# Patient Record
Sex: FEMALE | Race: WHITE | Employment: OTHER | URBAN - METROPOLITAN AREA
[De-identification: names, ages, dates, MRNs, and addresses within clinical notes are randomized per-mention and may not be internally consistent; named-entity substitution may affect disease eponyms.]

---

## 2017-01-09 ENCOUNTER — OFFICE VISIT (OUTPATIENT)
Dept: GERIATRIC MEDICINE | Age: 82
End: 2017-01-09

## 2017-01-09 DIAGNOSIS — D72.829 LEUKOCYTOSIS, UNSPECIFIED TYPE: ICD-10-CM

## 2017-01-09 DIAGNOSIS — E11.9 DIABETES MELLITUS, TYPE II, INSULIN DEPENDENT (HCC): ICD-10-CM

## 2017-01-09 DIAGNOSIS — G62.9 NEUROPATHY: ICD-10-CM

## 2017-01-09 DIAGNOSIS — Z79.4 DIABETES MELLITUS, TYPE II, INSULIN DEPENDENT (HCC): ICD-10-CM

## 2017-01-09 DIAGNOSIS — Z74.09 IMPAIRED MOBILITY: ICD-10-CM

## 2017-01-09 DIAGNOSIS — E03.9 HYPOTHYROIDISM, UNSPECIFIED TYPE: ICD-10-CM

## 2017-01-09 DIAGNOSIS — I10 ESSENTIAL HYPERTENSION: Primary | ICD-10-CM

## 2017-01-09 PROCEDURE — 99309 SBSQ NF CARE MODERATE MDM 30: CPT | Performed by: NURSE PRACTITIONER

## 2017-01-10 VITALS
TEMPERATURE: 97.3 F | HEART RATE: 57 BPM | RESPIRATION RATE: 16 BRPM | DIASTOLIC BLOOD PRESSURE: 57 MMHG | BODY MASS INDEX: 23.1 KG/M2 | WEIGHT: 147.5 LBS | SYSTOLIC BLOOD PRESSURE: 134 MMHG

## 2017-01-10 PROBLEM — Z74.09 IMPAIRED MOBILITY: Status: ACTIVE | Noted: 2017-01-10

## 2017-01-10 ASSESSMENT — ENCOUNTER SYMPTOMS
SHORTNESS OF BREATH: 0
ABDOMINAL PAIN: 0
ABDOMINAL DISTENTION: 0
NAUSEA: 0
CONSTIPATION: 0
DIARRHEA: 0
COUGH: 0
BLOOD IN STOOL: 0
VOMITING: 0

## 2017-01-23 ENCOUNTER — OFFICE VISIT (OUTPATIENT)
Dept: GERIATRIC MEDICINE | Age: 82
End: 2017-01-23

## 2017-01-23 DIAGNOSIS — D72.829 LEUKOCYTOSIS, UNSPECIFIED TYPE: ICD-10-CM

## 2017-01-23 DIAGNOSIS — R09.81 NASAL CONGESTION: ICD-10-CM

## 2017-01-23 DIAGNOSIS — R05.9 COUGH: Primary | ICD-10-CM

## 2017-01-23 PROCEDURE — 1123F ACP DISCUSS/DSCN MKR DOCD: CPT | Performed by: NURSE PRACTITIONER

## 2017-01-23 PROCEDURE — 99308 SBSQ NF CARE LOW MDM 20: CPT | Performed by: NURSE PRACTITIONER

## 2017-01-24 VITALS
SYSTOLIC BLOOD PRESSURE: 130 MMHG | TEMPERATURE: 98 F | RESPIRATION RATE: 20 BRPM | HEART RATE: 58 BPM | DIASTOLIC BLOOD PRESSURE: 57 MMHG | OXYGEN SATURATION: 95 %

## 2017-01-30 ASSESSMENT — ENCOUNTER SYMPTOMS
DIARRHEA: 0
WHEEZING: 0
VOICE CHANGE: 1
SHORTNESS OF BREATH: 0
ABDOMINAL PAIN: 0
SINUS PRESSURE: 0
SORE THROAT: 0
COUGH: 1
VOMITING: 0
ABDOMINAL DISTENTION: 0
RHINORRHEA: 0
NAUSEA: 0

## 2017-02-17 ENCOUNTER — NURSE ONLY (OUTPATIENT)
Dept: GERIATRIC MEDICINE | Age: 82
End: 2017-02-17

## 2017-02-17 DIAGNOSIS — L30.9 ECZEMA, UNSPECIFIED TYPE: Primary | ICD-10-CM

## 2017-02-17 PROCEDURE — 99308 SBSQ NF CARE LOW MDM 20: CPT | Performed by: NURSE PRACTITIONER

## 2017-02-20 VITALS
TEMPERATURE: 97.5 F | HEART RATE: 53 BPM | DIASTOLIC BLOOD PRESSURE: 65 MMHG | SYSTOLIC BLOOD PRESSURE: 131 MMHG | RESPIRATION RATE: 16 BRPM

## 2017-02-20 ASSESSMENT — ENCOUNTER SYMPTOMS
SHORTNESS OF BREATH: 0
COUGH: 0

## 2017-02-21 ENCOUNTER — OFFICE VISIT (OUTPATIENT)
Dept: GERIATRIC MEDICINE | Age: 82
End: 2017-02-21

## 2017-02-21 DIAGNOSIS — E11.8 CONTROLLED DIABETES MELLITUS TYPE 2 WITH COMPLICATIONS, UNSPECIFIED LONG TERM INSULIN USE STATUS: ICD-10-CM

## 2017-02-21 DIAGNOSIS — I10 ESSENTIAL HYPERTENSION: ICD-10-CM

## 2017-02-21 DIAGNOSIS — M79.606 LEG PAIN, ANTERIOR, UNSPECIFIED LATERALITY: Primary | ICD-10-CM

## 2017-02-21 PROCEDURE — 99308 SBSQ NF CARE LOW MDM 20: CPT | Performed by: INTERNAL MEDICINE

## 2017-02-21 PROCEDURE — 1123F ACP DISCUSS/DSCN MKR DOCD: CPT | Performed by: INTERNAL MEDICINE

## 2017-03-23 ENCOUNTER — NURSE ONLY (OUTPATIENT)
Dept: GERIATRIC MEDICINE | Age: 82
End: 2017-03-23

## 2017-03-23 DIAGNOSIS — E11.9 DIABETES MELLITUS, TYPE II, INSULIN DEPENDENT (HCC): Primary | ICD-10-CM

## 2017-03-23 DIAGNOSIS — I10 ESSENTIAL HYPERTENSION: ICD-10-CM

## 2017-03-23 DIAGNOSIS — N18.9 CKD (CHRONIC KIDNEY DISEASE), UNSPECIFIED STAGE: ICD-10-CM

## 2017-03-23 DIAGNOSIS — D72.829 LEUKOCYTOSIS, UNSPECIFIED TYPE: ICD-10-CM

## 2017-03-23 DIAGNOSIS — Z79.4 DIABETES MELLITUS, TYPE II, INSULIN DEPENDENT (HCC): Primary | ICD-10-CM

## 2017-03-23 DIAGNOSIS — Z74.09 IMPAIRED MOBILITY: ICD-10-CM

## 2017-03-23 PROCEDURE — 99309 SBSQ NF CARE MODERATE MDM 30: CPT | Performed by: NURSE PRACTITIONER

## 2017-03-27 ASSESSMENT — ENCOUNTER SYMPTOMS
VOMITING: 0
NAUSEA: 0
DIARRHEA: 0
SHORTNESS OF BREATH: 0
CONSTIPATION: 0
ABDOMINAL DISTENTION: 0
ABDOMINAL PAIN: 0
BLOOD IN STOOL: 0
COUGH: 0

## 2017-03-28 VITALS
HEART RATE: 56 BPM | RESPIRATION RATE: 14 BRPM | TEMPERATURE: 97.7 F | DIASTOLIC BLOOD PRESSURE: 95 MMHG | SYSTOLIC BLOOD PRESSURE: 136 MMHG

## 2017-03-30 ENCOUNTER — OFFICE VISIT (OUTPATIENT)
Dept: GERIATRIC MEDICINE | Age: 82
End: 2017-03-30

## 2017-03-30 DIAGNOSIS — D72.829 LEUKOCYTOSIS, UNSPECIFIED TYPE: Primary | ICD-10-CM

## 2017-03-30 PROCEDURE — 1123F ACP DISCUSS/DSCN MKR DOCD: CPT | Performed by: NURSE PRACTITIONER

## 2017-03-30 PROCEDURE — 99308 SBSQ NF CARE LOW MDM 20: CPT | Performed by: NURSE PRACTITIONER

## 2017-03-31 VITALS
SYSTOLIC BLOOD PRESSURE: 121 MMHG | RESPIRATION RATE: 20 BRPM | HEART RATE: 58 BPM | TEMPERATURE: 98 F | DIASTOLIC BLOOD PRESSURE: 45 MMHG | OXYGEN SATURATION: 95 %

## 2017-04-03 ASSESSMENT — ENCOUNTER SYMPTOMS
SINUS PRESSURE: 0
DIARRHEA: 0
ABDOMINAL PAIN: 0
SORE THROAT: 0
CONSTIPATION: 0
ABDOMINAL DISTENTION: 0
SHORTNESS OF BREATH: 0
VOMITING: 0
RHINORRHEA: 0
NAUSEA: 0
BLOOD IN STOOL: 0
COUGH: 0

## 2017-04-18 ENCOUNTER — NURSE ONLY (OUTPATIENT)
Dept: GERIATRIC MEDICINE | Age: 82
End: 2017-04-18

## 2017-04-18 DIAGNOSIS — G62.9 NEUROPATHY: ICD-10-CM

## 2017-04-18 DIAGNOSIS — I10 ESSENTIAL HYPERTENSION: Primary | ICD-10-CM

## 2017-04-18 DIAGNOSIS — M15.9 OSTEOARTHRITIS OF MULTIPLE JOINTS, UNSPECIFIED OSTEOARTHRITIS TYPE: ICD-10-CM

## 2017-04-18 PROCEDURE — 99308 SBSQ NF CARE LOW MDM 20: CPT | Performed by: INTERNAL MEDICINE

## 2017-05-11 ENCOUNTER — OFFICE VISIT (OUTPATIENT)
Dept: GERIATRIC MEDICINE | Age: 82
End: 2017-05-11

## 2017-05-11 DIAGNOSIS — Z79.4 DIABETES MELLITUS, TYPE II, INSULIN DEPENDENT (HCC): ICD-10-CM

## 2017-05-11 DIAGNOSIS — N18.9 CKD (CHRONIC KIDNEY DISEASE), UNSPECIFIED STAGE: ICD-10-CM

## 2017-05-11 DIAGNOSIS — I10 ESSENTIAL HYPERTENSION: Primary | ICD-10-CM

## 2017-05-11 DIAGNOSIS — E11.9 DIABETES MELLITUS, TYPE II, INSULIN DEPENDENT (HCC): ICD-10-CM

## 2017-05-11 DIAGNOSIS — Z78.9 IMPAIRED MOBILITY AND ADLS: ICD-10-CM

## 2017-05-11 DIAGNOSIS — E03.9 HYPOTHYROIDISM, UNSPECIFIED TYPE: ICD-10-CM

## 2017-05-11 DIAGNOSIS — D72.829 LEUKOCYTOSIS, UNSPECIFIED TYPE: ICD-10-CM

## 2017-05-11 DIAGNOSIS — Z74.09 IMPAIRED MOBILITY AND ADLS: ICD-10-CM

## 2017-05-11 PROCEDURE — 99309 SBSQ NF CARE MODERATE MDM 30: CPT | Performed by: NURSE PRACTITIONER

## 2017-05-11 PROCEDURE — 1123F ACP DISCUSS/DSCN MKR DOCD: CPT | Performed by: NURSE PRACTITIONER

## 2017-05-17 VITALS
SYSTOLIC BLOOD PRESSURE: 132 MMHG | BODY MASS INDEX: 23.65 KG/M2 | WEIGHT: 151 LBS | DIASTOLIC BLOOD PRESSURE: 68 MMHG | RESPIRATION RATE: 18 BRPM | HEART RATE: 70 BPM

## 2017-05-17 PROBLEM — D72.829 LEUKOCYTOSIS: Status: ACTIVE | Noted: 2017-05-17

## 2017-05-17 ASSESSMENT — ENCOUNTER SYMPTOMS
CONSTIPATION: 0
NAUSEA: 0
BLOOD IN STOOL: 0
COUGH: 0
VOMITING: 0
ABDOMINAL DISTENTION: 0
DIARRHEA: 0
ABDOMINAL PAIN: 0
SHORTNESS OF BREATH: 0

## 2017-06-06 ENCOUNTER — OFFICE VISIT (OUTPATIENT)
Dept: GERIATRIC MEDICINE | Age: 82
End: 2017-06-06

## 2017-06-06 DIAGNOSIS — E11.9 DIABETES MELLITUS, TYPE II, INSULIN DEPENDENT (HCC): Primary | ICD-10-CM

## 2017-06-06 DIAGNOSIS — N18.9 CKD (CHRONIC KIDNEY DISEASE), UNSPECIFIED STAGE: ICD-10-CM

## 2017-06-06 DIAGNOSIS — Z79.4 DIABETES MELLITUS, TYPE II, INSULIN DEPENDENT (HCC): Primary | ICD-10-CM

## 2017-06-06 PROCEDURE — 99308 SBSQ NF CARE LOW MDM 20: CPT | Performed by: NURSE PRACTITIONER

## 2017-06-06 PROCEDURE — 1123F ACP DISCUSS/DSCN MKR DOCD: CPT | Performed by: NURSE PRACTITIONER

## 2017-06-07 VITALS
WEIGHT: 150 LBS | HEART RATE: 57 BPM | TEMPERATURE: 97.8 F | RESPIRATION RATE: 16 BRPM | OXYGEN SATURATION: 94 % | SYSTOLIC BLOOD PRESSURE: 162 MMHG | DIASTOLIC BLOOD PRESSURE: 80 MMHG | BODY MASS INDEX: 23.49 KG/M2

## 2017-06-07 RX ORDER — INSULIN GLARGINE 100 [IU]/ML
50 INJECTION, SOLUTION SUBCUTANEOUS DAILY
Qty: 1 VIAL | Refills: 0
Start: 2017-06-07 | End: 2017-06-15 | Stop reason: SDUPTHER

## 2017-06-08 ENCOUNTER — OFFICE VISIT (OUTPATIENT)
Dept: GERIATRIC MEDICINE | Age: 82
End: 2017-06-08

## 2017-06-08 VITALS
HEART RATE: 57 BPM | TEMPERATURE: 97.8 F | SYSTOLIC BLOOD PRESSURE: 162 MMHG | RESPIRATION RATE: 16 BRPM | DIASTOLIC BLOOD PRESSURE: 80 MMHG

## 2017-06-08 PROCEDURE — 1123F ACP DISCUSS/DSCN MKR DOCD: CPT | Performed by: NURSE PRACTITIONER

## 2017-06-08 PROCEDURE — 99308 SBSQ NF CARE LOW MDM 20: CPT | Performed by: NURSE PRACTITIONER

## 2017-06-15 ENCOUNTER — OFFICE VISIT (OUTPATIENT)
Dept: GERIATRIC MEDICINE | Age: 82
End: 2017-06-15

## 2017-06-15 VITALS — SYSTOLIC BLOOD PRESSURE: 140 MMHG | HEART RATE: 60 BPM | DIASTOLIC BLOOD PRESSURE: 89 MMHG | RESPIRATION RATE: 18 BRPM

## 2017-06-15 DIAGNOSIS — Z79.4 DIABETES MELLITUS, TYPE II, INSULIN DEPENDENT (HCC): Primary | ICD-10-CM

## 2017-06-15 DIAGNOSIS — E11.9 DIABETES MELLITUS, TYPE II, INSULIN DEPENDENT (HCC): Primary | ICD-10-CM

## 2017-06-15 PROCEDURE — 1036F TOBACCO NON-USER: CPT | Performed by: NURSE PRACTITIONER

## 2017-06-15 PROCEDURE — G8599 NO ASA/ANTIPLAT THER USE RNG: HCPCS | Performed by: NURSE PRACTITIONER

## 2017-06-15 PROCEDURE — 1090F PRES/ABSN URINE INCON ASSESS: CPT | Performed by: NURSE PRACTITIONER

## 2017-06-15 PROCEDURE — 4040F PNEUMOC VAC/ADMIN/RCVD: CPT | Performed by: NURSE PRACTITIONER

## 2017-06-15 PROCEDURE — G8427 DOCREV CUR MEDS BY ELIG CLIN: HCPCS | Performed by: NURSE PRACTITIONER

## 2017-06-15 PROCEDURE — G8420 CALC BMI NORM PARAMETERS: HCPCS | Performed by: NURSE PRACTITIONER

## 2017-06-15 PROCEDURE — 1123F ACP DISCUSS/DSCN MKR DOCD: CPT | Performed by: NURSE PRACTITIONER

## 2017-06-15 PROCEDURE — 99307 SBSQ NF CARE SF MDM 10: CPT | Performed by: NURSE PRACTITIONER

## 2017-06-15 RX ORDER — INSULIN GLARGINE 100 [IU]/ML
45 INJECTION, SOLUTION SUBCUTANEOUS NIGHTLY
Qty: 1 VIAL | Refills: 0
Start: 2017-06-15 | End: 2017-07-16 | Stop reason: SDUPTHER

## 2017-06-20 ENCOUNTER — OFFICE VISIT (OUTPATIENT)
Dept: GERIATRIC MEDICINE | Age: 82
End: 2017-06-20

## 2017-06-20 DIAGNOSIS — Z79.4 DIABETES MELLITUS, TYPE II, INSULIN DEPENDENT (HCC): Primary | ICD-10-CM

## 2017-06-20 DIAGNOSIS — E11.9 DIABETES MELLITUS, TYPE II, INSULIN DEPENDENT (HCC): Primary | ICD-10-CM

## 2017-06-20 DIAGNOSIS — L57.0 ACTINIC KERATOSES: ICD-10-CM

## 2017-06-20 PROCEDURE — 99308 SBSQ NF CARE LOW MDM 20: CPT | Performed by: NURSE PRACTITIONER

## 2017-06-20 PROCEDURE — 1123F ACP DISCUSS/DSCN MKR DOCD: CPT | Performed by: NURSE PRACTITIONER

## 2017-06-21 VITALS — DIASTOLIC BLOOD PRESSURE: 80 MMHG | TEMPERATURE: 97.8 F | SYSTOLIC BLOOD PRESSURE: 162 MMHG | HEART RATE: 57 BPM

## 2017-06-21 RX ORDER — FLUOROURACIL 5 MG/G
CREAM TOPICAL
Qty: 1 TUBE | Refills: 0
Start: 2017-06-21 | End: 2017-07-18 | Stop reason: SDUPTHER

## 2017-07-13 ENCOUNTER — OFFICE VISIT (OUTPATIENT)
Dept: GERIATRIC MEDICINE | Age: 82
End: 2017-07-13

## 2017-07-13 DIAGNOSIS — E11.9 DIABETES MELLITUS, TYPE II, INSULIN DEPENDENT (HCC): ICD-10-CM

## 2017-07-13 DIAGNOSIS — Z79.4 DIABETES MELLITUS, TYPE II, INSULIN DEPENDENT (HCC): ICD-10-CM

## 2017-07-13 LAB — HBA1C MFR BLD: 6.8 %

## 2017-07-13 PROCEDURE — 99308 SBSQ NF CARE LOW MDM 20: CPT | Performed by: NURSE PRACTITIONER

## 2017-07-13 PROCEDURE — 1123F ACP DISCUSS/DSCN MKR DOCD: CPT | Performed by: NURSE PRACTITIONER

## 2017-07-16 VITALS
SYSTOLIC BLOOD PRESSURE: 162 MMHG | OXYGEN SATURATION: 94 % | TEMPERATURE: 97.8 F | DIASTOLIC BLOOD PRESSURE: 80 MMHG | WEIGHT: 150 LBS | BODY MASS INDEX: 23.49 KG/M2 | HEART RATE: 57 BPM | RESPIRATION RATE: 16 BRPM

## 2017-07-16 RX ORDER — INSULIN GLARGINE 100 [IU]/ML
35 INJECTION, SOLUTION SUBCUTANEOUS NIGHTLY
Qty: 1 VIAL | Refills: 0
Start: 2017-07-16 | End: 2017-07-18 | Stop reason: SDUPTHER

## 2017-07-18 ENCOUNTER — OFFICE VISIT (OUTPATIENT)
Dept: GERIATRIC MEDICINE | Age: 82
End: 2017-07-18

## 2017-07-18 DIAGNOSIS — Z79.4 DIABETES MELLITUS, TYPE II, INSULIN DEPENDENT (HCC): Primary | ICD-10-CM

## 2017-07-18 DIAGNOSIS — E78.2 MIXED HYPERLIPIDEMIA: ICD-10-CM

## 2017-07-18 DIAGNOSIS — E11.9 DIABETES MELLITUS, TYPE II, INSULIN DEPENDENT (HCC): Primary | ICD-10-CM

## 2017-07-18 DIAGNOSIS — E03.4 HYPOTHYROIDISM DUE TO ACQUIRED ATROPHY OF THYROID: ICD-10-CM

## 2017-07-18 DIAGNOSIS — I10 ESSENTIAL HYPERTENSION: ICD-10-CM

## 2017-07-18 PROCEDURE — 99308 SBSQ NF CARE LOW MDM 20: CPT | Performed by: NURSE PRACTITIONER

## 2017-07-18 PROCEDURE — 1123F ACP DISCUSS/DSCN MKR DOCD: CPT | Performed by: NURSE PRACTITIONER

## 2017-07-18 RX ORDER — PREGABALIN 25 MG/1
25 CAPSULE ORAL 2 TIMES DAILY
COMMUNITY
End: 2017-09-17 | Stop reason: ALTCHOICE

## 2017-07-18 RX ORDER — ACETAMINOPHEN 325 MG/1
650 TABLET ORAL EVERY 4 HOURS PRN
Status: ON HOLD | COMMUNITY
End: 2019-10-22 | Stop reason: HOSPADM

## 2017-07-18 RX ORDER — LISINOPRIL 40 MG/1
40 TABLET ORAL DAILY
COMMUNITY

## 2017-07-18 RX ORDER — OXYCODONE HYDROCHLORIDE AND ACETAMINOPHEN 5; 325 MG/1; MG/1
1-2 TABLET ORAL EVERY 6 HOURS PRN
COMMUNITY
End: 2018-01-08

## 2017-07-18 RX ORDER — OMEPRAZOLE 20 MG/1
20 CAPSULE, DELAYED RELEASE ORAL DAILY
COMMUNITY

## 2017-07-18 RX ORDER — ONDANSETRON 4 MG/1
4 TABLET, FILM COATED ORAL EVERY 8 HOURS PRN
COMMUNITY

## 2017-07-18 RX ORDER — ACETAMINOPHEN 650 MG/1
650 SUPPOSITORY RECTAL EVERY 4 HOURS PRN
Status: ON HOLD | COMMUNITY
End: 2019-10-22 | Stop reason: HOSPADM

## 2017-07-18 RX ORDER — INSULIN GLARGINE 100 [IU]/ML
50 INJECTION, SOLUTION SUBCUTANEOUS NIGHTLY
COMMUNITY
End: 2017-07-20

## 2017-07-18 RX ORDER — LETROZOLE 2.5 MG/1
2.5 TABLET, FILM COATED ORAL DAILY
COMMUNITY

## 2017-07-18 RX ORDER — LEVOTHYROXINE SODIUM 0.12 MG/1
125 TABLET ORAL DAILY
COMMUNITY

## 2017-07-18 RX ORDER — ACETAMINOPHEN 500 MG
500 TABLET ORAL 2 TIMES DAILY
COMMUNITY

## 2017-07-18 RX ORDER — ATORVASTATIN CALCIUM 40 MG/1
40 TABLET, FILM COATED ORAL DAILY
COMMUNITY

## 2017-07-18 RX ORDER — LOPERAMIDE HYDROCHLORIDE 2 MG/1
2 CAPSULE ORAL EVERY 6 HOURS PRN
COMMUNITY

## 2017-07-18 RX ORDER — AMLODIPINE BESYLATE 5 MG/1
5 TABLET ORAL DAILY
COMMUNITY

## 2017-07-18 RX ORDER — MELATONIN
2000 DAILY
COMMUNITY

## 2017-07-18 RX ORDER — POLYVINYL ALCOHOL 14 MG/ML
2 SOLUTION/ DROPS OPHTHALMIC 4 TIMES DAILY PRN
COMMUNITY

## 2017-07-18 RX ORDER — CLINDAMYCIN HYDROCHLORIDE 150 MG/1
600 CAPSULE ORAL PRN
Status: ON HOLD | COMMUNITY
End: 2019-10-22 | Stop reason: HOSPADM

## 2017-07-20 ENCOUNTER — OFFICE VISIT (OUTPATIENT)
Dept: GERIATRIC MEDICINE | Age: 82
End: 2017-07-20

## 2017-07-20 VITALS
WEIGHT: 150 LBS | RESPIRATION RATE: 16 BRPM | DIASTOLIC BLOOD PRESSURE: 80 MMHG | TEMPERATURE: 97.8 F | SYSTOLIC BLOOD PRESSURE: 162 MMHG | HEART RATE: 57 BPM | BODY MASS INDEX: 23.49 KG/M2

## 2017-07-20 DIAGNOSIS — E11.9 DIABETES MELLITUS, TYPE II, INSULIN DEPENDENT (HCC): Primary | ICD-10-CM

## 2017-07-20 DIAGNOSIS — E03.4 HYPOTHYROIDISM DUE TO ACQUIRED ATROPHY OF THYROID: ICD-10-CM

## 2017-07-20 DIAGNOSIS — Z79.4 DIABETES MELLITUS, TYPE II, INSULIN DEPENDENT (HCC): Primary | ICD-10-CM

## 2017-07-20 DIAGNOSIS — I10 ESSENTIAL HYPERTENSION: ICD-10-CM

## 2017-07-20 DIAGNOSIS — Z74.09 IMPAIRED MOBILITY: ICD-10-CM

## 2017-07-20 PROCEDURE — 99308 SBSQ NF CARE LOW MDM 20: CPT | Performed by: NURSE PRACTITIONER

## 2017-07-20 PROCEDURE — 1123F ACP DISCUSS/DSCN MKR DOCD: CPT | Performed by: NURSE PRACTITIONER

## 2017-07-23 VITALS
RESPIRATION RATE: 16 BRPM | DIASTOLIC BLOOD PRESSURE: 80 MMHG | HEART RATE: 57 BPM | TEMPERATURE: 97.8 F | SYSTOLIC BLOOD PRESSURE: 162 MMHG

## 2017-08-30 ENCOUNTER — OFFICE VISIT (OUTPATIENT)
Dept: GERIATRIC MEDICINE | Age: 82
End: 2017-08-30

## 2017-08-30 DIAGNOSIS — Z79.4 TYPE 2 DIABETES MELLITUS WITHOUT COMPLICATION, WITH LONG-TERM CURRENT USE OF INSULIN (HCC): Primary | ICD-10-CM

## 2017-08-30 DIAGNOSIS — M15.9 OSTEOARTHRITIS OF MULTIPLE JOINTS, UNSPECIFIED OSTEOARTHRITIS TYPE: ICD-10-CM

## 2017-08-30 DIAGNOSIS — E11.9 TYPE 2 DIABETES MELLITUS WITHOUT COMPLICATION, WITH LONG-TERM CURRENT USE OF INSULIN (HCC): Primary | ICD-10-CM

## 2017-08-30 DIAGNOSIS — I10 ESSENTIAL HYPERTENSION: ICD-10-CM

## 2017-08-30 PROCEDURE — 99308 SBSQ NF CARE LOW MDM 20: CPT | Performed by: INTERNAL MEDICINE

## 2017-08-30 PROCEDURE — 1123F ACP DISCUSS/DSCN MKR DOCD: CPT | Performed by: INTERNAL MEDICINE

## 2017-09-05 ENCOUNTER — OFFICE VISIT (OUTPATIENT)
Dept: GERIATRIC MEDICINE | Age: 82
End: 2017-09-05

## 2017-09-05 DIAGNOSIS — G54.6 PHANTOM LIMB PAIN (HCC): Primary | ICD-10-CM

## 2017-09-05 DIAGNOSIS — G62.9 NEUROPATHY: ICD-10-CM

## 2017-09-05 PROCEDURE — 99307 SBSQ NF CARE SF MDM 10: CPT | Performed by: NURSE PRACTITIONER

## 2017-09-05 PROCEDURE — 1123F ACP DISCUSS/DSCN MKR DOCD: CPT | Performed by: NURSE PRACTITIONER

## 2017-09-12 ENCOUNTER — OFFICE VISIT (OUTPATIENT)
Dept: GERIATRIC MEDICINE | Age: 82
End: 2017-09-12

## 2017-09-12 DIAGNOSIS — G62.9 NEUROPATHY: Primary | ICD-10-CM

## 2017-09-12 PROCEDURE — 1123F ACP DISCUSS/DSCN MKR DOCD: CPT | Performed by: NURSE PRACTITIONER

## 2017-09-12 PROCEDURE — 99308 SBSQ NF CARE LOW MDM 20: CPT | Performed by: NURSE PRACTITIONER

## 2017-09-13 VITALS
WEIGHT: 150 LBS | TEMPERATURE: 97.8 F | HEART RATE: 57 BPM | OXYGEN SATURATION: 94 % | BODY MASS INDEX: 23.49 KG/M2 | RESPIRATION RATE: 16 BRPM | DIASTOLIC BLOOD PRESSURE: 80 MMHG | SYSTOLIC BLOOD PRESSURE: 162 MMHG

## 2017-09-17 VITALS — RESPIRATION RATE: 18 BRPM | TEMPERATURE: 97.8 F | HEART RATE: 68 BPM

## 2017-09-17 RX ORDER — GABAPENTIN 100 MG/1
100 CAPSULE ORAL DAILY
Qty: 90 CAPSULE | Refills: 3
Start: 2017-09-17

## 2017-09-21 ENCOUNTER — OFFICE VISIT (OUTPATIENT)
Dept: GERIATRIC MEDICINE | Age: 82
End: 2017-09-21

## 2017-09-21 DIAGNOSIS — E03.9 HYPOTHYROIDISM, UNSPECIFIED TYPE: ICD-10-CM

## 2017-09-21 DIAGNOSIS — E11.9 DIABETES MELLITUS, TYPE II, INSULIN DEPENDENT (HCC): Primary | ICD-10-CM

## 2017-09-21 DIAGNOSIS — G54.7 PHANTOM LIMB SYNDROME (HCC): ICD-10-CM

## 2017-09-21 DIAGNOSIS — Z79.4 DIABETES MELLITUS, TYPE II, INSULIN DEPENDENT (HCC): Primary | ICD-10-CM

## 2017-09-21 DIAGNOSIS — Z74.09 IMPAIRED MOBILITY: ICD-10-CM

## 2017-09-21 PROCEDURE — 99308 SBSQ NF CARE LOW MDM 20: CPT | Performed by: NURSE PRACTITIONER

## 2017-09-21 PROCEDURE — 1123F ACP DISCUSS/DSCN MKR DOCD: CPT | Performed by: NURSE PRACTITIONER

## 2017-09-22 VITALS
HEART RATE: 57 BPM | BODY MASS INDEX: 23.49 KG/M2 | RESPIRATION RATE: 16 BRPM | TEMPERATURE: 97.8 F | SYSTOLIC BLOOD PRESSURE: 162 MMHG | DIASTOLIC BLOOD PRESSURE: 80 MMHG | WEIGHT: 150 LBS | OXYGEN SATURATION: 94 %

## 2017-09-23 PROBLEM — G54.7 PHANTOM LIMB SYNDROME (HCC): Status: ACTIVE | Noted: 2017-09-23

## 2017-09-28 ENCOUNTER — OFFICE VISIT (OUTPATIENT)
Dept: GERIATRIC MEDICINE | Age: 82
End: 2017-09-28

## 2017-09-28 VITALS
RESPIRATION RATE: 16 BRPM | SYSTOLIC BLOOD PRESSURE: 162 MMHG | OXYGEN SATURATION: 94 % | WEIGHT: 150 LBS | TEMPERATURE: 97.8 F | DIASTOLIC BLOOD PRESSURE: 80 MMHG | BODY MASS INDEX: 23.49 KG/M2 | HEART RATE: 57 BPM

## 2017-09-28 DIAGNOSIS — G54.7 PHANTOM LIMB SYNDROME (HCC): Primary | ICD-10-CM

## 2017-09-28 PROCEDURE — 99307 SBSQ NF CARE SF MDM 10: CPT | Performed by: NURSE PRACTITIONER

## 2017-09-28 PROCEDURE — 1123F ACP DISCUSS/DSCN MKR DOCD: CPT | Performed by: NURSE PRACTITIONER

## 2017-10-01 NOTE — PROGRESS NOTES
[address] [place] [address]      Chester Peña  is a 80 y.o. in the NF being seen for a f/u of   Chief Complaint   Patient presents with    1 Month Follow-Up     DM2 stage 3 nephropathy w/ insulin, hypothyroid, phantom limb syndrome, and impaired mobility        HPI lives in LTC  Due to immobility double leg amputee for blood clots  Having phantom limb pain we have been trying to stop Lyrica and start Neurontin since she feels Lyrica is not working  She is very anti medication and does not like to take medications  No other issues eats well is social no depression, Dm is under good control no further hyperglycemic episodes    Past Medical History:   Diagnosis Date    CAD (coronary artery disease)     Combined hyperlipidemia     DM2 [facility]diabetes mellitus, type 2) [facility]     DVT [facility]deep venous thrombosis) [facility]     Hypertension     Hypothyroidism     [facility] 9/23/2017    PVD (peripheral vascular disease) ([facility]      Past Surgical History:   Procedure Laterality Date    ABOVE KNEE AMPUTATION      left    APPENDECTOMY      CHOLECYSTECTOMY      CORONARY ANGIOPLASTY WITH STENT PLACEMENT      LEG AMPUTATION BELOW KNEE      right     No family history on file. Social History     Social History    Marital status:      Spouse name: N/A    Number of children: N/A    Years of education: N/A     Occupational History    Not on file. Social History Main Topics    Smoking status: Unknown If Ever Smoked    Smokeless tobacco: Never Used    Alcohol use No    Drug use: Not on file    Sexual activity: Not on file     Other Topics Concern    Not on file     Social History Narrative       Allergies: Pcn [penicillins]  NF MEDICATIONS REVIEWED    ROS:   Constitutional: There are no reports of behavioral issues, change in appetite, fever, or weakness. No gross bleeding issues.   Respiratory: denies SOB, dyspnea, dyspnea on exertion,   Cardiovascular: denies CP, lightheadedness, palpitations, PND,n.  GI: No N/V/D. : no reports of dysuria  Extremities: same reports of pain issues, no stump edema    Physical exam:   BP (!) 162/80  Pulse 57  Temp 97.8 °F (36.6 °C)  Resp 16  Wt 150 lb (68 kg)  SpO2 94%  BMI 23.49 kg/m2  Constitutional: Awake and alert sitting up in wheel chair  Cardiovascular: Regular rate  -c/r  Respiratory: LCTA  GI: abdomen +BS NT ND  : no suprapubic tenderness  Extremities: no edema,     ASSESSMENT:    1. Diabetes mellitus, type II, insulin dependent ([facility]     2. Hypothyroidism, unspecified type     3. Phantom limb syndrome ([facility]     4. Impaired mobility         PLAN:   1. Stable good A1c  2. TSH good  3. trying Neurontin, stop Lyrica   4. Electric wh ch      Return in about 1 month (around 10/21/2017). Pertinent POC, labs, have been reviewed, continue same. Encourage fluids and good nutrition. Stress fall prevention strategies.     Percell Soulier, ALMA, MSN, RN, GNP-BC, NP-C  Adult Nurse Practitioner  [address], [facility]  Department of Geriatrics  8:30am-4:30pm   [phone]  After hours Answering service [phone]

## 2017-10-03 RX ORDER — GABAPENTIN 100 MG/1
200 CAPSULE ORAL NIGHTLY
Qty: 180 CAPSULE | Refills: 3
Start: 2017-10-03

## 2017-10-09 ENCOUNTER — OFFICE VISIT (OUTPATIENT)
Dept: GERIATRIC MEDICINE | Age: 82
End: 2017-10-09

## 2017-10-09 DIAGNOSIS — R73.9 HYPERGLYCEMIA: Primary | ICD-10-CM

## 2017-10-09 DIAGNOSIS — E11.9 DIABETES MELLITUS, TYPE II, INSULIN DEPENDENT (HCC): ICD-10-CM

## 2017-10-09 DIAGNOSIS — Z79.4 DIABETES MELLITUS, TYPE II, INSULIN DEPENDENT (HCC): ICD-10-CM

## 2017-10-09 PROCEDURE — 1123F ACP DISCUSS/DSCN MKR DOCD: CPT | Performed by: NURSE PRACTITIONER

## 2017-10-09 PROCEDURE — 99307 SBSQ NF CARE SF MDM 10: CPT | Performed by: NURSE PRACTITIONER

## 2017-10-10 VITALS
DIASTOLIC BLOOD PRESSURE: 80 MMHG | TEMPERATURE: 97.8 F | HEART RATE: 57 BPM | WEIGHT: 150 LBS | OXYGEN SATURATION: 94 % | SYSTOLIC BLOOD PRESSURE: 162 MMHG | BODY MASS INDEX: 23.49 KG/M2 | RESPIRATION RATE: 16 BRPM

## 2017-10-23 ENCOUNTER — OFFICE VISIT (OUTPATIENT)
Dept: GERIATRIC MEDICINE | Age: 82
End: 2017-10-23

## 2017-10-23 DIAGNOSIS — I73.9 PVD (PERIPHERAL VASCULAR DISEASE) (HCC): Primary | ICD-10-CM

## 2017-10-23 DIAGNOSIS — I10 ESSENTIAL HYPERTENSION: ICD-10-CM

## 2017-10-23 PROCEDURE — 1123F ACP DISCUSS/DSCN MKR DOCD: CPT | Performed by: INTERNAL MEDICINE

## 2017-10-23 PROCEDURE — 99308 SBSQ NF CARE LOW MDM 20: CPT | Performed by: INTERNAL MEDICINE

## 2017-10-24 NOTE — PROGRESS NOTES
PATIENT: Emilia Corona : 1926 DOS: 10/23/2017     [facility]    Seen for a routine monthly visit for peripheral vascular disease, diabetes, hypertension. MEDICATIONS:  Reviewed. SUBJECTIVE:  This 49-year-old woman was evaluated in her room. The patient has been clinically stable, pleasant and interactive. The patient has not had any new significant phantom pain. She remains mobile in her wheelchair. The patient is cognitively intact. She has elements of depression at times, but globally is clinically stable. No evidence of acute psychosis. No new falls. Blood sugars are reviewed with nursing staff. OBJECTIVE:  She appeared stable. Pupils are equal and reactive. Oral mucosa is moist.  No thrush. Tongue was midline. No dysarthria. Neck was supple. Chest showed no crackles, no wheezing. Cardiovascular exam showed a regular rate. Abdomen was soft, nontender. Extremities showed +1 dorsal pedal pulse. ASSESSMENT AND PLAN:  1. Peripheral vascular disease. The patient is clinically stable at this time. No asymptomatic hypoglycemia. The patient's prosthetics have been in use at times, clinically stable. 2.   Hypertension. Blood pressure is stable. We will monitor closely.         Electronically Signed By: Graciela Ivey M.D. on 10/24/2017 01:29:08  ______________________________  Graciela Ivey M.D.  [id number]  D: 10/23/2017 18:46:59  T: 10/23/2017 22:41:55    cc: - [facility]

## 2017-11-09 ENCOUNTER — OFFICE VISIT (OUTPATIENT)
Dept: GERIATRIC MEDICINE | Age: 82
End: 2017-11-09

## 2017-11-09 DIAGNOSIS — E11.9 DIABETES MELLITUS, TYPE II, INSULIN DEPENDENT (HCC): Primary | ICD-10-CM

## 2017-11-09 DIAGNOSIS — I10 ESSENTIAL HYPERTENSION: ICD-10-CM

## 2017-11-09 DIAGNOSIS — G54.7 PHANTOM LIMB SYNDROME (HCC): ICD-10-CM

## 2017-11-09 DIAGNOSIS — I25.10 CORONARY ARTERY DISEASE INVOLVING NATIVE CORONARY ARTERY OF NATIVE HEART WITHOUT ANGINA PECTORIS: ICD-10-CM

## 2017-11-09 DIAGNOSIS — Z79.4 DIABETES MELLITUS, TYPE II, INSULIN DEPENDENT (HCC): Primary | ICD-10-CM

## 2017-11-09 PROCEDURE — 99307 SBSQ NF CARE SF MDM 10: CPT | Performed by: NURSE PRACTITIONER

## 2017-11-09 PROCEDURE — 1123F ACP DISCUSS/DSCN MKR DOCD: CPT | Performed by: NURSE PRACTITIONER

## 2017-11-13 VITALS
HEART RATE: 57 BPM | RESPIRATION RATE: 16 BRPM | OXYGEN SATURATION: 94 % | DIASTOLIC BLOOD PRESSURE: 50 MMHG | TEMPERATURE: 97.5 F | WEIGHT: 146 LBS | SYSTOLIC BLOOD PRESSURE: 138 MMHG | BODY MASS INDEX: 22.87 KG/M2

## 2017-11-24 NOTE — PROGRESS NOTES
[address] [place] [address]      Gilles Griffin  is a 80 y.o. in the NF being seen for a f/u of   Chief Complaint   Patient presents with    1 Month Follow-Up     M for chronic illness of DM2, CAD, phantom limb pain, HTN       HPI lives in LTC  B/l amputee uses electric wh ch for locomotion  Has good BGs  Phantom pain better on neurontin rather than Lyrica   Likes to eat with others and comes out of her room to socialize. Likes to talon. Past Medical History:   Diagnosis Date    CAD (coronary artery disease)     Combined hyperlipidemia     DM2 [facility]diabetes mellitus, type 2) [facility]     [facility]     Hypertension     Hypothyroidism     Phantom limb syndrome ([facility] 9/23/2017    PVD (peripheral vascular disease) ([facility]      Past Surgical History:   Procedure Laterality Date    ABOVE KNEE AMPUTATION      left    APPENDECTOMY      CHOLECYSTECTOMY      CORONARY ANGIOPLASTY WITH STENT PLACEMENT      LEG AMPUTATION BELOW KNEE      right     No family history on file. Social History     Social History    Marital status:      Spouse name: N/A    Number of children: N/A    Years of education: N/A     Occupational History    Not on file. Social History Main Topics    Smoking status: Unknown If Ever Smoked    Smokeless tobacco: Never Used    Alcohol use No    Drug use: Unknown    Sexual activity: Not on file     Other Topics Concern    Not on file     Social History Narrative    No narrative on file       Allergies: Pcn [penicillins]  NF MEDICATIONS REVIEWED    ROS:   Constitutional: There are no  behavioral issues, change in appetite, fever, or weakness. No gross bleeding issues. Respiratory: denies SOB, dyspnea, dyspnea on exertion, change in exercise capacity  Cardiovascular: denies CP, lightheadedness, palpitations, PND, orthopnea, or claudication. GI: No N/V/D.    : no reports of dysuria  Extremities: No reports of worse pain issues, no edema    Physical exam:   BP (!) 138/50   Pulse 57   Temp 97.5 °F (36.4 °C)   Resp 16   Wt 146 lb (66.2 kg)   SpO2 94%   BMI 22.87 kg/m²   Constitutional: Awake and alert sitting up in wheel chair, pleasant and conversant  Cardiovascular: Regular rate CHRISTIAN,  -c/r  Respiratory: LCTA  GI: abdomen +BS NT ND  : no suprapubic tenderness  Extremities: no edema    ASSESSMENT:    1. Diabetes mellitus, type II, insulin dependent ([facility]     2. Coronary artery disease involving native coronary artery of native heart without angina pectoris     3. Phantom limb syndrome ([facility]     4. Essential hypertension         PLAN:   1. Stable  2. No angina  3. Under control  4. In goal  Return in about 1 month (around 12/9/2017). Pertinent POC, labs, have been reviewed, continue same. Encourage fluids and good nutrition. Stress fall prevention strategies.     Mickey Will DNP, MSN, RN, GNP-BC, NP-C  Adult Nurse Practitioner  [address]  Department of Geriatrics  8:30am-4:30pm   [phone]  After hours Answering service [phone]

## 2017-12-14 ENCOUNTER — OFFICE VISIT (OUTPATIENT)
Dept: GERIATRIC MEDICINE | Age: 82
End: 2017-12-14

## 2017-12-14 DIAGNOSIS — K59.01 SLOW TRANSIT CONSTIPATION: Primary | ICD-10-CM

## 2017-12-14 PROCEDURE — 1123F ACP DISCUSS/DSCN MKR DOCD: CPT | Performed by: NURSE PRACTITIONER

## 2017-12-14 PROCEDURE — 99307 SBSQ NF CARE SF MDM 10: CPT | Performed by: NURSE PRACTITIONER

## 2017-12-15 VITALS
HEART RATE: 57 BPM | DIASTOLIC BLOOD PRESSURE: 50 MMHG | WEIGHT: 146 LBS | TEMPERATURE: 97.5 F | SYSTOLIC BLOOD PRESSURE: 138 MMHG | OXYGEN SATURATION: 94 % | RESPIRATION RATE: 16 BRPM | BODY MASS INDEX: 22.87 KG/M2

## 2018-01-08 ENCOUNTER — OFFICE VISIT (OUTPATIENT)
Dept: GERIATRIC MEDICINE | Age: 83
End: 2018-01-08

## 2018-01-08 DIAGNOSIS — E11.42 TYPE 2 DM WITH DIABETIC NEUROPATHY AFFECTING BOTH SIDES OF BODY (HCC): ICD-10-CM

## 2018-01-08 DIAGNOSIS — I10 ESSENTIAL HYPERTENSION: ICD-10-CM

## 2018-01-08 DIAGNOSIS — I73.9 PAD (PERIPHERAL ARTERY DISEASE) (HCC): Primary | ICD-10-CM

## 2018-01-08 DIAGNOSIS — G54.7 PHANTOM LIMB SYNDROME (HCC): ICD-10-CM

## 2018-01-08 PROCEDURE — 99308 SBSQ NF CARE LOW MDM 20: CPT | Performed by: NURSE PRACTITIONER

## 2018-01-08 PROCEDURE — 1123F ACP DISCUSS/DSCN MKR DOCD: CPT | Performed by: NURSE PRACTITIONER

## 2018-01-09 VITALS
RESPIRATION RATE: 16 BRPM | DIASTOLIC BLOOD PRESSURE: 90 MMHG | WEIGHT: 142 LBS | BODY MASS INDEX: 32.86 KG/M2 | OXYGEN SATURATION: 97 % | TEMPERATURE: 97.4 F | HEIGHT: 55 IN | HEART RATE: 60 BPM | SYSTOLIC BLOOD PRESSURE: 177 MMHG

## 2018-01-13 NOTE — PROGRESS NOTES
[address] [place] [address]      Clint Becerra  is a 80 y.o. in the NF being seen for a f/u of   Chief Complaint   Patient presents with    1 Month Follow-Up     M, chronic illness, PAD, phantum limb pain , DM2 with neuropathy, HTN       HPI lives in [address] [place] [address]  In [facility] for locomotion is a double amputee with phantom limb pain, chronic pain  C/o her BG are rising again at lunch and dinner over 200 and she would like to be at 200 or less, asymptomatic  No issues with constipation or sleep  Still galileo  Has rare nausea and diarrhea, wants zofran and imodium prn       Past Medical History:   Diagnosis Date    CAD (coronary artery disease)     Combined hyperlipidemia     [facility]di[facility] mellitus, type 2) [facility]     DVT (deep venous thrombosis) [facility]     Hypertension     Hypothyroidism     Phantom limb syndrome ([facility] 9/23/2017    PVD (peripheral vascular disease) ([facility]      Past Surgical History:   Procedure Laterality Date    ABOVE KNEE AMPUTATION      left    APPENDECTOMY      CHOLECYSTECTOMY      CORONARY ANGIOPLASTY WITH STENT PLACEMENT      LEG AMPUTATION BELOW KNEE      right     No family history on file. Social History     Social History    Marital status:      Spouse name: N/A    Number of children: N/A    Years of education: N/A     Occupational History    Not on file. Social History Main Topics    Smoking status: Unknown If Ever Smoked    Smokeless tobacco: Never Used    Alcohol use No    Drug use: Unknown    Sexual activity: Not on file     Other Topics Concern    Not on file     Social History Narrative    No narrative on file       Allergies: Pcn [penicillins]  NF MEDICATIONS REVIEWED    ROS:  See HPI  Constitutional: There are no reports of  change in appetite, fever, or weakness. Respiratory: denies SOB, dyspnea, dyspnea on exertion,   Cardiovascular: denies CP, lightheadedness, palpitations, PND. GI: No N/V/D.    : no reports of

## 2018-01-23 DIAGNOSIS — G89.4 CHRONIC PAIN DISORDER: Primary | ICD-10-CM

## 2018-01-23 RX ORDER — OXYCODONE HYDROCHLORIDE AND ACETAMINOPHEN 5; 325 MG/1; MG/1
1 TABLET ORAL 2 TIMES DAILY
Qty: 30 TABLET | Refills: 0 | Status: SHIPPED | OUTPATIENT
Start: 2018-01-23 | End: 2018-02-07

## 2018-01-29 ENCOUNTER — OFFICE VISIT (OUTPATIENT)
Dept: GERIATRIC MEDICINE | Age: 83
End: 2018-01-29
Payer: MEDICARE

## 2018-01-29 DIAGNOSIS — T50.901A MEDICATION ADMINISTERED IN ERROR, ACCIDENTAL OR UNINTENTIONAL, INITIAL ENCOUNTER: Primary | ICD-10-CM

## 2018-01-29 PROCEDURE — 99307 SBSQ NF CARE SF MDM 10: CPT | Performed by: NURSE PRACTITIONER

## 2018-01-29 PROCEDURE — 1123F ACP DISCUSS/DSCN MKR DOCD: CPT | Performed by: NURSE PRACTITIONER

## 2018-01-30 VITALS
HEART RATE: 60 BPM | SYSTOLIC BLOOD PRESSURE: 177 MMHG | RESPIRATION RATE: 16 BRPM | HEIGHT: 55 IN | DIASTOLIC BLOOD PRESSURE: 90 MMHG | OXYGEN SATURATION: 97 % | WEIGHT: 142 LBS | BODY MASS INDEX: 32.86 KG/M2

## 2018-02-11 NOTE — PROGRESS NOTES
Wt 142 lb (64.4 kg)   SpO2 97%   BMI 34.24 kg/m²   Constitutional: Awake and alert sitting up in wheel chair  Cardiovascular: Regular rate  -c/r  Respiratory: LCTA  Extremities: no edema    ASSESSMENT:    1. Medication administered in error, accidental or unintentional, initial encounter         PLAN:   1. Adjusted BP medications for rest of day,   Pt is stable , no adverse effects have occurred. Return if symptoms worsen or fail to improve.     Kelly William, APRN-CNP      Kelly William DNP, MSN, RN, GNP-BC, NP-C  Adult/Jignesh Nurse Practitioner  [facility] [address]  Department of Geriatrics  8:30am-4:30pm   [phone]  After hours Answering service [phone]

## 2018-02-27 ENCOUNTER — OFFICE VISIT (OUTPATIENT)
Dept: GERIATRIC MEDICINE | Age: 83
End: 2018-02-27
Payer: MEDICARE

## 2018-02-27 DIAGNOSIS — M15.9 PRIMARY OSTEOARTHRITIS INVOLVING MULTIPLE JOINTS: ICD-10-CM

## 2018-02-27 DIAGNOSIS — E11.9 TYPE 2 DIABETES MELLITUS WITHOUT COMPLICATION, UNSPECIFIED LONG TERM INSULIN USE STATUS: Primary | ICD-10-CM

## 2018-02-27 DIAGNOSIS — I10 ESSENTIAL HYPERTENSION: ICD-10-CM

## 2018-02-27 PROCEDURE — 99308 SBSQ NF CARE LOW MDM 20: CPT | Performed by: INTERNAL MEDICINE

## 2018-02-27 PROCEDURE — 1123F ACP DISCUSS/DSCN MKR DOCD: CPT | Performed by: INTERNAL MEDICINE

## 2018-03-08 VITALS — HEART RATE: 60 BPM | SYSTOLIC BLOOD PRESSURE: 177 MMHG | DIASTOLIC BLOOD PRESSURE: 90 MMHG | TEMPERATURE: 97.4 F

## 2018-03-12 ENCOUNTER — OFFICE VISIT (OUTPATIENT)
Dept: GERIATRIC MEDICINE | Age: 83
End: 2018-03-12
Payer: MEDICARE

## 2018-03-12 DIAGNOSIS — E55.9 VITAMIN D DEFICIENCY: ICD-10-CM

## 2018-03-12 DIAGNOSIS — I10 ESSENTIAL HYPERTENSION: ICD-10-CM

## 2018-03-12 DIAGNOSIS — E11.42 TYPE 2 DM WITH DIABETIC NEUROPATHY AFFECTING BOTH SIDES OF BODY (HCC): Primary | ICD-10-CM

## 2018-03-12 DIAGNOSIS — Z86.718 HISTORY OF DVT (DEEP VEIN THROMBOSIS): ICD-10-CM

## 2018-03-12 PROCEDURE — 1123F ACP DISCUSS/DSCN MKR DOCD: CPT | Performed by: NURSE PRACTITIONER

## 2018-03-12 PROCEDURE — 99309 SBSQ NF CARE MODERATE MDM 30: CPT | Performed by: NURSE PRACTITIONER

## 2018-03-13 VITALS
OXYGEN SATURATION: 98 % | BODY MASS INDEX: 34.71 KG/M2 | SYSTOLIC BLOOD PRESSURE: 155 MMHG | HEART RATE: 58 BPM | TEMPERATURE: 98.4 F | HEIGHT: 55 IN | DIASTOLIC BLOOD PRESSURE: 93 MMHG | RESPIRATION RATE: 16 BRPM | WEIGHT: 150 LBS

## 2018-03-22 NOTE — PROGRESS NOTES
PATIENT: Iona Abraham : 1926 DOS: 2018     [facility]    CHIEF COMPLAINT: The patient is being seen for followup of her chronic illnesses, DM 2, hypertension, vitamin D deficiency, previous DVT requiring bilateral foot amputation. HPI: The patient uses electric wheelchair for ambulation. She is fairly social, loves to participate in some activities, enjoys going outside during good weather. She eats and sleeps fairly well without any issue. Blood sugars are once again becoming out of control. We continually have addressed her insulin issues. She is on MDI. She tries to limit some of her snacks, but her diet is somewhat limited since she is in long-term care in the nursing home and she cannot provide her own food. She does have chronic limb pain from the phantom pain, usually either stump bothers her at some point and she is on chronic short-acting narcotic for that in addition to gabapentin, which did give her very good relief. She can participate in her daily activities and ADLs. She makes no complaints at the time of my exam, except that her blood sugar she states are over 200 once again. FAMILY AND SOCIAL HISTORY: See Epic H&P.    MEDICATIONS AND ALLERGIES: Reviewed in nursing facility chart. PAST MEDICAL HISTORY: Chronic kidney disease, hypertension, DM 2, hypothyroidism, CAD, DVT, vitamin D deficiency, and breast cancer. REVIEW OF SYSTEMS: CONSTITUTIONAL: No fever, weight loss, or malaise. HEENT: Denies headache, difficulty chewing. RESPIRATORY: Denies shortness of breath or dyspnea. CARDIOVASCULAR: Denies chest pain, palpitations. GI: Denies nausea, vomiting, diarrhea. : Denies dysuria, frequency. SKIN: No rash, lesion, or itching. EXTREMITIES: No stump issues except for her chronic pain and phantom limb issues. PHYSICAL EXAMINATION: VITAL SIGNS: Weight 150 pounds, temperature 98.4, heart rate 58, respirations 16, 98% O2 saturation, blood pressure is 155/93. CONSTITUTIONAL: She is sitting upright in her wheelchair. She is in no acute distress, awake, alert, pleasant. HEENT: Buccal mucosa moist, pink. RESPIRATORY: Lungs are clear to auscultation. Unlabored respirations. CARDIOVASCULAR: RRR. S1, S2, barely noted. She has a very loud systolic ejection murmur. GI: Nontender, nondistended. Positive bowel sounds. : No CVA tenderness. SKIN: Pink, warm, dry, and intact. EXTREMITIES: Stumps are without edema. She has bilateral prosthetic legs in place. Accu-Cheks 8 a.m. blood sugars are fairly good to under 200 and usually even under 150 consistently. H.s. blood sugars are 190 to 260. Occasionally, she has 300 over the last month. ASSESSMENT AND PLAN:   1.  DM 2 uncontrolled at this time. We will change her short-acting log at dinnertime. We will increase it to 9 units. Also, we will need to do more Accu-Cheks and evaluation. 2.  Hypertension. She is slightly elevated today, but no changes are made at this time. We need to evaluate more blood pressures because usually her blood pressure is under fairly good control and she does get bradycardia from blood pressure medications at time. 3.  Vitamin D deficiency. She is on supplementation. Her annual evaluation is greater than 30.  4.  Phantom limb pain after DVT and foot amputation. She is under good control on her Neurontin and narcotics. We will follow up in 1 month. POC, pertinent labs reviewed. Encourage fluids, good nutrition. Fall interventions are in place.           Electronically Signed By: EAGLE Rinaldi GNP-BC on 03/20/2018 12:47:32  ______________________________  EAGLE Rinaldi, GNP-BC  /[id number]  D: 03/17/2018 19:55:02  T: 03/18/2018 05:09:12    cc: - [facility]

## 2018-05-22 ENCOUNTER — OFFICE VISIT (OUTPATIENT)
Dept: GERIATRIC MEDICINE | Age: 83
End: 2018-05-22
Payer: MEDICARE

## 2018-05-22 DIAGNOSIS — I10 BENIGN ESSENTIAL HTN: ICD-10-CM

## 2018-05-22 DIAGNOSIS — E11.42 TYPE 2 DM WITH DIABETIC NEUROPATHY AFFECTING BOTH SIDES OF BODY (HCC): Primary | ICD-10-CM

## 2018-05-22 DIAGNOSIS — E55.9 VITAMIN D DEFICIENCY: ICD-10-CM

## 2018-05-22 PROCEDURE — 99308 SBSQ NF CARE LOW MDM 20: CPT | Performed by: NURSE PRACTITIONER

## 2018-05-22 PROCEDURE — 1123F ACP DISCUSS/DSCN MKR DOCD: CPT | Performed by: NURSE PRACTITIONER

## 2018-05-23 VITALS
BODY MASS INDEX: 34.71 KG/M2 | HEART RATE: 81 BPM | SYSTOLIC BLOOD PRESSURE: 163 MMHG | HEIGHT: 55 IN | DIASTOLIC BLOOD PRESSURE: 103 MMHG | RESPIRATION RATE: 16 BRPM | WEIGHT: 150 LBS | OXYGEN SATURATION: 98 % | TEMPERATURE: 97.3 F

## 2018-06-05 ENCOUNTER — OFFICE VISIT (OUTPATIENT)
Dept: GERIATRIC MEDICINE | Age: 83
End: 2018-06-05
Payer: MEDICARE

## 2018-06-05 DIAGNOSIS — I10 ESSENTIAL HYPERTENSION: ICD-10-CM

## 2018-06-05 DIAGNOSIS — G62.9 NEUROPATHY: ICD-10-CM

## 2018-06-05 DIAGNOSIS — E11.9 DIABETES MELLITUS WITHOUT COMPLICATION (HCC): Primary | ICD-10-CM

## 2018-06-05 PROCEDURE — 99309 SBSQ NF CARE MODERATE MDM 30: CPT | Performed by: INTERNAL MEDICINE

## 2018-06-05 PROCEDURE — 1123F ACP DISCUSS/DSCN MKR DOCD: CPT | Performed by: INTERNAL MEDICINE

## 2018-06-05 PROCEDURE — 1101F PT FALLS ASSESS-DOCD LE1/YR: CPT | Performed by: INTERNAL MEDICINE

## 2018-06-11 LAB
ALBUMIN SERPL-MCNC: 4.1 G/DL
ALP BLD-CCNC: 99 U/L
ALT SERPL-CCNC: 16 U/L
ANION GAP SERPL CALCULATED.3IONS-SCNC: NORMAL MMOL/L
AST SERPL-CCNC: 18 U/L
BASOPHILS ABSOLUTE: 0.3 /ΜL
BASOPHILS RELATIVE PERCENT: 1.9 %
BILIRUB SERPL-MCNC: 0.5 MG/DL (ref 0.1–1.4)
BUN BLDV-MCNC: 20 MG/DL
CALCIUM SERPL-MCNC: 9.6 MG/DL
CHLORIDE BLD-SCNC: 106 MMOL/L
CHOLESTEROL, TOTAL: 140 MG/DL
CHOLESTEROL/HDL RATIO: 1.3
CO2: 22 MMOL/L
CREAT SERPL-MCNC: 1.5 MG/DL
EOSINOPHILS ABSOLUTE: 1.1 /ΜL
EOSINOPHILS RELATIVE PERCENT: 8.3 %
GFR CALCULATED: NORMAL
GLUCOSE BLD-MCNC: 101 MG/DL
HCT VFR BLD CALC: 36.7 % (ref 36–46)
HDLC SERPL-MCNC: 35 MG/DL (ref 35–70)
HEMOGLOBIN: 11.8 G/DL (ref 12–16)
LDL CHOLESTEROL CALCULATED: 46 MG/DL (ref 0–160)
LYMPHOCYTES ABSOLUTE: 2.5 /ΜL
LYMPHOCYTES RELATIVE PERCENT: 18.2 %
MCH RBC QN AUTO: 29.7 PG
MCHC RBC AUTO-ENTMCNC: 32.3 G/DL
MCV RBC AUTO: 91.9 FL
MONOCYTES ABSOLUTE: 1.4 /ΜL
MONOCYTES RELATIVE PERCENT: 10 %
NEUTROPHILS ABSOLUTE: 8.4 /ΜL
NEUTROPHILS RELATIVE PERCENT: 61.6 %
PLATELET # BLD: 301 K/ΜL
PMV BLD AUTO: 9.3 FL
POTASSIUM SERPL-SCNC: 3.8 MMOL/L
RBC # BLD: 3.99 10^6/ΜL
SODIUM BLD-SCNC: 140 MMOL/L
TOTAL PROTEIN: 6.6
TRIGL SERPL-MCNC: 295 MG/DL
VITAMIN D 25-HYDROXY: 47
VITAMIN D2, 25 HYDROXY: NORMAL
VITAMIN D3,25 HYDROXY: NORMAL
VLDLC SERPL CALC-MCNC: 59 MG/DL
WBC # BLD: 13.7 10^3/ML

## 2018-06-29 ENCOUNTER — OFFICE VISIT (OUTPATIENT)
Dept: GERIATRIC MEDICINE | Age: 83
End: 2018-06-29
Payer: MEDICARE

## 2018-06-29 DIAGNOSIS — E16.2 HYPOGLYCEMIA: Primary | ICD-10-CM

## 2018-06-29 PROCEDURE — 1123F ACP DISCUSS/DSCN MKR DOCD: CPT | Performed by: INTERNAL MEDICINE

## 2018-06-29 PROCEDURE — 99308 SBSQ NF CARE LOW MDM 20: CPT | Performed by: INTERNAL MEDICINE

## 2018-06-29 PROCEDURE — 1101F PT FALLS ASSESS-DOCD LE1/YR: CPT | Performed by: INTERNAL MEDICINE

## 2018-08-27 ENCOUNTER — OFFICE VISIT (OUTPATIENT)
Dept: GERIATRIC MEDICINE | Age: 83
End: 2018-08-27
Payer: MEDICARE

## 2018-08-27 DIAGNOSIS — E11.9 DIABETES MELLITUS WITHOUT COMPLICATION (HCC): Primary | ICD-10-CM

## 2018-08-27 DIAGNOSIS — I73.9 PVD (PERIPHERAL VASCULAR DISEASE) (HCC): ICD-10-CM

## 2018-08-27 DIAGNOSIS — G62.9 NEUROPATHY: ICD-10-CM

## 2018-08-27 PROCEDURE — 1123F ACP DISCUSS/DSCN MKR DOCD: CPT | Performed by: INTERNAL MEDICINE

## 2018-08-27 PROCEDURE — 1101F PT FALLS ASSESS-DOCD LE1/YR: CPT | Performed by: INTERNAL MEDICINE

## 2018-08-27 PROCEDURE — 99309 SBSQ NF CARE MODERATE MDM 30: CPT | Performed by: INTERNAL MEDICINE

## 2018-08-28 DIAGNOSIS — G89.29 OTHER CHRONIC PAIN: Primary | ICD-10-CM

## 2018-08-28 RX ORDER — OXYCODONE HYDROCHLORIDE AND ACETAMINOPHEN 5; 325 MG/1; MG/1
1 TABLET ORAL 2 TIMES DAILY
Qty: 60 TABLET | Refills: 0 | Status: SHIPPED | OUTPATIENT
Start: 2018-08-28 | End: 2018-09-27 | Stop reason: SDUPTHER

## 2018-09-27 DIAGNOSIS — G89.29 OTHER CHRONIC PAIN: ICD-10-CM

## 2018-09-28 RX ORDER — OXYCODONE HYDROCHLORIDE AND ACETAMINOPHEN 5; 325 MG/1; MG/1
1 TABLET ORAL 2 TIMES DAILY
Qty: 60 TABLET | Refills: 0 | Status: SHIPPED | OUTPATIENT
Start: 2018-09-28 | End: 2018-10-22 | Stop reason: SDUPTHER

## 2018-10-01 ENCOUNTER — OFFICE VISIT (OUTPATIENT)
Dept: GERIATRIC MEDICINE | Age: 83
End: 2018-10-01
Payer: MEDICARE

## 2018-10-01 DIAGNOSIS — M25.512 CHRONIC LEFT SHOULDER PAIN: ICD-10-CM

## 2018-10-01 DIAGNOSIS — G89.29 CHRONIC LEFT SHOULDER PAIN: ICD-10-CM

## 2018-10-01 PROCEDURE — 99308 SBSQ NF CARE LOW MDM 20: CPT | Performed by: PHYSICIAN ASSISTANT

## 2018-10-22 DIAGNOSIS — G89.29 OTHER CHRONIC PAIN: ICD-10-CM

## 2018-10-22 RX ORDER — OXYCODONE HYDROCHLORIDE AND ACETAMINOPHEN 5; 325 MG/1; MG/1
1 TABLET ORAL 2 TIMES DAILY
Qty: 60 TABLET | Refills: 0 | Status: SHIPPED | OUTPATIENT
Start: 2018-10-22 | End: 2018-11-08 | Stop reason: SDUPTHER

## 2018-10-30 PROBLEM — G89.29 CHRONIC LEFT SHOULDER PAIN: Status: ACTIVE | Noted: 2018-10-30

## 2018-10-30 PROBLEM — M25.512 CHRONIC LEFT SHOULDER PAIN: Status: ACTIVE | Noted: 2018-10-30

## 2018-11-05 ENCOUNTER — OFFICE VISIT (OUTPATIENT)
Dept: GERIATRIC MEDICINE | Age: 83
End: 2018-11-05
Payer: MEDICARE

## 2018-11-05 DIAGNOSIS — N18.30 STAGE 3 CHRONIC KIDNEY DISEASE (HCC): ICD-10-CM

## 2018-11-05 DIAGNOSIS — E11.42 TYPE 2 DM WITH DIABETIC NEUROPATHY AFFECTING BOTH SIDES OF BODY (HCC): ICD-10-CM

## 2018-11-05 DIAGNOSIS — I10 ESSENTIAL HYPERTENSION: Primary | ICD-10-CM

## 2018-11-05 PROCEDURE — 99307 SBSQ NF CARE SF MDM 10: CPT | Performed by: PHYSICIAN ASSISTANT

## 2018-11-08 DIAGNOSIS — G89.29 OTHER CHRONIC PAIN: ICD-10-CM

## 2018-11-09 RX ORDER — OXYCODONE HYDROCHLORIDE AND ACETAMINOPHEN 5; 325 MG/1; MG/1
1 TABLET ORAL EVERY 8 HOURS PRN
Qty: 90 TABLET | Refills: 0
Start: 2018-11-09 | End: 2018-12-03 | Stop reason: SDUPTHER

## 2018-11-12 NOTE — PROGRESS NOTES
Hypothyroidism     Phantom limb syndrome ([facility] 9/23/2017    PVD (peripheral vascular disease) ([facility]      Past Surgical History:   Procedure Laterality Date    ABOVE KNEE AMPUTATION      left    APPENDECTOMY      CHOLECYSTECTOMY      CORONARY ANGIOPLASTY WITH STENT PLACEMENT      LEG AMPUTATION BELOW KNEE      right     Social History     Social History    Marital status:      Spouse name: N/A    Number of children: N/A    Years of education: N/A     Occupational History    Not on file. Social History Main Topics    Smoking status: Unknown If Ever Smoked    Smokeless tobacco: Never Used    Alcohol use No    Drug use: Unknown    Sexual activity: Not on file     Other Topics Concern    Not on file     Social History Narrative    No narrative on file     No family history on file. Allergies   Allergen Reactions    Pcn [Penicillins]          Review of Systems  Constitutional patient denies fever/chills, nausea or vomiting, increased weakness or fatigue. HEENT: Patient denies any headache, trauma, confusion or lightheadedness. Patient does complain of some recent sneezing episodes. Patient states that she does have some \"seasonal allergies  that occur each year around this time \". Cardiac: Patient denies any chest pain or palpitations. Pulmonary: Patient denies cough, #breath, pain inspiration or wheezing. G I: Denies diarrhea, constipation, or any abdominal pain. : Denies any urinary symptoms, denies dysuria or discharge or dysuria. MSK: Patient does complain of some mild to moderate left shoulder pain that is greater than her right shoulder. Patient states that she can live with it and does not want pain meds increased. Objective: There were no vitals taken for this visit. Vitals are all WNL. Please see 'Point. Click. Care'. Physical Exam  Gen. : Patient is observed to have normal hygiene, normal affect, appears on Scott is no acute distress at this time.   Eyes: Pupils are

## 2018-11-23 VITALS — SYSTOLIC BLOOD PRESSURE: 134 MMHG | TEMPERATURE: 97.6 F | DIASTOLIC BLOOD PRESSURE: 60 MMHG | HEART RATE: 82 BPM

## 2018-12-03 DIAGNOSIS — G89.29 OTHER CHRONIC PAIN: ICD-10-CM

## 2018-12-03 RX ORDER — OXYCODONE HYDROCHLORIDE AND ACETAMINOPHEN 5; 325 MG/1; MG/1
1 TABLET ORAL EVERY 8 HOURS PRN
Qty: 90 TABLET | Refills: 0 | Status: SHIPPED | OUTPATIENT
Start: 2018-12-03 | End: 2018-12-31 | Stop reason: SDUPTHER

## 2018-12-14 ENCOUNTER — OFFICE VISIT (OUTPATIENT)
Dept: GERIATRIC MEDICINE | Age: 83
End: 2018-12-14
Payer: MEDICARE

## 2018-12-14 DIAGNOSIS — M13.0 POLYARTHRITIS: ICD-10-CM

## 2018-12-14 DIAGNOSIS — E11.42 TYPE 2 DM WITH DIABETIC NEUROPATHY AFFECTING BOTH SIDES OF BODY (HCC): Primary | ICD-10-CM

## 2018-12-14 DIAGNOSIS — N18.30 STAGE 3 CHRONIC KIDNEY DISEASE (HCC): ICD-10-CM

## 2018-12-14 PROCEDURE — 99308 SBSQ NF CARE LOW MDM 20: CPT | Performed by: PHYSICIAN ASSISTANT

## 2018-12-14 PROCEDURE — 1123F ACP DISCUSS/DSCN MKR DOCD: CPT | Performed by: PHYSICIAN ASSISTANT

## 2018-12-14 PROCEDURE — 1101F PT FALLS ASSESS-DOCD LE1/YR: CPT | Performed by: PHYSICIAN ASSISTANT

## 2018-12-14 PROCEDURE — G8484 FLU IMMUNIZE NO ADMIN: HCPCS | Performed by: PHYSICIAN ASSISTANT

## 2018-12-31 DIAGNOSIS — G89.29 OTHER CHRONIC PAIN: ICD-10-CM

## 2019-01-03 RX ORDER — OXYCODONE HYDROCHLORIDE AND ACETAMINOPHEN 5; 325 MG/1; MG/1
1 TABLET ORAL EVERY 8 HOURS PRN
Qty: 90 TABLET | Refills: 0 | Status: SHIPPED | OUTPATIENT
Start: 2019-01-03 | End: 2019-01-21 | Stop reason: SDUPTHER

## 2019-01-21 DIAGNOSIS — G89.29 OTHER CHRONIC PAIN: ICD-10-CM

## 2019-01-22 ENCOUNTER — OFFICE VISIT (OUTPATIENT)
Dept: GERIATRIC MEDICINE | Age: 84
End: 2019-01-22
Payer: MEDICARE

## 2019-01-22 DIAGNOSIS — M15.9 OSTEOARTHRITIS OF MULTIPLE JOINTS, UNSPECIFIED OSTEOARTHRITIS TYPE: ICD-10-CM

## 2019-01-22 DIAGNOSIS — E13.42 DIABETIC POLYNEUROPATHY ASSOCIATED WITH OTHER SPECIFIED DIABETES MELLITUS (HCC): Primary | ICD-10-CM

## 2019-01-22 DIAGNOSIS — I10 ESSENTIAL HYPERTENSION: ICD-10-CM

## 2019-01-22 PROCEDURE — 1101F PT FALLS ASSESS-DOCD LE1/YR: CPT | Performed by: INTERNAL MEDICINE

## 2019-01-22 PROCEDURE — G8484 FLU IMMUNIZE NO ADMIN: HCPCS | Performed by: INTERNAL MEDICINE

## 2019-01-22 PROCEDURE — 99309 SBSQ NF CARE MODERATE MDM 30: CPT | Performed by: INTERNAL MEDICINE

## 2019-01-22 PROCEDURE — 1123F ACP DISCUSS/DSCN MKR DOCD: CPT | Performed by: INTERNAL MEDICINE

## 2019-01-25 RX ORDER — OXYCODONE HYDROCHLORIDE AND ACETAMINOPHEN 5; 325 MG/1; MG/1
1 TABLET ORAL EVERY 8 HOURS PRN
Qty: 90 TABLET | Refills: 0 | Status: SHIPPED | OUTPATIENT
Start: 2019-01-25 | End: 2019-02-24

## 2019-02-17 ASSESSMENT — PATIENT HEALTH QUESTIONNAIRE - PHQ9
1. LITTLE INTEREST OR PLEASURE IN DOING THINGS: 0
SUM OF ALL RESPONSES TO PHQ QUESTIONS 1-9: 0
2. FEELING DOWN, DEPRESSED OR HOPELESS: 0
SUM OF ALL RESPONSES TO PHQ QUESTIONS 1-9: 0
SUM OF ALL RESPONSES TO PHQ9 QUESTIONS 1 & 2: 0

## 2019-03-04 RX ORDER — OXYCODONE HYDROCHLORIDE AND ACETAMINOPHEN 5; 325 MG/1; MG/1
1 TABLET ORAL EVERY 8 HOURS PRN
COMMUNITY
End: 2019-03-22 | Stop reason: SDUPTHER

## 2019-03-11 ENCOUNTER — OFFICE VISIT (OUTPATIENT)
Dept: GERIATRIC MEDICINE | Age: 84
End: 2019-03-11
Payer: MEDICARE

## 2019-03-11 DIAGNOSIS — I10 ESSENTIAL HYPERTENSION: Primary | ICD-10-CM

## 2019-03-11 DIAGNOSIS — I73.9 PAD (PERIPHERAL ARTERY DISEASE) (HCC): ICD-10-CM

## 2019-03-11 PROCEDURE — 1101F PT FALLS ASSESS-DOCD LE1/YR: CPT | Performed by: PHYSICIAN ASSISTANT

## 2019-03-11 PROCEDURE — G8484 FLU IMMUNIZE NO ADMIN: HCPCS | Performed by: PHYSICIAN ASSISTANT

## 2019-03-11 PROCEDURE — 1123F ACP DISCUSS/DSCN MKR DOCD: CPT | Performed by: PHYSICIAN ASSISTANT

## 2019-03-11 PROCEDURE — 99308 SBSQ NF CARE LOW MDM 20: CPT | Performed by: PHYSICIAN ASSISTANT

## 2019-03-11 RX ORDER — OXYCODONE HYDROCHLORIDE AND ACETAMINOPHEN 5; 325 MG/1; MG/1
1 TABLET ORAL EVERY 8 HOURS PRN
OUTPATIENT
Start: 2019-03-11

## 2019-03-18 ENCOUNTER — OFFICE VISIT (OUTPATIENT)
Dept: GERIATRIC MEDICINE | Age: 84
End: 2019-03-18
Payer: MEDICARE

## 2019-03-18 DIAGNOSIS — I73.9 PVD (PERIPHERAL VASCULAR DISEASE) (HCC): ICD-10-CM

## 2019-03-18 DIAGNOSIS — E11.9 DIABETES MELLITUS WITHOUT COMPLICATION (HCC): Primary | ICD-10-CM

## 2019-03-18 DIAGNOSIS — M15.9 OSTEOARTHRITIS OF MULTIPLE JOINTS, UNSPECIFIED OSTEOARTHRITIS TYPE: ICD-10-CM

## 2019-03-18 DIAGNOSIS — G62.9 NEUROPATHY: ICD-10-CM

## 2019-03-18 PROCEDURE — 99309 SBSQ NF CARE MODERATE MDM 30: CPT | Performed by: INTERNAL MEDICINE

## 2019-03-18 PROCEDURE — 1123F ACP DISCUSS/DSCN MKR DOCD: CPT | Performed by: INTERNAL MEDICINE

## 2019-03-18 PROCEDURE — G8484 FLU IMMUNIZE NO ADMIN: HCPCS | Performed by: INTERNAL MEDICINE

## 2019-03-22 DIAGNOSIS — E13.42 DIABETIC POLYNEUROPATHY ASSOCIATED WITH OTHER SPECIFIED DIABETES MELLITUS (HCC): ICD-10-CM

## 2019-03-22 DIAGNOSIS — G89.29 OTHER CHRONIC PAIN: Primary | ICD-10-CM

## 2019-03-25 RX ORDER — OXYCODONE HYDROCHLORIDE AND ACETAMINOPHEN 5; 325 MG/1; MG/1
1 TABLET ORAL EVERY 8 HOURS PRN
Qty: 90 TABLET | Refills: 0 | Status: SHIPPED | OUTPATIENT
Start: 2019-03-25 | End: 2019-04-24

## 2019-03-28 NOTE — PROGRESS NOTES
PATIENT: Rama Luque : 1926 DOS: 2019     [facility]    Seen for a routine monthly visit for her diabetes, hypothyroidism, degenerative joint disease, weakness, neuropathy status post amputation in lower extremities. SUBJECTIVE:  The patient is clinically stable. The patient has been complained today that her blood sugars have been trending low per her own sentiment. The patient subsequently has to eat increased amount of food at night, which she does not want to do. The patient is receiving 48 subcu Lantus at night; otherwise, she has been clinically stable. REVIEW OF SYSTEMS:  Denied chest pain, palpitations, nausea, vomiting. OBJECTIVE:  She appeared at her baseline. Pupils are small, equal, reactive. Oral mucosa is moist.  No thrush. No dysarthria. Chest showed no crackles, no wheezing. Cardiovascular exam showed a regular rate. Abdomen is soft, nontender. Extremities showed bilateral amputations. ASSESSMENT AND PLAN:  1. Diabetes: The patient's blood sugars have been well controlled, but she actually does monitor to run a little higher at night because she feels that she has hypoglycemia, as such she has refused her medications 2 attempts and while she is taking medication, the patient is forced to take increased amount per my recollection. We will decrease her Lantus to 35 units subcu at night. 2.   Degenerative joint disease:  No new pain crisis. 3.   Peripheral vascular disease:  The patient is at her baseline. She is status post bilateral amputations. 4.   Neuropathy:  The patient remains on increased dose of Neurontin. We will monitor.         Electronically Signed By: Billy Tirado M.D. on 2019 11:39:20  ______________________________  Billy Tirado M.D.  [id number]  D: 2019 16:26:41  T: 2019 10:36:39    cc: - [facility]

## 2019-04-05 ENCOUNTER — OFFICE VISIT (OUTPATIENT)
Dept: GERIATRIC MEDICINE | Age: 84
End: 2019-04-05
Payer: MEDICARE

## 2019-04-05 DIAGNOSIS — Z86.718 HISTORY OF DVT (DEEP VEIN THROMBOSIS): Primary | ICD-10-CM

## 2019-04-05 PROCEDURE — 99309 SBSQ NF CARE MODERATE MDM 30: CPT | Performed by: PHYSICIAN ASSISTANT

## 2019-04-05 PROCEDURE — 1123F ACP DISCUSS/DSCN MKR DOCD: CPT | Performed by: PHYSICIAN ASSISTANT

## 2019-04-15 NOTE — PROGRESS NOTES
PATIENT: Hiro Latif : 1926 DOS: 2019     [facility]      HPI: The patient is being seen today for followup for her new-onset Xarelto therapy. The patient was recently discontinued from heparin, status post bilateral leg DVTs. The patient is doing well at this time. States no new complaints. No evident chest pain, shortness of breath, lower extremity pain at this time. No headaches. No change in mental status. Denies any hematuria, hematomas, or bruising/petechiae or purpura. The patient overall presenting at baseline. The patient offering no acute complaints beyond that. We will continue current therapy and follow up routinely as per usual.    MEDICATIONS: Reviewed. REVIEW OF SYSTEMS: HPI shows pertinent review of systems. Remaining 14-point review of systems unremarkable. VS: Vital signs reviewed on site. PHYSICAL EXAMINATION: GENERAL: Good hygiene, bright affect, appears stated age. No distress. Pupils equal, round, reactive to light. No icterus or injection. Moist mucous membranes. No erythema or exudate. CARDIAC: Shows regular rate and rhythm. No JVD. Significant lower leg edema due to amputations. PULMONARY: Lungs CTA. Normal entry and effort. No wheezes or rhonchi. ABDOMEN: Normal bowel sounds, soft, nontender abdomen. No distention. SKIN: No new scars, bruises, or masses. Skin turgor is within normal limits. The patient's mental status is awake and alert, oriented 3/3. Very good memory and command following. ASSESSMENT AND PLAN:   1. s/p bilateral DVT / New anticoagulant therapy - we will continue to order serial BMPs q.1 month x2-3 months at most to ensure patient's kidney function stays within normal limits. No further issues at this time. The patient is handling therapy very well.          Electronically Signed By: Fallon Ng PA-C on 2019 17:34:50  ______________________________  Fallon Ng PA-C [id number]  D: 04/10/2019 19:54:00  T: 2019 16:03:44    cc: [facility]

## 2019-04-29 ENCOUNTER — OFFICE VISIT (OUTPATIENT)
Dept: GERIATRIC MEDICINE | Age: 84
End: 2019-04-29
Payer: MEDICARE

## 2019-04-29 DIAGNOSIS — G54.6 PHANTOM LIMB PAIN (HCC): Primary | ICD-10-CM

## 2019-04-29 DIAGNOSIS — Z86.718 HISTORY OF DVT (DEEP VEIN THROMBOSIS): ICD-10-CM

## 2019-04-29 DIAGNOSIS — N18.30 STAGE 3 CHRONIC KIDNEY DISEASE (HCC): ICD-10-CM

## 2019-04-29 PROCEDURE — 99308 SBSQ NF CARE LOW MDM 20: CPT | Performed by: PHYSICIAN ASSISTANT

## 2019-04-29 PROCEDURE — 1123F ACP DISCUSS/DSCN MKR DOCD: CPT | Performed by: PHYSICIAN ASSISTANT

## 2019-05-06 DIAGNOSIS — G89.29 OTHER CHRONIC PAIN: Primary | ICD-10-CM

## 2019-05-06 RX ORDER — OXYCODONE HYDROCHLORIDE AND ACETAMINOPHEN 5; 325 MG/1; MG/1
1 TABLET ORAL EVERY 8 HOURS PRN
Qty: 90 TABLET | Refills: 0 | Status: SHIPPED | OUTPATIENT
Start: 2019-05-06 | End: 2019-06-05

## 2019-05-06 RX ORDER — OXYCODONE HYDROCHLORIDE AND ACETAMINOPHEN 5; 325 MG/1; MG/1
1 TABLET ORAL EVERY 8 HOURS PRN
COMMUNITY
End: 2019-05-06 | Stop reason: SDUPTHER

## 2019-05-16 NOTE — PROGRESS NOTES
PATIENT: Bhumi Valladares : 1926 DOS: 2019     [facility]    VITAL SIGNS:  The patient's vital signs today reviewed on-site. HISTORY OF PRESENT ILLNESS:  The patient being seen today for routine monthly follow up for a history of phantom limb syndrome, stage III chronic kidney disease as well as history of DVT. The patient currently on adequate pain medications she states for her symptoms. Currently receives Tylenol as well as Percocet and gabapentin routinely. The patient has no complaints currently. She states that she gets phantom pains every once in a while, but nothing overly dramatic. The patient's BMP levels have been steady and remain at baseline. No new acute changes. Currently the patient takes in adequate oral fluids and overall intake. Last CMP showed a BUN of 20 in 2018. The patient has remained baseline without any acute symptoms. The patient was recently treated for bilateral lower leg DVTs with Lovenox, successfully treated. The patient does not complain of any lower extremity pain at this time. Continuing on oral anticoagulation without problems. The patient offers no new complaints. MEDICATIONS:  Reviewed. REVIEW OF SYSTEMS:  CONSTITUTIONAL:  No new fever, chills, nausea, fatigue, vomiting, weakness, diaphoresis. No acute changes in weight. HEENT:  No new headache, confusion, rhinorrhea, stuffiness, sneezing. CARDIAC:  Denies chest pain, palpitations. The patient also denies lower extremity stump claudication. The patient ambulates in an electric wheelchair. PULMONARY:  Denies cough, SOB, POI or wheezing. GI:  No new GI complaints. No constipation, abdominal pain or diarrhea. PSYCHIATRIC:  The patient's mood has been stable at baseline. PHYSICAL EXAMINATION:  GENERAL:  Normal hygiene, bright affect. Appears own age. No distress today. The patient is in wheelchair in room where she is sitting, reading a newspaper.   HEENT:

## 2019-05-30 ENCOUNTER — OFFICE VISIT (OUTPATIENT)
Dept: GERIATRIC MEDICINE | Age: 84
End: 2019-05-30
Payer: MEDICARE

## 2019-05-30 DIAGNOSIS — E11.9 DIABETES MELLITUS WITHOUT COMPLICATION (HCC): Primary | ICD-10-CM

## 2019-05-30 DIAGNOSIS — I10 ESSENTIAL HYPERTENSION: ICD-10-CM

## 2019-05-30 DIAGNOSIS — G62.9 NEUROPATHY: ICD-10-CM

## 2019-05-30 PROCEDURE — 1123F ACP DISCUSS/DSCN MKR DOCD: CPT | Performed by: INTERNAL MEDICINE

## 2019-05-30 PROCEDURE — 99309 SBSQ NF CARE MODERATE MDM 30: CPT | Performed by: INTERNAL MEDICINE

## 2019-06-10 ENCOUNTER — OFFICE VISIT (OUTPATIENT)
Dept: GERIATRIC MEDICINE | Age: 84
End: 2019-06-10
Payer: MEDICARE

## 2019-06-10 DIAGNOSIS — E78.5 HYPERLIPIDEMIA, UNSPECIFIED HYPERLIPIDEMIA TYPE: ICD-10-CM

## 2019-06-10 DIAGNOSIS — K59.01 SLOW TRANSIT CONSTIPATION: ICD-10-CM

## 2019-06-10 DIAGNOSIS — Z87.2: Primary | ICD-10-CM

## 2019-06-10 PROCEDURE — 99308 SBSQ NF CARE LOW MDM 20: CPT | Performed by: PHYSICIAN ASSISTANT

## 2019-06-10 PROCEDURE — 1123F ACP DISCUSS/DSCN MKR DOCD: CPT | Performed by: PHYSICIAN ASSISTANT

## 2019-06-14 ENCOUNTER — OFFICE VISIT (OUTPATIENT)
Dept: GERIATRIC MEDICINE | Age: 84
End: 2019-06-14
Payer: MEDICARE

## 2019-06-14 DIAGNOSIS — F55.8: Primary | ICD-10-CM

## 2019-06-14 PROCEDURE — 99307 SBSQ NF CARE SF MDM 10: CPT | Performed by: PHYSICIAN ASSISTANT

## 2019-06-14 PROCEDURE — 1123F ACP DISCUSS/DSCN MKR DOCD: CPT | Performed by: PHYSICIAN ASSISTANT

## 2019-06-19 VITALS
DIASTOLIC BLOOD PRESSURE: 81 MMHG | OXYGEN SATURATION: 97 % | HEART RATE: 53 BPM | WEIGHT: 152.8 LBS | BODY MASS INDEX: 36.84 KG/M2 | RESPIRATION RATE: 18 BRPM | SYSTOLIC BLOOD PRESSURE: 125 MMHG

## 2019-06-19 NOTE — PROGRESS NOTES
PATIENT: Velia Spears : 1926 DOS: 06/10/2019     [facility]    HISTORY OF PRESENT ILLNESS:  The patient is being seen today for routine visit for monthly followup of history of dermatitis, hyperlipidemia and slow transit constipation. The patient does not have any skin complaints at this time. The patient states that she has at baseline with no acute changes since I last saw her over a month ago. The patient's blood lipids seem to be fairly static and well-controlled since last lab was drawn. The patient has p.r.n. medications for constipation. Does not complain of any acute abdomen like symptoms or constipation at the moment. She states that she has regular bowel movements daily. No new complaints. The patient is very pleasant and cooperative during visit, has a pleasure to visit with. MEDICATIONS:  Reviewed. REVIEW OF SYSTEMS:  CONSTITUTIONAL:  The patient is afebrile. No vomiting, nausea, fatigue or increased weakness. HEENT:  No LOC, headache, confusion, lightheadedness. NEUROLOGIC:  Denies any recent syncope. ENT:  Denies any rhinorrhea, stuffiness, sneezing, sore throat, hoarseness. CARDIAC:  Denies chest pain, palpitations, orthopnea or RODRIGUEZ at the moment, however she does have a history of RODRIGUEZ and orthopnea from previous discussions. PULMONARY:  No new cough, SOB, POI or wheezing. SKIN:  Denies any new itching, rashes, sores or evidence of acute dermatitis. PSYCHIATRIC:  Mood is stable, well within normal limits. PHYSICAL EXAMINATION: VITAL SIGNS:  Reviewed on site. /81, respirations 18, pulse ox 97% on room air, pulse of 53 bpm, blood glucose 163 mg/dL. Weight 152. 8.  room. No acute distress noted. HEENT:  Pupils equal, round, reactive to light. No injection or icterus. MMM without erythema or exudate. No evidence of thrush. CARDIAC:  Regular rate and rhythm. One perceived ectopic beat noted. Negative lower extremity edema.   The patient does have amputations to bilateral lower extremities. PULMONARY:  LSCTA. ABDOMEN:  Normal bowel sounds. MENTAL STATUS:  The patient is awake, alert, oriented 3/3, good command following, good memory. SKIN:  The patient's skin is intact globally. No new abrasions, cuts or sores. No evidence of any dermatitis. Limited skin exam.  No complaints however. ASSESSMENT AND PLAN:  1. History of dermatitis - patient is well controlled. No new orders at this time. 2.   Hyperlipidemia - no acute issue with lipid levels. We will maintain on current therapy. 3.   Slow transit constipation - will maintain p.r.n. medications as ordered. We will follow up if patient does not have bowel movement for greater than 3 days.         Electronically Signed By: So Matt PA-C on 06/14/2019 17:39:54  ______________________________  LADONNA Jung/[id number]  D: 06/10/2019 18:56:26  T: 06/10/2019 21:45:10    cc: - [employer]

## 2019-07-11 ENCOUNTER — OFFICE VISIT (OUTPATIENT)
Dept: GERIATRIC MEDICINE | Age: 84
End: 2019-07-11
Payer: MEDICAID

## 2019-07-11 DIAGNOSIS — G54.6 PHANTOM PAIN (HCC): Primary | ICD-10-CM

## 2019-07-11 DIAGNOSIS — I73.9 PVD (PERIPHERAL VASCULAR DISEASE) (HCC): ICD-10-CM

## 2019-07-11 DIAGNOSIS — E11.9 DIABETES MELLITUS WITHOUT COMPLICATION (HCC): ICD-10-CM

## 2019-07-11 PROCEDURE — 99309 SBSQ NF CARE MODERATE MDM 30: CPT | Performed by: INTERNAL MEDICINE

## 2019-07-19 ENCOUNTER — OFFICE VISIT (OUTPATIENT)
Dept: GERIATRIC MEDICINE | Age: 84
End: 2019-07-19
Payer: MEDICARE

## 2019-07-19 DIAGNOSIS — G54.7 PHANTOM LIMB SYNDROME (HCC): ICD-10-CM

## 2019-07-19 DIAGNOSIS — Z51.81 ANTICOAGULATION MANAGEMENT ENCOUNTER: Primary | ICD-10-CM

## 2019-07-19 DIAGNOSIS — Z79.01 ANTICOAGULATION MANAGEMENT ENCOUNTER: Primary | ICD-10-CM

## 2019-07-19 PROCEDURE — 1123F ACP DISCUSS/DSCN MKR DOCD: CPT | Performed by: PHYSICIAN ASSISTANT

## 2019-07-19 PROCEDURE — 99308 SBSQ NF CARE LOW MDM 20: CPT | Performed by: PHYSICIAN ASSISTANT

## 2019-07-25 ENCOUNTER — OFFICE VISIT (OUTPATIENT)
Dept: GERIATRIC MEDICINE | Age: 84
End: 2019-07-25
Payer: MEDICAID

## 2019-07-25 DIAGNOSIS — Z79.01 CURRENT USE OF LONG TERM ANTICOAGULATION: Primary | ICD-10-CM

## 2019-07-25 DIAGNOSIS — E11.22 CKD STAGE 3 DUE TO TYPE 2 DIABETES MELLITUS (HCC): ICD-10-CM

## 2019-07-25 DIAGNOSIS — N18.30 CKD STAGE 3 DUE TO TYPE 2 DIABETES MELLITUS (HCC): ICD-10-CM

## 2019-07-25 PROCEDURE — 99308 SBSQ NF CARE LOW MDM 20: CPT | Performed by: PHYSICIAN ASSISTANT

## 2019-07-31 VITALS
SYSTOLIC BLOOD PRESSURE: 102 MMHG | DIASTOLIC BLOOD PRESSURE: 59 MMHG | TEMPERATURE: 97.7 F | HEART RATE: 60 BPM | WEIGHT: 150 LBS | BODY MASS INDEX: 36.17 KG/M2

## 2019-08-02 DIAGNOSIS — G89.29 OTHER CHRONIC PAIN: ICD-10-CM

## 2019-08-02 DIAGNOSIS — G54.7 PHANTOM LIMB SYNDROME (HCC): Primary | ICD-10-CM

## 2019-08-02 RX ORDER — OXYCODONE HYDROCHLORIDE AND ACETAMINOPHEN 5; 325 MG/1; MG/1
1 TABLET ORAL 2 TIMES DAILY
COMMUNITY
End: 2019-08-02 | Stop reason: SDUPTHER

## 2019-08-02 RX ORDER — OXYCODONE HYDROCHLORIDE AND ACETAMINOPHEN 5; 325 MG/1; MG/1
1 TABLET ORAL 2 TIMES DAILY
Qty: 60 TABLET | Refills: 0 | Status: SHIPPED | OUTPATIENT
Start: 2019-08-02 | End: 2019-09-01

## 2019-08-05 ENCOUNTER — OFFICE VISIT (OUTPATIENT)
Dept: GERIATRIC MEDICINE | Age: 84
End: 2019-08-05
Payer: MEDICARE

## 2019-08-05 DIAGNOSIS — N18.30 STAGE 3 CHRONIC KIDNEY DISEASE (HCC): ICD-10-CM

## 2019-08-05 DIAGNOSIS — K21.9 GASTROESOPHAGEAL REFLUX DISEASE WITHOUT ESOPHAGITIS: Primary | ICD-10-CM

## 2019-08-05 DIAGNOSIS — I25.10 CORONARY ARTERY DISEASE INVOLVING NATIVE CORONARY ARTERY OF NATIVE HEART WITHOUT ANGINA PECTORIS: ICD-10-CM

## 2019-08-05 PROCEDURE — 99308 SBSQ NF CARE LOW MDM 20: CPT | Performed by: PHYSICIAN ASSISTANT

## 2019-08-05 PROCEDURE — 1123F ACP DISCUSS/DSCN MKR DOCD: CPT | Performed by: PHYSICIAN ASSISTANT

## 2019-08-12 VITALS
OXYGEN SATURATION: 95 % | SYSTOLIC BLOOD PRESSURE: 130 MMHG | TEMPERATURE: 97.4 F | DIASTOLIC BLOOD PRESSURE: 71 MMHG | HEART RATE: 55 BPM | RESPIRATION RATE: 16 BRPM

## 2019-08-12 NOTE — PROGRESS NOTES
PATIENT: Ninfa Baltazar : 1926 DOS: 2019     [facility]    HISTORY OF PRESENT ILLNESS:  The patient being seen today for routine monthly follow up for history of GERD history of CAD without angina symptoms and CKD stage III. The patient denies any new GERD complaints. Denies any chest pain, shortness of breath, palpitations or POI. No new arm pains or neck pains at this time. Also denies any new symptoms of distal DVTs. Discussed ordering a BMP in a month to reassess. Most recent BMP showed to be fairly stable considering patient's ongoing renal condition. Otherwise, the patient has no new acute complaints. MEDICATIONS:  Reviewed. REVIEW OF SYSTEMS:  CONSTITUTIONAL:  No new nausea, vomiting, increased fatigue or weakness. The patient is afebrile. Denies any diaphoresis or acute weight changes. HEENT:  Denies any LOC, headache, confusion or head or neck trauma. No new syncope or numbness, tingling in lower limbs. The patient does have phantom pains. No new rhinorrhea, stuffiness, sneezing, sore throat, hoarseness, or allergy symptoms to complain of. CARDIAC:  No new chest pain, palpitations, orthopnea, or RODRIGUEZ. Denies any leg claudication at this time. She is a double amputee. PULMONARY:  No new cough, SOB, POI or wheezing. :  The patient denies any new urgency, discharge, polyuria, or dysuria. The patient does produce adequate urine. She has good intake of fluids. PSYCHIATRIC:  The patient's mood is overall stable. No new anxiety or dysthymia. Remaining 14-point review of systems unremarkable. PHYSICAL EXAMINATION:  VITAL SIGNS:  /71, respirations 16, pulse 55 bpm, temperature 97.4 degree F, pulse ox 95%. BG equals 168. GENERAL:  Good hygiene. Bright affect. Very pleasant and cooperative. No acute distress. Seen on her electric wheelchair today outside. HEENT:  No new scars, bumps or deformities to head or neck.   Pupils equal, round, and reactive to light. MMM without erythema or exudate. CARDIAC:  Regular rate and rhythm. No JVD. No lower leg edema. The patient has bilateral prosthetic legs. Intact distal pulses in lower extremities. PULMONARY:  LSCTA. ABDOMEN:  Normoactive bowel sounds, soft, nontender abdomen. MENTAL STATUS:  The patient is awake, alert, oriented 3/3. ASSESSMENT AND PLAN:  1. GERD - No new orders. We will maintain current therapy. 2.   History of CAD - without angina symptoms no new chest pain or associated symptoms. We will follow up. If new symptoms occur we will consult with cardiology for any new disorder. 3.   CKD stage III - will order BMP in 1 month to reassess renal function. We will follow up with patient with results.           Electronically Signed By: Kassie Yancey PA-C on 08/08/2019 10:50:50  ______________________________  LADONNA Parisi/[id number]  D: 08/07/2019 07:64:78  T: 08/08/2019 02:36:37    cc: - [place]

## 2019-08-22 ENCOUNTER — OFFICE VISIT (OUTPATIENT)
Dept: GERIATRIC MEDICINE | Age: 84
End: 2019-08-22
Payer: MEDICARE

## 2019-08-22 DIAGNOSIS — Z79.01 CURRENT USE OF LONG TERM ANTICOAGULATION: Primary | ICD-10-CM

## 2019-08-22 PROCEDURE — 99307 SBSQ NF CARE SF MDM 10: CPT | Performed by: PHYSICIAN ASSISTANT

## 2019-08-22 PROCEDURE — 1123F ACP DISCUSS/DSCN MKR DOCD: CPT | Performed by: PHYSICIAN ASSISTANT

## 2019-08-26 LAB
BUN BLDV-MCNC: 25 MG/DL
CALCIUM SERPL-MCNC: 9.2 MG/DL
CHLORIDE BLD-SCNC: 108 MMOL/L
CO2: 25 MMOL/L
CREAT SERPL-MCNC: 1.7 MG/DL
GFR CALCULATED: NORMAL
GLUCOSE BLD-MCNC: 104 MG/DL
POTASSIUM SERPL-SCNC: 4.3 MMOL/L
SODIUM BLD-SCNC: 141 MMOL/L

## 2019-08-30 ENCOUNTER — OFFICE VISIT (OUTPATIENT)
Dept: GERIATRIC MEDICINE | Age: 84
End: 2019-08-30
Payer: MEDICARE

## 2019-08-30 DIAGNOSIS — K59.01 SLOW TRANSIT CONSTIPATION: ICD-10-CM

## 2019-08-30 DIAGNOSIS — N18.30 STAGE 3 CHRONIC KIDNEY DISEASE (HCC): ICD-10-CM

## 2019-08-30 DIAGNOSIS — Z79.01 ANTICOAGULANT LONG-TERM USE: Primary | ICD-10-CM

## 2019-08-30 PROCEDURE — 99308 SBSQ NF CARE LOW MDM 20: CPT | Performed by: PHYSICIAN ASSISTANT

## 2019-08-30 PROCEDURE — 1123F ACP DISCUSS/DSCN MKR DOCD: CPT | Performed by: PHYSICIAN ASSISTANT

## 2019-09-03 DIAGNOSIS — G89.29 OTHER CHRONIC PAIN: Primary | ICD-10-CM

## 2019-09-03 LAB
BASOPHILS ABSOLUTE: 0.2 /ΜL
BASOPHILS RELATIVE PERCENT: 1.4 %
EOSINOPHILS ABSOLUTE: 0.6 /ΜL
EOSINOPHILS RELATIVE PERCENT: 5.2 %
HCT VFR BLD CALC: 32.1 % (ref 36–46)
HEMOGLOBIN: 10.2 G/DL (ref 12–16)
INR BLD: 1
LYMPHOCYTES ABSOLUTE: 2.6 /ΜL
LYMPHOCYTES RELATIVE PERCENT: 21.4 %
MCH RBC QN AUTO: 28.3 PG
MCHC RBC AUTO-ENTMCNC: 31.6 G/DL
MCV RBC AUTO: 89.5 FL
MONOCYTES ABSOLUTE: 1.2 /ΜL
MONOCYTES RELATIVE PERCENT: 9.7 %
NEUTROPHILS ABSOLUTE: 7.6 /ΜL
NEUTROPHILS RELATIVE PERCENT: 62.3 %
PLATELET # BLD: 257 K/ΜL
PMV BLD AUTO: 9.5 FL
PROTIME: 10.2 SECONDS
RBC # BLD: 3.59 10^6/ΜL
WBC # BLD: 12.2 10^3/ML

## 2019-09-03 RX ORDER — OXYCODONE HYDROCHLORIDE AND ACETAMINOPHEN 5; 325 MG/1; MG/1
1 TABLET ORAL 2 TIMES DAILY
COMMUNITY
End: 2019-09-03 | Stop reason: SDUPTHER

## 2019-09-04 RX ORDER — OXYCODONE HYDROCHLORIDE AND ACETAMINOPHEN 5; 325 MG/1; MG/1
1 TABLET ORAL 2 TIMES DAILY
Qty: 60 TABLET | Refills: 0 | Status: SHIPPED | OUTPATIENT
Start: 2019-09-04 | End: 2019-09-30 | Stop reason: SDUPTHER

## 2019-09-10 ENCOUNTER — OFFICE VISIT (OUTPATIENT)
Dept: GERIATRIC MEDICINE | Age: 84
End: 2019-09-10
Payer: MEDICARE

## 2019-09-10 DIAGNOSIS — M15.9 OSTEOARTHRITIS OF MULTIPLE JOINTS, UNSPECIFIED OSTEOARTHRITIS TYPE: ICD-10-CM

## 2019-09-10 DIAGNOSIS — I73.9 PVD (PERIPHERAL VASCULAR DISEASE) (HCC): Primary | ICD-10-CM

## 2019-09-10 DIAGNOSIS — I10 ESSENTIAL HYPERTENSION: ICD-10-CM

## 2019-09-10 DIAGNOSIS — E11.9 DIABETES MELLITUS WITHOUT COMPLICATION (HCC): ICD-10-CM

## 2019-09-10 PROCEDURE — 1123F ACP DISCUSS/DSCN MKR DOCD: CPT | Performed by: INTERNAL MEDICINE

## 2019-09-10 PROCEDURE — 99309 SBSQ NF CARE MODERATE MDM 30: CPT | Performed by: INTERNAL MEDICINE

## 2019-09-25 NOTE — PROGRESS NOTES
PATIENT: Ruthie Kyle : 1926 DOS: 09/10/2019     [facility]    Seen for a routine monthly visit for her diabetes, peripheral vascular disease, degenerative joint disease and hypertension. Medications are reviewed. SUBJECTIVE:  This very pleasant, 51-year-old woman was seated up in her motorized wheelchair. The patient has been tolerating her prosthetic devices, which she apparently uses cosmetically. The patient is mobile in the facility in her wheelchair. The patient has not had any new emesis, fevers or chills. Blood sugars have been largely in target. OBJECTIVE:  She appeared clinically stable. Pupils are small, but they are reactive. Extraocular movements are intact. Oral mucosa is moist.  No thrush. Neck was supple. Chest showed no crackles, no wheezing. Cardiovascular exam showed a regular rate. Abdomen was soft, nontender. Extremities showed bilateral amputations, prosthetics are in place. ASSESSMENT AND PLAN:  1. Peripheral vascular disease. The patient is at her baseline. No evidence of new end organ disease. 2.   Diabetes. No new symptomatic hypoglycemia. 3.   Hypertension. Blood pressure is stable. 4.   Degenerative joint disease. No new pain crisis. We will monitor.         Electronically Signed By: Fern Gonzalez M.D. on 2019 12:57:35  ______________________________  Fern Gonzalez M.D.  [id number]  D: 09/10/2019 17:04:04  T: 09/10/2019 23:09:48    cc: - [employer]

## 2019-09-30 DIAGNOSIS — G89.29 OTHER CHRONIC PAIN: ICD-10-CM

## 2019-10-01 RX ORDER — OXYCODONE HYDROCHLORIDE AND ACETAMINOPHEN 5; 325 MG/1; MG/1
1 TABLET ORAL 2 TIMES DAILY
Qty: 60 TABLET | Refills: 0 | Status: SHIPPED | OUTPATIENT
Start: 2019-10-01 | End: 2020-01-06 | Stop reason: SDUPTHER

## 2019-10-09 ENCOUNTER — OFFICE VISIT (OUTPATIENT)
Dept: GERIATRIC MEDICINE | Age: 84
End: 2019-10-09
Payer: MEDICARE

## 2019-10-09 DIAGNOSIS — E11.42 TYPE 2 DM WITH DIABETIC NEUROPATHY AFFECTING BOTH SIDES OF BODY (HCC): Primary | ICD-10-CM

## 2019-10-09 PROCEDURE — 99307 SBSQ NF CARE SF MDM 10: CPT | Performed by: PHYSICIAN ASSISTANT

## 2019-10-09 PROCEDURE — G8484 FLU IMMUNIZE NO ADMIN: HCPCS | Performed by: PHYSICIAN ASSISTANT

## 2019-10-09 PROCEDURE — 1123F ACP DISCUSS/DSCN MKR DOCD: CPT | Performed by: PHYSICIAN ASSISTANT

## 2019-10-16 ENCOUNTER — HOSPITAL ENCOUNTER (INPATIENT)
Age: 84
LOS: 7 days | Discharge: NURSING, REHAB OR LONG-TERM CARE | DRG: 872 | End: 2019-10-23
Attending: INTERNAL MEDICINE | Admitting: INTERNAL MEDICINE
Payer: MEDICARE

## 2019-10-16 ENCOUNTER — APPOINTMENT (OUTPATIENT)
Dept: ULTRASOUND IMAGING | Age: 84
DRG: 872 | End: 2019-10-16
Payer: MEDICARE

## 2019-10-16 ENCOUNTER — APPOINTMENT (OUTPATIENT)
Dept: GENERAL RADIOLOGY | Age: 84
DRG: 872 | End: 2019-10-16
Payer: MEDICARE

## 2019-10-16 DIAGNOSIS — D72.829 LEUKOCYTOSIS, UNSPECIFIED TYPE: ICD-10-CM

## 2019-10-16 DIAGNOSIS — J18.9 PNEUMONIA DUE TO ORGANISM: Primary | ICD-10-CM

## 2019-10-16 DIAGNOSIS — R53.1 GENERAL WEAKNESS: ICD-10-CM

## 2019-10-16 LAB
ALBUMIN SERPL-MCNC: 3.3 G/DL (ref 3.5–4.6)
ALP BLD-CCNC: 246 U/L (ref 40–130)
ALT SERPL-CCNC: 95 U/L (ref 0–33)
ANION GAP SERPL CALCULATED.3IONS-SCNC: 14 MEQ/L (ref 9–15)
ANISOCYTOSIS: ABNORMAL
AST SERPL-CCNC: 82 U/L (ref 0–35)
ATYPICAL LYMPHOCYTE RELATIVE PERCENT: 1 %
BACTERIA: ABNORMAL /HPF
BANDED NEUTROPHILS RELATIVE PERCENT: 2 % (ref 5–11)
BASOPHILS ABSOLUTE: 0.6 K/UL (ref 0–0.2)
BASOPHILS RELATIVE PERCENT: 2 %
BILIRUB SERPL-MCNC: 0.5 MG/DL (ref 0.2–0.7)
BILIRUBIN URINE: NEGATIVE
BLOOD, URINE: ABNORMAL
BUN BLDV-MCNC: 46 MG/DL (ref 8–23)
CALCIUM SERPL-MCNC: 8.8 MG/DL (ref 8.5–9.9)
CHLORIDE BLD-SCNC: 105 MEQ/L (ref 95–107)
CLARITY: ABNORMAL
CO2: 19 MEQ/L (ref 20–31)
COLOR: YELLOW
CREAT SERPL-MCNC: 2.73 MG/DL (ref 0.5–0.9)
EOSINOPHILS ABSOLUTE: 0.3 K/UL (ref 0–0.7)
EOSINOPHILS RELATIVE PERCENT: 1 %
EPITHELIAL CELLS, UA: ABNORMAL /HPF (ref 0–5)
GFR AFRICAN AMERICAN: 19.7
GFR NON-AFRICAN AMERICAN: 16.2
GLOBULIN: 3.6 G/DL (ref 2.3–3.5)
GLUCOSE BLD-MCNC: 124 MG/DL (ref 60–115)
GLUCOSE BLD-MCNC: 139 MG/DL (ref 60–115)
GLUCOSE BLD-MCNC: 154 MG/DL (ref 60–115)
GLUCOSE BLD-MCNC: 155 MG/DL (ref 70–99)
GLUCOSE BLD-MCNC: 164 MG/DL (ref 60–115)
GLUCOSE BLD-MCNC: 167 MG/DL (ref 60–115)
GLUCOSE BLD-MCNC: 179 MG/DL (ref 60–115)
GLUCOSE URINE: NEGATIVE MG/DL
HCT VFR BLD CALC: 31.2 % (ref 37–47)
HEMOGLOBIN: 10.1 G/DL (ref 12–16)
HYALINE CASTS: ABNORMAL /HPF (ref 0–5)
HYPOCHROMIA: 0
KETONES, URINE: NEGATIVE MG/DL
LACTIC ACID: 1.2 MMOL/L (ref 0.5–2.2)
LEUKOCYTE ESTERASE, URINE: ABNORMAL
LYMPHOCYTES ABSOLUTE: 3.2 K/UL (ref 1–4.8)
LYMPHOCYTES RELATIVE PERCENT: 10 %
MACROCYTES: 0
MCH RBC QN AUTO: 28.4 PG (ref 27–31.3)
MCHC RBC AUTO-ENTMCNC: 32.2 % (ref 33–37)
MCV RBC AUTO: 88.1 FL (ref 82–100)
MICROCYTES: 0
MONOCYTES ABSOLUTE: 1.2 K/UL (ref 0.2–0.8)
MONOCYTES RELATIVE PERCENT: 4.2 %
NEUTROPHILS ABSOLUTE: 24.2 K/UL (ref 1.4–6.5)
NEUTROPHILS RELATIVE PERCENT: 81 %
NITRITE, URINE: POSITIVE
PDW BLD-RTO: 15.1 % (ref 11.5–14.5)
PERFORMED ON: ABNORMAL
PH UA: 5.5 (ref 5–9)
PLATELET # BLD: 256 K/UL (ref 130–400)
PLATELET SLIDE REVIEW: NORMAL
POIKILOCYTES: 0
POLYCHROMASIA: 0
POTASSIUM SERPL-SCNC: 5.2 MEQ/L (ref 3.4–4.9)
PROCALCITONIN: 56.61 NG/ML (ref 0–0.15)
PROTEIN UA: 100 MG/DL
RBC # BLD: 3.54 M/UL (ref 4.2–5.4)
RBC UA: ABNORMAL /HPF (ref 0–5)
SODIUM BLD-SCNC: 138 MEQ/L (ref 135–144)
SPECIFIC GRAVITY UA: 1.02 (ref 1–1.03)
TOTAL PROTEIN: 6.9 G/DL (ref 6.3–8)
UROBILINOGEN, URINE: 1 E.U./DL
WBC # BLD: 29.2 K/UL (ref 4.8–10.8)
WBC UA: >100 /HPF (ref 0–5)

## 2019-10-16 PROCEDURE — 2580000003 HC RX 258: Performed by: INTERNAL MEDICINE

## 2019-10-16 PROCEDURE — 87086 URINE CULTURE/COLONY COUNT: CPT

## 2019-10-16 PROCEDURE — 87186 SC STD MICRODIL/AGAR DIL: CPT

## 2019-10-16 PROCEDURE — 36415 COLL VENOUS BLD VENIPUNCTURE: CPT

## 2019-10-16 PROCEDURE — 6360000002 HC RX W HCPCS: Performed by: NURSE PRACTITIONER

## 2019-10-16 PROCEDURE — 81001 URINALYSIS AUTO W/SCOPE: CPT

## 2019-10-16 PROCEDURE — 76705 ECHO EXAM OF ABDOMEN: CPT

## 2019-10-16 PROCEDURE — 87077 CULTURE AEROBIC IDENTIFY: CPT

## 2019-10-16 PROCEDURE — 6370000000 HC RX 637 (ALT 250 FOR IP): Performed by: INTERNAL MEDICINE

## 2019-10-16 PROCEDURE — 6360000002 HC RX W HCPCS: Performed by: INTERNAL MEDICINE

## 2019-10-16 PROCEDURE — 84145 PROCALCITONIN (PCT): CPT

## 2019-10-16 PROCEDURE — 6370000000 HC RX 637 (ALT 250 FOR IP): Performed by: NURSE PRACTITIONER

## 2019-10-16 PROCEDURE — 71045 X-RAY EXAM CHEST 1 VIEW: CPT

## 2019-10-16 PROCEDURE — 85025 COMPLETE CBC W/AUTO DIFF WBC: CPT

## 2019-10-16 PROCEDURE — 80053 COMPREHEN METABOLIC PANEL: CPT

## 2019-10-16 PROCEDURE — 99285 EMERGENCY DEPT VISIT HI MDM: CPT

## 2019-10-16 PROCEDURE — 87040 BLOOD CULTURE FOR BACTERIA: CPT

## 2019-10-16 PROCEDURE — 87149 DNA/RNA DIRECT PROBE: CPT

## 2019-10-16 PROCEDURE — 2500000003 HC RX 250 WO HCPCS: Performed by: INTERNAL MEDICINE

## 2019-10-16 PROCEDURE — 1210000000 HC MED SURG R&B

## 2019-10-16 PROCEDURE — 83605 ASSAY OF LACTIC ACID: CPT

## 2019-10-16 RX ORDER — PANTOPRAZOLE SODIUM 40 MG/1
40 TABLET, DELAYED RELEASE ORAL
Status: DISCONTINUED | OUTPATIENT
Start: 2019-10-17 | End: 2019-10-23 | Stop reason: HOSPADM

## 2019-10-16 RX ORDER — AMLODIPINE BESYLATE 5 MG/1
5 TABLET ORAL DAILY
Status: DISCONTINUED | OUTPATIENT
Start: 2019-10-16 | End: 2019-10-23 | Stop reason: HOSPADM

## 2019-10-16 RX ORDER — INSULIN GLARGINE 100 [IU]/ML
40 INJECTION, SOLUTION SUBCUTANEOUS NIGHTLY
Status: DISCONTINUED | OUTPATIENT
Start: 2019-10-16 | End: 2019-10-23 | Stop reason: HOSPADM

## 2019-10-16 RX ORDER — SODIUM CHLORIDE 0.9 % (FLUSH) 0.9 %
10 SYRINGE (ML) INJECTION EVERY 12 HOURS SCHEDULED
Status: DISCONTINUED | OUTPATIENT
Start: 2019-10-16 | End: 2019-10-23 | Stop reason: HOSPADM

## 2019-10-16 RX ORDER — APIXABAN 2.5 MG/1
2.5 TABLET, FILM COATED ORAL 2 TIMES DAILY
COMMUNITY
Start: 2019-08-20

## 2019-10-16 RX ORDER — POTASSIUM CHLORIDE 20 MEQ/1
40 TABLET, EXTENDED RELEASE ORAL PRN
Status: DISCONTINUED | OUTPATIENT
Start: 2019-10-16 | End: 2019-10-23 | Stop reason: HOSPADM

## 2019-10-16 RX ORDER — ASPIRIN 81 MG/1
81 TABLET, CHEWABLE ORAL DAILY
Status: DISCONTINUED | OUTPATIENT
Start: 2019-10-16 | End: 2019-10-23 | Stop reason: HOSPADM

## 2019-10-16 RX ORDER — LEVOFLOXACIN 5 MG/ML
500 INJECTION, SOLUTION INTRAVENOUS
Status: DISCONTINUED | OUTPATIENT
Start: 2019-10-18 | End: 2019-10-17

## 2019-10-16 RX ORDER — HYDRALAZINE HYDROCHLORIDE 20 MG/ML
5 INJECTION INTRAMUSCULAR; INTRAVENOUS ONCE
Status: COMPLETED | OUTPATIENT
Start: 2019-10-17 | End: 2019-10-17

## 2019-10-16 RX ORDER — POTASSIUM CHLORIDE 7.45 MG/ML
10 INJECTION INTRAVENOUS PRN
Status: DISCONTINUED | OUTPATIENT
Start: 2019-10-16 | End: 2019-10-23 | Stop reason: HOSPADM

## 2019-10-16 RX ORDER — GABAPENTIN 100 MG/1
200 CAPSULE ORAL NIGHTLY
Status: DISCONTINUED | OUTPATIENT
Start: 2019-10-16 | End: 2019-10-23 | Stop reason: HOSPADM

## 2019-10-16 RX ORDER — DEXTROSE MONOHYDRATE 50 MG/ML
100 INJECTION, SOLUTION INTRAVENOUS PRN
Status: DISCONTINUED | OUTPATIENT
Start: 2019-10-16 | End: 2019-10-23 | Stop reason: HOSPADM

## 2019-10-16 RX ORDER — GABAPENTIN 100 MG/1
100 CAPSULE ORAL DAILY
Status: DISCONTINUED | OUTPATIENT
Start: 2019-10-16 | End: 2019-10-23 | Stop reason: HOSPADM

## 2019-10-16 RX ORDER — LEVOFLOXACIN 5 MG/ML
750 INJECTION, SOLUTION INTRAVENOUS ONCE
Status: COMPLETED | OUTPATIENT
Start: 2019-10-16 | End: 2019-10-16

## 2019-10-16 RX ORDER — DEXTROSE MONOHYDRATE 25 G/50ML
12.5 INJECTION, SOLUTION INTRAVENOUS PRN
Status: DISCONTINUED | OUTPATIENT
Start: 2019-10-16 | End: 2019-10-23 | Stop reason: HOSPADM

## 2019-10-16 RX ORDER — PROCHLORPERAZINE EDISYLATE 5 MG/ML
10 INJECTION INTRAMUSCULAR; INTRAVENOUS ONCE
Status: COMPLETED | OUTPATIENT
Start: 2019-10-17 | End: 2019-10-17

## 2019-10-16 RX ORDER — ATORVASTATIN CALCIUM 40 MG/1
40 TABLET, FILM COATED ORAL DAILY
Status: DISCONTINUED | OUTPATIENT
Start: 2019-10-16 | End: 2019-10-23 | Stop reason: HOSPADM

## 2019-10-16 RX ORDER — ACETAMINOPHEN 325 MG/1
650 TABLET ORAL ONCE
Status: COMPLETED | OUTPATIENT
Start: 2019-10-16 | End: 2019-10-16

## 2019-10-16 RX ORDER — ANALGESIC BALM 1.74; 4.06 G/29G; G/29G
OINTMENT TOPICAL PRN
Status: DISCONTINUED | OUTPATIENT
Start: 2019-10-16 | End: 2019-10-23 | Stop reason: HOSPADM

## 2019-10-16 RX ORDER — LETROZOLE 2.5 MG/1
2.5 TABLET, FILM COATED ORAL DAILY
Status: DISCONTINUED | OUTPATIENT
Start: 2019-10-16 | End: 2019-10-23 | Stop reason: HOSPADM

## 2019-10-16 RX ORDER — SODIUM CHLORIDE 0.9 % (FLUSH) 0.9 %
10 SYRINGE (ML) INJECTION PRN
Status: DISCONTINUED | OUTPATIENT
Start: 2019-10-16 | End: 2019-10-23 | Stop reason: HOSPADM

## 2019-10-16 RX ORDER — ONDANSETRON 2 MG/ML
4 INJECTION INTRAMUSCULAR; INTRAVENOUS EVERY 6 HOURS PRN
Status: DISCONTINUED | OUTPATIENT
Start: 2019-10-16 | End: 2019-10-23 | Stop reason: HOSPADM

## 2019-10-16 RX ORDER — OXYCODONE HYDROCHLORIDE AND ACETAMINOPHEN 5; 325 MG/1; MG/1
1 TABLET ORAL EVERY 8 HOURS PRN
Status: DISCONTINUED | OUTPATIENT
Start: 2019-10-16 | End: 2019-10-23 | Stop reason: HOSPADM

## 2019-10-16 RX ORDER — NICOTINE POLACRILEX 4 MG
15 LOZENGE BUCCAL PRN
Status: DISCONTINUED | OUTPATIENT
Start: 2019-10-16 | End: 2019-10-23 | Stop reason: HOSPADM

## 2019-10-16 RX ORDER — OMEPRAZOLE 20 MG/1
20 CAPSULE, DELAYED RELEASE ORAL DAILY
Status: DISCONTINUED | OUTPATIENT
Start: 2019-10-16 | End: 2019-10-16 | Stop reason: CLARIF

## 2019-10-16 RX ORDER — LEVOTHYROXINE SODIUM 0.12 MG/1
125 TABLET ORAL DAILY
Status: DISCONTINUED | OUTPATIENT
Start: 2019-10-16 | End: 2019-10-23 | Stop reason: HOSPADM

## 2019-10-16 RX ORDER — ACETAMINOPHEN 325 MG/1
650 TABLET ORAL EVERY 4 HOURS PRN
Status: DISCONTINUED | OUTPATIENT
Start: 2019-10-16 | End: 2019-10-23 | Stop reason: HOSPADM

## 2019-10-16 RX ADMIN — ACETAMINOPHEN 650 MG: 325 TABLET ORAL at 17:59

## 2019-10-16 RX ADMIN — AMLODIPINE BESYLATE 5 MG: 5 TABLET ORAL at 12:30

## 2019-10-16 RX ADMIN — ONDANSETRON 4 MG: 2 INJECTION INTRAMUSCULAR; INTRAVENOUS at 19:37

## 2019-10-16 RX ADMIN — ENOXAPARIN SODIUM 30 MG: 30 INJECTION SUBCUTANEOUS at 10:29

## 2019-10-16 RX ADMIN — OXYCODONE HYDROCHLORIDE AND ACETAMINOPHEN 1 TABLET: 5; 325 TABLET ORAL at 13:45

## 2019-10-16 RX ADMIN — LEVOTHYROXINE SODIUM 125 MCG: 125 TABLET ORAL at 12:29

## 2019-10-16 RX ADMIN — ASPIRIN 81 MG 81 MG: 81 TABLET ORAL at 12:31

## 2019-10-16 RX ADMIN — LEVOFLOXACIN 750 MG: 5 INJECTION, SOLUTION INTRAVENOUS at 05:00

## 2019-10-16 RX ADMIN — ACETAMINOPHEN 650 MG: 325 TABLET ORAL at 05:09

## 2019-10-16 RX ADMIN — VITAMIN D, TAB 1000IU (100/BT) 2000 UNITS: 25 TAB at 12:30

## 2019-10-16 RX ADMIN — METOPROLOL TARTRATE 25 MG: 25 TABLET ORAL at 12:31

## 2019-10-16 RX ADMIN — METRONIDAZOLE 500 MG: 500 INJECTION, SOLUTION INTRAVENOUS at 20:44

## 2019-10-16 RX ADMIN — APIXABAN 2.5 MG: 2.5 TABLET, FILM COATED ORAL at 12:30

## 2019-10-16 RX ADMIN — ACETAMINOPHEN 650 MG: 325 TABLET ORAL at 10:38

## 2019-10-16 RX ADMIN — INSULIN LISPRO 2 UNITS: 100 INJECTION, SOLUTION INTRAVENOUS; SUBCUTANEOUS at 12:33

## 2019-10-16 RX ADMIN — LETROZOLE 2.5 MG: 2.5 TABLET ORAL at 13:45

## 2019-10-16 RX ADMIN — Medication 10 ML: at 20:45

## 2019-10-16 RX ADMIN — INSULIN GLARGINE 40 UNITS: 100 INJECTION, SOLUTION SUBCUTANEOUS at 22:30

## 2019-10-16 RX ADMIN — GABAPENTIN 100 MG: 100 CAPSULE ORAL at 12:31

## 2019-10-16 RX ADMIN — METRONIDAZOLE 500 MG: 500 INJECTION, SOLUTION INTRAVENOUS at 12:23

## 2019-10-16 ASSESSMENT — ENCOUNTER SYMPTOMS
RHINORRHEA: 0
WHEEZING: 0
VOMITING: 0
COUGH: 0
SHORTNESS OF BREATH: 0
TROUBLE SWALLOWING: 0
COLOR CHANGE: 0
BACK PAIN: 0
DIARRHEA: 0
CHEST TIGHTNESS: 0
ABDOMINAL PAIN: 0
SORE THROAT: 0
NAUSEA: 0

## 2019-10-16 ASSESSMENT — PAIN SCALES - GENERAL
PAINLEVEL_OUTOF10: 8
PAINLEVEL_OUTOF10: 5
PAINLEVEL_OUTOF10: 9
PAINLEVEL_OUTOF10: 2

## 2019-10-17 ENCOUNTER — ANESTHESIA EVENT (OUTPATIENT)
Dept: OPERATING ROOM | Age: 84
DRG: 872 | End: 2019-10-17
Payer: MEDICARE

## 2019-10-17 ENCOUNTER — ANESTHESIA (OUTPATIENT)
Dept: OPERATING ROOM | Age: 84
DRG: 872 | End: 2019-10-17
Payer: MEDICARE

## 2019-10-17 LAB
ALBUMIN SERPL-MCNC: 2.8 G/DL (ref 3.5–4.6)
ALBUMIN SERPL-MCNC: 3 G/DL (ref 3.5–4.6)
ALP BLD-CCNC: 377 U/L (ref 40–130)
ALP BLD-CCNC: 404 U/L (ref 40–130)
ALT SERPL-CCNC: 127 U/L (ref 0–33)
ALT SERPL-CCNC: 132 U/L (ref 0–33)
ANION GAP SERPL CALCULATED.3IONS-SCNC: 16 MEQ/L (ref 9–15)
ANION GAP SERPL CALCULATED.3IONS-SCNC: 20 MEQ/L (ref 9–15)
AST SERPL-CCNC: 121 U/L (ref 0–35)
AST SERPL-CCNC: 138 U/L (ref 0–35)
BILIRUB SERPL-MCNC: 0.5 MG/DL (ref 0.2–0.7)
BILIRUB SERPL-MCNC: 0.6 MG/DL (ref 0.2–0.7)
BILIRUBIN DIRECT: 0.3 MG/DL (ref 0–0.4)
BILIRUBIN DIRECT: 0.3 MG/DL (ref 0–0.4)
BILIRUBIN, INDIRECT: 0.2 MG/DL (ref 0–0.6)
BILIRUBIN, INDIRECT: 0.3 MG/DL (ref 0–0.6)
BUN BLDV-MCNC: 45 MG/DL (ref 8–23)
BUN BLDV-MCNC: 46 MG/DL (ref 8–23)
CALCIUM SERPL-MCNC: 8.8 MG/DL (ref 8.5–9.9)
CALCIUM SERPL-MCNC: 9.2 MG/DL (ref 8.5–9.9)
CHLORIDE BLD-SCNC: 102 MEQ/L (ref 95–107)
CHLORIDE BLD-SCNC: 103 MEQ/L (ref 95–107)
CO2: 12 MEQ/L (ref 20–31)
CO2: 15 MEQ/L (ref 20–31)
CREAT SERPL-MCNC: 2.51 MG/DL (ref 0.5–0.9)
CREAT SERPL-MCNC: 2.62 MG/DL (ref 0.5–0.9)
GFR AFRICAN AMERICAN: 20.6
GFR AFRICAN AMERICAN: 21.7
GFR NON-AFRICAN AMERICAN: 17
GFR NON-AFRICAN AMERICAN: 17.9
GLUCOSE BLD-MCNC: 125 MG/DL (ref 70–99)
GLUCOSE BLD-MCNC: 135 MG/DL (ref 60–115)
GLUCOSE BLD-MCNC: 193 MG/DL (ref 60–115)
GLUCOSE BLD-MCNC: 197 MG/DL (ref 60–115)
GLUCOSE BLD-MCNC: 207 MG/DL (ref 70–99)
GLUCOSE BLD-MCNC: 215 MG/DL (ref 60–115)
HCT VFR BLD CALC: 28.9 % (ref 37–47)
HEMOGLOBIN: 9.3 G/DL (ref 12–16)
MCH RBC QN AUTO: 28.3 PG (ref 27–31.3)
MCHC RBC AUTO-ENTMCNC: 32.3 % (ref 33–37)
MCV RBC AUTO: 87.8 FL (ref 82–100)
PDW BLD-RTO: 15.4 % (ref 11.5–14.5)
PERFORMED ON: ABNORMAL
PLATELET # BLD: 226 K/UL (ref 130–400)
POTASSIUM REFLEX MAGNESIUM: 4.9 MEQ/L (ref 3.4–4.9)
POTASSIUM SERPL-SCNC: 4.7 MEQ/L (ref 3.4–4.9)
PROCALCITONIN: 23.67 NG/ML (ref 0–0.15)
RBC # BLD: 3.29 M/UL (ref 4.2–5.4)
SODIUM BLD-SCNC: 134 MEQ/L (ref 135–144)
SODIUM BLD-SCNC: 134 MEQ/L (ref 135–144)
TOTAL PROTEIN: 6.5 G/DL (ref 6.3–8)
TOTAL PROTEIN: 7.4 G/DL (ref 6.3–8)
WBC # BLD: 17.3 K/UL (ref 4.8–10.8)

## 2019-10-17 PROCEDURE — 99222 1ST HOSP IP/OBS MODERATE 55: CPT | Performed by: INTERNAL MEDICINE

## 2019-10-17 PROCEDURE — 36415 COLL VENOUS BLD VENIPUNCTURE: CPT

## 2019-10-17 PROCEDURE — 99222 1ST HOSP IP/OBS MODERATE 55: CPT | Performed by: SURGERY

## 2019-10-17 PROCEDURE — 85027 COMPLETE CBC AUTOMATED: CPT

## 2019-10-17 PROCEDURE — 6360000002 HC RX W HCPCS: Performed by: NURSE PRACTITIONER

## 2019-10-17 PROCEDURE — 2580000003 HC RX 258

## 2019-10-17 PROCEDURE — 2580000003 HC RX 258: Performed by: INTERNAL MEDICINE

## 2019-10-17 PROCEDURE — 6370000000 HC RX 637 (ALT 250 FOR IP): Performed by: INTERNAL MEDICINE

## 2019-10-17 PROCEDURE — 84145 PROCALCITONIN (PCT): CPT

## 2019-10-17 PROCEDURE — 80048 BASIC METABOLIC PNL TOTAL CA: CPT

## 2019-10-17 PROCEDURE — 80076 HEPATIC FUNCTION PANEL: CPT

## 2019-10-17 PROCEDURE — 2500000003 HC RX 250 WO HCPCS: Performed by: INTERNAL MEDICINE

## 2019-10-17 PROCEDURE — 1210000000 HC MED SURG R&B

## 2019-10-17 PROCEDURE — 6360000002 HC RX W HCPCS: Performed by: INTERNAL MEDICINE

## 2019-10-17 RX ORDER — SODIUM CHLORIDE 9 MG/ML
INJECTION, SOLUTION INTRAVENOUS CONTINUOUS
Status: DISCONTINUED | OUTPATIENT
Start: 2019-10-17 | End: 2019-10-23 | Stop reason: HOSPADM

## 2019-10-17 RX ORDER — MAGNESIUM HYDROXIDE 1200 MG/15ML
LIQUID ORAL PRN
Status: DISCONTINUED | OUTPATIENT
Start: 2019-10-17 | End: 2019-10-17 | Stop reason: HOSPADM

## 2019-10-17 RX ORDER — SODIUM CHLORIDE 9 MG/ML
INJECTION, SOLUTION INTRAVENOUS
Status: COMPLETED
Start: 2019-10-17 | End: 2019-10-17

## 2019-10-17 RX ORDER — ONDANSETRON 2 MG/ML
4 INJECTION INTRAMUSCULAR; INTRAVENOUS
Status: DISCONTINUED | OUTPATIENT
Start: 2019-10-17 | End: 2019-10-17 | Stop reason: HOSPADM

## 2019-10-17 RX ORDER — 0.9 % SODIUM CHLORIDE 0.9 %
1000 INTRAVENOUS SOLUTION INTRAVENOUS ONCE
Status: COMPLETED | OUTPATIENT
Start: 2019-10-17 | End: 2019-10-17

## 2019-10-17 RX ADMIN — CEFEPIME HYDROCHLORIDE 2 G: 2 INJECTION, POWDER, FOR SOLUTION INTRAVENOUS at 08:11

## 2019-10-17 RX ADMIN — APIXABAN 2.5 MG: 2.5 TABLET, FILM COATED ORAL at 20:28

## 2019-10-17 RX ADMIN — PANTOPRAZOLE SODIUM 40 MG: 40 TABLET, DELAYED RELEASE ORAL at 08:14

## 2019-10-17 RX ADMIN — HYDRALAZINE HYDROCHLORIDE 5 MG: 20 INJECTION INTRAMUSCULAR; INTRAVENOUS at 00:13

## 2019-10-17 RX ADMIN — GABAPENTIN 100 MG: 100 CAPSULE ORAL at 13:55

## 2019-10-17 RX ADMIN — Medication 1000 ML: at 11:35

## 2019-10-17 RX ADMIN — METOPROLOL TARTRATE 25 MG: 25 TABLET ORAL at 13:56

## 2019-10-17 RX ADMIN — AMLODIPINE BESYLATE 5 MG: 5 TABLET ORAL at 13:56

## 2019-10-17 RX ADMIN — METRONIDAZOLE 500 MG: 500 INJECTION, SOLUTION INTRAVENOUS at 04:19

## 2019-10-17 RX ADMIN — INSULIN LISPRO 4 UNITS: 100 INJECTION, SOLUTION INTRAVENOUS; SUBCUTANEOUS at 16:34

## 2019-10-17 RX ADMIN — ATORVASTATIN CALCIUM 40 MG: 40 TABLET, FILM COATED ORAL at 13:55

## 2019-10-17 RX ADMIN — Medication 10 ML: at 20:39

## 2019-10-17 RX ADMIN — ACETAMINOPHEN 650 MG: 325 TABLET ORAL at 01:13

## 2019-10-17 RX ADMIN — OXYCODONE HYDROCHLORIDE AND ACETAMINOPHEN 1 TABLET: 5; 325 TABLET ORAL at 16:19

## 2019-10-17 RX ADMIN — GABAPENTIN 200 MG: 100 CAPSULE ORAL at 20:29

## 2019-10-17 RX ADMIN — VITAMIN D, TAB 1000IU (100/BT) 2000 UNITS: 25 TAB at 13:57

## 2019-10-17 RX ADMIN — INSULIN GLARGINE 40 UNITS: 100 INJECTION, SOLUTION SUBCUTANEOUS at 21:51

## 2019-10-17 RX ADMIN — ONDANSETRON 4 MG: 2 INJECTION INTRAMUSCULAR; INTRAVENOUS at 14:26

## 2019-10-17 RX ADMIN — SODIUM CHLORIDE 1000 ML: 9 INJECTION, SOLUTION INTRAVENOUS at 11:35

## 2019-10-17 RX ADMIN — LEVOTHYROXINE SODIUM 125 MCG: 125 TABLET ORAL at 08:14

## 2019-10-17 RX ADMIN — METOPROLOL TARTRATE 25 MG: 25 TABLET ORAL at 20:28

## 2019-10-17 RX ADMIN — PROCHLORPERAZINE EDISYLATE 10 MG: 5 INJECTION INTRAMUSCULAR; INTRAVENOUS at 00:13

## 2019-10-17 RX ADMIN — SODIUM CHLORIDE: 9 INJECTION, SOLUTION INTRAVENOUS at 07:38

## 2019-10-17 RX ADMIN — ONDANSETRON 4 MG: 2 INJECTION INTRAMUSCULAR; INTRAVENOUS at 08:11

## 2019-10-17 RX ADMIN — SODIUM CHLORIDE: 9 INJECTION, SOLUTION INTRAVENOUS at 20:31

## 2019-10-17 RX ADMIN — METOPROLOL TARTRATE 25 MG: 25 TABLET ORAL at 10:07

## 2019-10-17 RX ADMIN — LETROZOLE 2.5 MG: 2.5 TABLET ORAL at 14:26

## 2019-10-17 ASSESSMENT — ENCOUNTER SYMPTOMS
RESPIRATORY NEGATIVE: 1
GASTROINTESTINAL NEGATIVE: 1

## 2019-10-17 ASSESSMENT — PAIN SCALES - GENERAL
PAINLEVEL_OUTOF10: 2
PAINLEVEL_OUTOF10: 10

## 2019-10-18 LAB
ALBUMIN SERPL-MCNC: 2.6 G/DL (ref 3.5–4.6)
ALP BLD-CCNC: 306 U/L (ref 40–130)
ALT SERPL-CCNC: 83 U/L (ref 0–33)
ANION GAP SERPL CALCULATED.3IONS-SCNC: 13 MEQ/L (ref 9–15)
AST SERPL-CCNC: 68 U/L (ref 0–35)
BASOPHILS ABSOLUTE: 0.1 K/UL (ref 0–0.2)
BASOPHILS RELATIVE PERCENT: 1 %
BILIRUB SERPL-MCNC: 0.3 MG/DL (ref 0.2–0.7)
BILIRUBIN DIRECT: <0.2 MG/DL (ref 0–0.4)
BILIRUBIN, INDIRECT: ABNORMAL MG/DL (ref 0–0.6)
BLOOD CULTURE, ROUTINE: ABNORMAL
BLOOD CULTURE, ROUTINE: ABNORMAL
BUN BLDV-MCNC: 37 MG/DL (ref 8–23)
CALCIUM SERPL-MCNC: 8.5 MG/DL (ref 8.5–9.9)
CHLORIDE BLD-SCNC: 111 MEQ/L (ref 95–107)
CO2: 16 MEQ/L (ref 20–31)
CREAT SERPL-MCNC: 2.19 MG/DL (ref 0.5–0.9)
CULTURE, BLOOD 2: ABNORMAL
EOSINOPHILS ABSOLUTE: 0.3 K/UL (ref 0–0.7)
EOSINOPHILS RELATIVE PERCENT: 2.2 %
GFR AFRICAN AMERICAN: 25.3
GFR NON-AFRICAN AMERICAN: 20.9
GLUCOSE BLD-MCNC: 107 MG/DL (ref 60–115)
GLUCOSE BLD-MCNC: 188 MG/DL (ref 60–115)
GLUCOSE BLD-MCNC: 83 MG/DL (ref 70–99)
GLUCOSE BLD-MCNC: 84 MG/DL (ref 60–115)
GLUCOSE BLD-MCNC: 90 MG/DL (ref 60–115)
GLUCOSE BLD-MCNC: 92 MG/DL (ref 60–115)
HCT VFR BLD CALC: 27 % (ref 37–47)
HEMOGLOBIN: 8.6 G/DL (ref 12–16)
LYMPHOCYTES ABSOLUTE: 1.1 K/UL (ref 1–4.8)
LYMPHOCYTES RELATIVE PERCENT: 9.3 %
MCH RBC QN AUTO: 28.4 PG (ref 27–31.3)
MCHC RBC AUTO-ENTMCNC: 31.8 % (ref 33–37)
MCV RBC AUTO: 89.3 FL (ref 82–100)
MONOCYTES ABSOLUTE: 1.5 K/UL (ref 0.2–0.8)
MONOCYTES RELATIVE PERCENT: 12.5 %
NEUTROPHILS ABSOLUTE: 9 K/UL (ref 1.4–6.5)
NEUTROPHILS RELATIVE PERCENT: 75 %
ORGANISM: ABNORMAL
PDW BLD-RTO: 15.3 % (ref 11.5–14.5)
PERFORMED ON: ABNORMAL
PERFORMED ON: NORMAL
PLATELET # BLD: 210 K/UL (ref 130–400)
POTASSIUM REFLEX MAGNESIUM: 4.2 MEQ/L (ref 3.4–4.9)
RBC # BLD: 3.02 M/UL (ref 4.2–5.4)
SODIUM BLD-SCNC: 140 MEQ/L (ref 135–144)
TOTAL PROTEIN: 6.2 G/DL (ref 6.3–8)
URINE CULTURE, ROUTINE: ABNORMAL
WBC # BLD: 12 K/UL (ref 4.8–10.8)

## 2019-10-18 PROCEDURE — 80076 HEPATIC FUNCTION PANEL: CPT

## 2019-10-18 PROCEDURE — 1210000000 HC MED SURG R&B

## 2019-10-18 PROCEDURE — 36415 COLL VENOUS BLD VENIPUNCTURE: CPT

## 2019-10-18 PROCEDURE — 80048 BASIC METABOLIC PNL TOTAL CA: CPT

## 2019-10-18 PROCEDURE — 85025 COMPLETE CBC W/AUTO DIFF WBC: CPT

## 2019-10-18 PROCEDURE — 6360000002 HC RX W HCPCS: Performed by: INTERNAL MEDICINE

## 2019-10-18 PROCEDURE — 99232 SBSQ HOSP IP/OBS MODERATE 35: CPT | Performed by: SURGERY

## 2019-10-18 PROCEDURE — 6370000000 HC RX 637 (ALT 250 FOR IP): Performed by: INTERNAL MEDICINE

## 2019-10-18 PROCEDURE — 99232 SBSQ HOSP IP/OBS MODERATE 35: CPT | Performed by: INTERNAL MEDICINE

## 2019-10-18 PROCEDURE — 2580000003 HC RX 258: Performed by: INTERNAL MEDICINE

## 2019-10-18 RX ADMIN — VITAMIN D, TAB 1000IU (100/BT) 2000 UNITS: 25 TAB at 08:58

## 2019-10-18 RX ADMIN — APIXABAN 2.5 MG: 2.5 TABLET, FILM COATED ORAL at 09:07

## 2019-10-18 RX ADMIN — ATORVASTATIN CALCIUM 40 MG: 40 TABLET, FILM COATED ORAL at 08:57

## 2019-10-18 RX ADMIN — OXYCODONE HYDROCHLORIDE AND ACETAMINOPHEN 1 TABLET: 5; 325 TABLET ORAL at 08:58

## 2019-10-18 RX ADMIN — CEFEPIME HYDROCHLORIDE 2 G: 2 INJECTION, POWDER, FOR SOLUTION INTRAVENOUS at 08:58

## 2019-10-18 RX ADMIN — METOPROLOL TARTRATE 25 MG: 25 TABLET ORAL at 08:57

## 2019-10-18 RX ADMIN — GABAPENTIN 200 MG: 100 CAPSULE ORAL at 20:57

## 2019-10-18 RX ADMIN — INSULIN LISPRO 2 UNITS: 100 INJECTION, SOLUTION INTRAVENOUS; SUBCUTANEOUS at 12:35

## 2019-10-18 RX ADMIN — ASPIRIN 81 MG 81 MG: 81 TABLET ORAL at 08:57

## 2019-10-18 RX ADMIN — LEVOTHYROXINE SODIUM 125 MCG: 125 TABLET ORAL at 06:45

## 2019-10-18 RX ADMIN — APIXABAN 2.5 MG: 2.5 TABLET, FILM COATED ORAL at 20:57

## 2019-10-18 RX ADMIN — PANTOPRAZOLE SODIUM 40 MG: 40 TABLET, DELAYED RELEASE ORAL at 06:45

## 2019-10-18 RX ADMIN — LETROZOLE 2.5 MG: 2.5 TABLET ORAL at 14:10

## 2019-10-18 RX ADMIN — GABAPENTIN 100 MG: 100 CAPSULE ORAL at 08:58

## 2019-10-18 RX ADMIN — METOPROLOL TARTRATE 25 MG: 25 TABLET ORAL at 20:57

## 2019-10-18 RX ADMIN — Medication 10 ML: at 20:57

## 2019-10-18 RX ADMIN — CEFTRIAXONE SODIUM 1 G: 1 INJECTION, POWDER, FOR SOLUTION INTRAMUSCULAR; INTRAVENOUS at 17:56

## 2019-10-18 RX ADMIN — OXYCODONE HYDROCHLORIDE AND ACETAMINOPHEN 1 TABLET: 5; 325 TABLET ORAL at 00:40

## 2019-10-18 RX ADMIN — OXYCODONE HYDROCHLORIDE AND ACETAMINOPHEN 1 TABLET: 5; 325 TABLET ORAL at 20:57

## 2019-10-18 RX ADMIN — AMLODIPINE BESYLATE 5 MG: 5 TABLET ORAL at 08:57

## 2019-10-18 ASSESSMENT — PAIN SCALES - GENERAL
PAINLEVEL_OUTOF10: 7
PAINLEVEL_OUTOF10: 7
PAINLEVEL_OUTOF10: 10

## 2019-10-18 ASSESSMENT — PAIN DESCRIPTION - FREQUENCY: FREQUENCY: CONTINUOUS

## 2019-10-18 ASSESSMENT — PAIN DESCRIPTION - PROGRESSION: CLINICAL_PROGRESSION: NOT CHANGED

## 2019-10-18 ASSESSMENT — PAIN DESCRIPTION - LOCATION: LOCATION: HAND;LEG

## 2019-10-18 ASSESSMENT — PAIN DESCRIPTION - ONSET: ONSET: ON-GOING

## 2019-10-18 ASSESSMENT — ENCOUNTER SYMPTOMS
RESPIRATORY NEGATIVE: 1
GASTROINTESTINAL NEGATIVE: 1

## 2019-10-18 ASSESSMENT — PAIN DESCRIPTION - ORIENTATION: ORIENTATION: DISTAL

## 2019-10-18 ASSESSMENT — PAIN DESCRIPTION - PAIN TYPE: TYPE: PHANTOM PAIN;CHRONIC PAIN

## 2019-10-19 ENCOUNTER — APPOINTMENT (OUTPATIENT)
Dept: CT IMAGING | Age: 84
DRG: 872 | End: 2019-10-19
Payer: MEDICARE

## 2019-10-19 LAB
ACANTHOCYTES: 0
ALBUMIN SERPL-MCNC: 2.8 G/DL (ref 3.5–4.6)
ALP BLD-CCNC: 306 U/L (ref 40–130)
ALT SERPL-CCNC: 69 U/L (ref 0–33)
ANION GAP SERPL CALCULATED.3IONS-SCNC: 15 MEQ/L (ref 9–15)
ANISOCYTOSIS: 0
AST SERPL-CCNC: 56 U/L (ref 0–35)
AUER RODS: 0
BANDED NEUTROPHILS RELATIVE PERCENT: 7 % (ref 5–11)
BASOPHILIC STIPPLING: 0
BASOPHILS ABSOLUTE: 0 K/UL (ref 0–0.2)
BASOPHILS RELATIVE PERCENT: 0.8 %
BILIRUB SERPL-MCNC: 0.3 MG/DL (ref 0.2–0.7)
BILIRUBIN DIRECT: <0.2 MG/DL (ref 0–0.4)
BILIRUBIN, INDIRECT: ABNORMAL MG/DL (ref 0–0.6)
BUN BLDV-MCNC: 29 MG/DL (ref 8–23)
BURR CELLS: 0
CABOT RINGS: 0
CALCIUM SERPL-MCNC: 8.7 MG/DL (ref 8.5–9.9)
CHLORIDE BLD-SCNC: 107 MEQ/L (ref 95–107)
CO2: 16 MEQ/L (ref 20–31)
CREAT SERPL-MCNC: 1.8 MG/DL (ref 0.5–0.9)
DOHLE BODIES: 0
EOSINOPHILS ABSOLUTE: 0.6 K/UL (ref 0–0.7)
EOSINOPHILS RELATIVE PERCENT: 4 %
GFR AFRICAN AMERICAN: 31.8
GFR NON-AFRICAN AMERICAN: 26.3
GLUCOSE BLD-MCNC: 101 MG/DL (ref 60–115)
GLUCOSE BLD-MCNC: 105 MG/DL (ref 60–115)
GLUCOSE BLD-MCNC: 111 MG/DL (ref 60–115)
GLUCOSE BLD-MCNC: 97 MG/DL (ref 70–99)
GLUCOSE BLD-MCNC: 98 MG/DL (ref 60–115)
HAIRY CELLS: 0
HCT VFR BLD CALC: 28.1 % (ref 37–47)
HEMOGLOBIN: 9.1 G/DL (ref 12–16)
HOWELL-JOLLY BODIES: 0
HYPERSEGMENTED NEUTROPHILS: 0
HYPOCHROMIA: 0
LYMPHOCYTES ABSOLUTE: 1.3 K/UL (ref 1–4.8)
LYMPHOCYTES RELATIVE PERCENT: 9 %
MACROCYTES: 0
MCH RBC QN AUTO: 28.7 PG (ref 27–31.3)
MCHC RBC AUTO-ENTMCNC: 32.2 % (ref 33–37)
MCV RBC AUTO: 88.9 FL (ref 82–100)
MICROCYTES: 0
MONOCYTES ABSOLUTE: 1 K/UL (ref 0.2–0.8)
MONOCYTES RELATIVE PERCENT: 6.7 %
MYELOCYTE PERCENT: 1 %
NEUTROPHILS ABSOLUTE: 11.5 K/UL (ref 1.4–6.5)
NEUTROPHILS RELATIVE PERCENT: 73 %
OVALOCYTES: 0
PAPPENHEIMER BODIES: 0
PDW BLD-RTO: 15.5 % (ref 11.5–14.5)
PELGER HUET CELLS: 0 %
PERFORMED ON: NORMAL
PLATELET # BLD: 224 K/UL (ref 130–400)
PLATELET SLIDE REVIEW: NORMAL
POIKILOCYTES: 0
POLYCHROMASIA: 0
POTASSIUM REFLEX MAGNESIUM: 4.2 MEQ/L (ref 3.4–4.9)
RBC # BLD: 3.17 M/UL (ref 4.2–5.4)
SCHISTOCYTES: 0
SICKLE CELLS: 0
SODIUM BLD-SCNC: 138 MEQ/L (ref 135–144)
SPHEROCYTES: 0
STOMATOCYTES: 0
TARGET CELLS: 0
TEAR DROP CELLS: 0
TOTAL PROTEIN: 6.1 G/DL (ref 6.3–8)
TOXIC GRANULATION: 0
VACUOLATED NEUTROPHILS: 0
WBC # BLD: 14.2 K/UL (ref 4.8–10.8)

## 2019-10-19 PROCEDURE — 6360000002 HC RX W HCPCS: Performed by: INTERNAL MEDICINE

## 2019-10-19 PROCEDURE — 80048 BASIC METABOLIC PNL TOTAL CA: CPT

## 2019-10-19 PROCEDURE — 2580000003 HC RX 258: Performed by: INTERNAL MEDICINE

## 2019-10-19 PROCEDURE — 6370000000 HC RX 637 (ALT 250 FOR IP): Performed by: INTERNAL MEDICINE

## 2019-10-19 PROCEDURE — 36415 COLL VENOUS BLD VENIPUNCTURE: CPT

## 2019-10-19 PROCEDURE — 80076 HEPATIC FUNCTION PANEL: CPT

## 2019-10-19 PROCEDURE — 2500000003 HC RX 250 WO HCPCS

## 2019-10-19 PROCEDURE — 85025 COMPLETE CBC W/AUTO DIFF WBC: CPT

## 2019-10-19 PROCEDURE — 99232 SBSQ HOSP IP/OBS MODERATE 35: CPT | Performed by: INTERNAL MEDICINE

## 2019-10-19 PROCEDURE — 74176 CT ABD & PELVIS W/O CONTRAST: CPT

## 2019-10-19 PROCEDURE — 1210000000 HC MED SURG R&B

## 2019-10-19 PROCEDURE — 87040 BLOOD CULTURE FOR BACTERIA: CPT

## 2019-10-19 RX ADMIN — ASPIRIN 81 MG 81 MG: 81 TABLET ORAL at 11:52

## 2019-10-19 RX ADMIN — SODIUM CHLORIDE: 9 INJECTION, SOLUTION INTRAVENOUS at 19:43

## 2019-10-19 RX ADMIN — PANTOPRAZOLE SODIUM 40 MG: 40 TABLET, DELAYED RELEASE ORAL at 06:41

## 2019-10-19 RX ADMIN — CEFTRIAXONE SODIUM 1 G: 1 INJECTION, POWDER, FOR SOLUTION INTRAMUSCULAR; INTRAVENOUS at 16:50

## 2019-10-19 RX ADMIN — BARIUM SULFATE 450 ML: 20 SUSPENSION ORAL at 11:05

## 2019-10-19 RX ADMIN — ONDANSETRON 4 MG: 2 INJECTION INTRAMUSCULAR; INTRAVENOUS at 06:58

## 2019-10-19 RX ADMIN — ATORVASTATIN CALCIUM 40 MG: 40 TABLET, FILM COATED ORAL at 11:53

## 2019-10-19 RX ADMIN — APIXABAN 2.5 MG: 2.5 TABLET, FILM COATED ORAL at 11:53

## 2019-10-19 RX ADMIN — OXYCODONE HYDROCHLORIDE AND ACETAMINOPHEN 1 TABLET: 5; 325 TABLET ORAL at 18:36

## 2019-10-19 RX ADMIN — LETROZOLE 2.5 MG: 2.5 TABLET ORAL at 11:56

## 2019-10-19 RX ADMIN — APIXABAN 2.5 MG: 2.5 TABLET, FILM COATED ORAL at 19:43

## 2019-10-19 RX ADMIN — SODIUM CHLORIDE: 9 INJECTION, SOLUTION INTRAVENOUS at 00:28

## 2019-10-19 RX ADMIN — OXYCODONE HYDROCHLORIDE AND ACETAMINOPHEN 1 TABLET: 5; 325 TABLET ORAL at 06:58

## 2019-10-19 RX ADMIN — METOPROLOL TARTRATE 25 MG: 25 TABLET ORAL at 11:52

## 2019-10-19 RX ADMIN — SODIUM CHLORIDE: 9 INJECTION, SOLUTION INTRAVENOUS at 13:35

## 2019-10-19 RX ADMIN — Medication 10 ML: at 19:43

## 2019-10-19 RX ADMIN — GABAPENTIN 100 MG: 100 CAPSULE ORAL at 11:51

## 2019-10-19 RX ADMIN — VITAMIN D, TAB 1000IU (100/BT) 2000 UNITS: 25 TAB at 11:52

## 2019-10-19 RX ADMIN — LEVOTHYROXINE SODIUM 125 MCG: 125 TABLET ORAL at 06:41

## 2019-10-19 RX ADMIN — AMLODIPINE BESYLATE 5 MG: 5 TABLET ORAL at 11:52

## 2019-10-19 RX ADMIN — GABAPENTIN 200 MG: 100 CAPSULE ORAL at 19:45

## 2019-10-19 RX ADMIN — Medication 10 ML: at 11:57

## 2019-10-19 ASSESSMENT — PAIN SCALES - GENERAL
PAINLEVEL_OUTOF10: 5
PAINLEVEL_OUTOF10: 8
PAINLEVEL_OUTOF10: 4

## 2019-10-20 LAB
ALBUMIN SERPL-MCNC: 2.8 G/DL (ref 3.5–4.6)
ALP BLD-CCNC: 304 U/L (ref 40–130)
ALT SERPL-CCNC: 53 U/L (ref 0–33)
ANION GAP SERPL CALCULATED.3IONS-SCNC: 13 MEQ/L (ref 9–15)
AST SERPL-CCNC: 37 U/L (ref 0–35)
BASOPHILS ABSOLUTE: 0.2 K/UL (ref 0–0.2)
BASOPHILS RELATIVE PERCENT: 1.2 %
BILIRUB SERPL-MCNC: 0.3 MG/DL (ref 0.2–0.7)
BILIRUBIN DIRECT: <0.2 MG/DL (ref 0–0.4)
BILIRUBIN, INDIRECT: ABNORMAL MG/DL (ref 0–0.6)
BUN BLDV-MCNC: 21 MG/DL (ref 8–23)
CALCIUM SERPL-MCNC: 9 MG/DL (ref 8.5–9.9)
CHLORIDE BLD-SCNC: 107 MEQ/L (ref 95–107)
CO2: 17 MEQ/L (ref 20–31)
CREAT SERPL-MCNC: 1.45 MG/DL (ref 0.5–0.9)
EOSINOPHILS ABSOLUTE: 0.5 K/UL (ref 0–0.7)
EOSINOPHILS RELATIVE PERCENT: 3.3 %
GFR AFRICAN AMERICAN: 40.8
GFR NON-AFRICAN AMERICAN: 33.7
GLUCOSE BLD-MCNC: 103 MG/DL (ref 60–115)
GLUCOSE BLD-MCNC: 105 MG/DL (ref 70–99)
GLUCOSE BLD-MCNC: 109 MG/DL (ref 60–115)
GLUCOSE BLD-MCNC: 142 MG/DL (ref 60–115)
GLUCOSE BLD-MCNC: 204 MG/DL (ref 60–115)
HCT VFR BLD CALC: 28.8 % (ref 37–47)
HEMOGLOBIN: 9.3 G/DL (ref 12–16)
LYMPHOCYTES ABSOLUTE: 1.1 K/UL (ref 1–4.8)
LYMPHOCYTES RELATIVE PERCENT: 7 %
MCH RBC QN AUTO: 28.4 PG (ref 27–31.3)
MCHC RBC AUTO-ENTMCNC: 32.4 % (ref 33–37)
MCV RBC AUTO: 87.6 FL (ref 82–100)
MONOCYTES ABSOLUTE: 1.7 K/UL (ref 0.2–0.8)
MONOCYTES RELATIVE PERCENT: 10.6 %
NEUTROPHILS ABSOLUTE: 12.6 K/UL (ref 1.4–6.5)
NEUTROPHILS RELATIVE PERCENT: 77.9 %
PDW BLD-RTO: 14.9 % (ref 11.5–14.5)
PERFORMED ON: ABNORMAL
PERFORMED ON: ABNORMAL
PERFORMED ON: NORMAL
PERFORMED ON: NORMAL
PLATELET # BLD: 257 K/UL (ref 130–400)
POTASSIUM REFLEX MAGNESIUM: 3.9 MEQ/L (ref 3.4–4.9)
RBC # BLD: 3.29 M/UL (ref 4.2–5.4)
SODIUM BLD-SCNC: 137 MEQ/L (ref 135–144)
TOTAL PROTEIN: 6.3 G/DL (ref 6.3–8)
WBC # BLD: 16.2 K/UL (ref 4.8–10.8)

## 2019-10-20 PROCEDURE — 2580000003 HC RX 258: Performed by: INTERNAL MEDICINE

## 2019-10-20 PROCEDURE — 36415 COLL VENOUS BLD VENIPUNCTURE: CPT

## 2019-10-20 PROCEDURE — 6360000002 HC RX W HCPCS: Performed by: INTERNAL MEDICINE

## 2019-10-20 PROCEDURE — 85025 COMPLETE CBC W/AUTO DIFF WBC: CPT

## 2019-10-20 PROCEDURE — 6370000000 HC RX 637 (ALT 250 FOR IP): Performed by: INTERNAL MEDICINE

## 2019-10-20 PROCEDURE — 99231 SBSQ HOSP IP/OBS SF/LOW 25: CPT | Performed by: COLON & RECTAL SURGERY

## 2019-10-20 PROCEDURE — 80048 BASIC METABOLIC PNL TOTAL CA: CPT

## 2019-10-20 PROCEDURE — 80076 HEPATIC FUNCTION PANEL: CPT

## 2019-10-20 PROCEDURE — 1210000000 HC MED SURG R&B

## 2019-10-20 RX ADMIN — VITAMIN D, TAB 1000IU (100/BT) 2000 UNITS: 25 TAB at 08:30

## 2019-10-20 RX ADMIN — ACETAMINOPHEN 650 MG: 325 TABLET ORAL at 21:49

## 2019-10-20 RX ADMIN — CEFTRIAXONE SODIUM 1 G: 1 INJECTION, POWDER, FOR SOLUTION INTRAMUSCULAR; INTRAVENOUS at 16:11

## 2019-10-20 RX ADMIN — OXYCODONE HYDROCHLORIDE AND ACETAMINOPHEN 1 TABLET: 5; 325 TABLET ORAL at 16:10

## 2019-10-20 RX ADMIN — Medication 10 ML: at 08:31

## 2019-10-20 RX ADMIN — GABAPENTIN 100 MG: 100 CAPSULE ORAL at 08:30

## 2019-10-20 RX ADMIN — GABAPENTIN 200 MG: 100 CAPSULE ORAL at 19:50

## 2019-10-20 RX ADMIN — INSULIN LISPRO 2 UNITS: 100 INJECTION, SOLUTION INTRAVENOUS; SUBCUTANEOUS at 14:00

## 2019-10-20 RX ADMIN — LETROZOLE 2.5 MG: 2.5 TABLET ORAL at 08:30

## 2019-10-20 RX ADMIN — APIXABAN 2.5 MG: 2.5 TABLET, FILM COATED ORAL at 19:50

## 2019-10-20 RX ADMIN — ASPIRIN 81 MG 81 MG: 81 TABLET ORAL at 08:30

## 2019-10-20 RX ADMIN — PANTOPRAZOLE SODIUM 40 MG: 40 TABLET, DELAYED RELEASE ORAL at 08:38

## 2019-10-20 RX ADMIN — INSULIN GLARGINE 40 UNITS: 100 INJECTION, SOLUTION SUBCUTANEOUS at 21:49

## 2019-10-20 RX ADMIN — APIXABAN 2.5 MG: 2.5 TABLET, FILM COATED ORAL at 08:30

## 2019-10-20 RX ADMIN — AMLODIPINE BESYLATE 5 MG: 5 TABLET ORAL at 08:30

## 2019-10-20 RX ADMIN — METOPROLOL TARTRATE 25 MG: 25 TABLET ORAL at 08:30

## 2019-10-20 RX ADMIN — ATORVASTATIN CALCIUM 40 MG: 40 TABLET, FILM COATED ORAL at 08:30

## 2019-10-20 RX ADMIN — Medication 10 ML: at 19:50

## 2019-10-20 RX ADMIN — ACETAMINOPHEN 650 MG: 325 TABLET ORAL at 00:07

## 2019-10-20 RX ADMIN — SODIUM CHLORIDE: 9 INJECTION, SOLUTION INTRAVENOUS at 14:05

## 2019-10-20 RX ADMIN — OXYCODONE HYDROCHLORIDE AND ACETAMINOPHEN 1 TABLET: 5; 325 TABLET ORAL at 08:37

## 2019-10-20 ASSESSMENT — PAIN SCALES - GENERAL
PAINLEVEL_OUTOF10: 10
PAINLEVEL_OUTOF10: 6
PAINLEVEL_OUTOF10: 8
PAINLEVEL_OUTOF10: 7

## 2019-10-21 LAB
ALBUMIN SERPL-MCNC: 2.6 G/DL (ref 3.5–4.6)
ALP BLD-CCNC: 264 U/L (ref 40–130)
ALT SERPL-CCNC: 38 U/L (ref 0–33)
ANION GAP SERPL CALCULATED.3IONS-SCNC: 14 MEQ/L (ref 9–15)
AST SERPL-CCNC: 25 U/L (ref 0–35)
ATYPICAL LYMPHOCYTE RELATIVE PERCENT: 1 %
BASOPHILS ABSOLUTE: 0 K/UL (ref 0–0.2)
BASOPHILS RELATIVE PERCENT: 0.9 %
BILIRUB SERPL-MCNC: <0.2 MG/DL (ref 0.2–0.7)
BILIRUBIN DIRECT: <0.2 MG/DL (ref 0–0.4)
BILIRUBIN, INDIRECT: ABNORMAL MG/DL (ref 0–0.6)
BUN BLDV-MCNC: 16 MG/DL (ref 8–23)
CALCIUM SERPL-MCNC: 8.7 MG/DL (ref 8.5–9.9)
CHLORIDE BLD-SCNC: 106 MEQ/L (ref 95–107)
CO2: 18 MEQ/L (ref 20–31)
CREAT SERPL-MCNC: 1.22 MG/DL (ref 0.5–0.9)
EOSINOPHILS ABSOLUTE: 0.8 K/UL (ref 0–0.7)
EOSINOPHILS RELATIVE PERCENT: 5 %
GFR AFRICAN AMERICAN: 49.8
GFR NON-AFRICAN AMERICAN: 41.1
GLUCOSE BLD-MCNC: 109 MG/DL (ref 70–99)
GLUCOSE BLD-MCNC: 124 MG/DL (ref 60–115)
GLUCOSE BLD-MCNC: 147 MG/DL (ref 60–115)
GLUCOSE BLD-MCNC: 147 MG/DL (ref 60–115)
GLUCOSE BLD-MCNC: 169 MG/DL (ref 60–115)
HCT VFR BLD CALC: 27.5 % (ref 37–47)
HEMOGLOBIN: 8.7 G/DL (ref 12–16)
LYMPHOCYTES ABSOLUTE: 0.8 K/UL (ref 1–4.8)
LYMPHOCYTES RELATIVE PERCENT: 4 %
MCH RBC QN AUTO: 27.9 PG (ref 27–31.3)
MCHC RBC AUTO-ENTMCNC: 31.7 % (ref 33–37)
MCV RBC AUTO: 87.9 FL (ref 82–100)
METAMYELOCYTES RELATIVE PERCENT: 2 %
MONOCYTES ABSOLUTE: 1.2 K/UL (ref 0.2–0.8)
MONOCYTES RELATIVE PERCENT: 8.4 %
NEUTROPHILS ABSOLUTE: 12.8 K/UL (ref 1.4–6.5)
NEUTROPHILS RELATIVE PERCENT: 80 %
NUCLEATED RED BLOOD CELLS: 1 /100 WBC
PDW BLD-RTO: 15.1 % (ref 11.5–14.5)
PERFORMED ON: ABNORMAL
PLATELET # BLD: 281 K/UL (ref 130–400)
PLATELET SLIDE REVIEW: NORMAL
POTASSIUM REFLEX MAGNESIUM: 3.8 MEQ/L (ref 3.4–4.9)
RBC # BLD: 3.13 M/UL (ref 4.2–5.4)
RBC # BLD: NORMAL 10*6/UL
SODIUM BLD-SCNC: 138 MEQ/L (ref 135–144)
TOTAL PROTEIN: 6.2 G/DL (ref 6.3–8)
WBC # BLD: 15.6 K/UL (ref 4.8–10.8)

## 2019-10-21 PROCEDURE — 80076 HEPATIC FUNCTION PANEL: CPT

## 2019-10-21 PROCEDURE — 99232 SBSQ HOSP IP/OBS MODERATE 35: CPT | Performed by: INTERNAL MEDICINE

## 2019-10-21 PROCEDURE — 36415 COLL VENOUS BLD VENIPUNCTURE: CPT

## 2019-10-21 PROCEDURE — 2580000003 HC RX 258: Performed by: INTERNAL MEDICINE

## 2019-10-21 PROCEDURE — 6360000002 HC RX W HCPCS: Performed by: INTERNAL MEDICINE

## 2019-10-21 PROCEDURE — 99232 SBSQ HOSP IP/OBS MODERATE 35: CPT | Performed by: SURGERY

## 2019-10-21 PROCEDURE — 85025 COMPLETE CBC W/AUTO DIFF WBC: CPT

## 2019-10-21 PROCEDURE — 1210000000 HC MED SURG R&B

## 2019-10-21 PROCEDURE — 6370000000 HC RX 637 (ALT 250 FOR IP): Performed by: INTERNAL MEDICINE

## 2019-10-21 PROCEDURE — 80048 BASIC METABOLIC PNL TOTAL CA: CPT

## 2019-10-21 PROCEDURE — 97161 PT EVAL LOW COMPLEX 20 MIN: CPT

## 2019-10-21 RX ORDER — CEPHALEXIN 500 MG/1
500 CAPSULE ORAL EVERY 12 HOURS SCHEDULED
Status: DISCONTINUED | OUTPATIENT
Start: 2019-10-21 | End: 2019-10-23 | Stop reason: HOSPADM

## 2019-10-21 RX ADMIN — AMLODIPINE BESYLATE 5 MG: 5 TABLET ORAL at 08:31

## 2019-10-21 RX ADMIN — VITAMIN D, TAB 1000IU (100/BT) 2000 UNITS: 25 TAB at 08:31

## 2019-10-21 RX ADMIN — APIXABAN 2.5 MG: 2.5 TABLET, FILM COATED ORAL at 08:32

## 2019-10-21 RX ADMIN — OXYCODONE HYDROCHLORIDE AND ACETAMINOPHEN 1 TABLET: 5; 325 TABLET ORAL at 14:26

## 2019-10-21 RX ADMIN — METOPROLOL TARTRATE 25 MG: 25 TABLET ORAL at 21:55

## 2019-10-21 RX ADMIN — ATORVASTATIN CALCIUM 40 MG: 40 TABLET, FILM COATED ORAL at 08:31

## 2019-10-21 RX ADMIN — LEVOTHYROXINE SODIUM 125 MCG: 125 TABLET ORAL at 05:59

## 2019-10-21 RX ADMIN — APIXABAN 2.5 MG: 2.5 TABLET, FILM COATED ORAL at 21:55

## 2019-10-21 RX ADMIN — PANTOPRAZOLE SODIUM 40 MG: 40 TABLET, DELAYED RELEASE ORAL at 05:59

## 2019-10-21 RX ADMIN — GABAPENTIN 200 MG: 100 CAPSULE ORAL at 21:55

## 2019-10-21 RX ADMIN — SODIUM CHLORIDE: 9 INJECTION, SOLUTION INTRAVENOUS at 18:00

## 2019-10-21 RX ADMIN — INSULIN LISPRO 2 UNITS: 100 INJECTION, SOLUTION INTRAVENOUS; SUBCUTANEOUS at 17:59

## 2019-10-21 RX ADMIN — LETROZOLE 2.5 MG: 2.5 TABLET ORAL at 12:11

## 2019-10-21 RX ADMIN — ONDANSETRON 4 MG: 2 INJECTION INTRAMUSCULAR; INTRAVENOUS at 08:31

## 2019-10-21 RX ADMIN — METOPROLOL TARTRATE 25 MG: 25 TABLET ORAL at 08:32

## 2019-10-21 RX ADMIN — INSULIN LISPRO 2 UNITS: 100 INJECTION, SOLUTION INTRAVENOUS; SUBCUTANEOUS at 12:11

## 2019-10-21 RX ADMIN — ACETAMINOPHEN 650 MG: 325 TABLET ORAL at 22:02

## 2019-10-21 RX ADMIN — ASPIRIN 81 MG 81 MG: 81 TABLET ORAL at 08:31

## 2019-10-21 RX ADMIN — SODIUM CHLORIDE: 9 INJECTION, SOLUTION INTRAVENOUS at 05:59

## 2019-10-21 RX ADMIN — Medication 10 ML: at 08:36

## 2019-10-21 RX ADMIN — INSULIN GLARGINE 40 UNITS: 100 INJECTION, SOLUTION SUBCUTANEOUS at 22:23

## 2019-10-21 RX ADMIN — GABAPENTIN 100 MG: 100 CAPSULE ORAL at 08:31

## 2019-10-21 ASSESSMENT — PAIN DESCRIPTION - DESCRIPTORS: DESCRIPTORS: ACHING

## 2019-10-21 ASSESSMENT — PAIN DESCRIPTION - ORIENTATION
ORIENTATION: RIGHT
ORIENTATION: RIGHT

## 2019-10-21 ASSESSMENT — ENCOUNTER SYMPTOMS
RESPIRATORY NEGATIVE: 1
COUGH: 0
VOMITING: 0
GASTROINTESTINAL NEGATIVE: 1
NAUSEA: 0
DIARRHEA: 0
SHORTNESS OF BREATH: 0

## 2019-10-21 ASSESSMENT — PAIN SCALES - GENERAL
PAINLEVEL_OUTOF10: 10
PAINLEVEL_OUTOF10: 10
PAINLEVEL_OUTOF10: 5
PAINLEVEL_OUTOF10: 9

## 2019-10-21 ASSESSMENT — PAIN DESCRIPTION - LOCATION
LOCATION: HAND
LOCATION: HAND

## 2019-10-21 ASSESSMENT — PAIN DESCRIPTION - PAIN TYPE
TYPE: ACUTE PAIN
TYPE: ACUTE PAIN

## 2019-10-22 LAB
ALBUMIN SERPL-MCNC: 2.9 G/DL (ref 3.5–4.6)
ALP BLD-CCNC: 238 U/L (ref 40–130)
ALT SERPL-CCNC: 34 U/L (ref 0–33)
ANION GAP SERPL CALCULATED.3IONS-SCNC: 12 MEQ/L (ref 9–15)
ANISOCYTOSIS: ABNORMAL
AST SERPL-CCNC: 26 U/L (ref 0–35)
ATYPICAL LYMPHOCYTE RELATIVE PERCENT: 1 %
BASOPHILS ABSOLUTE: 0.3 K/UL (ref 0–0.2)
BASOPHILS RELATIVE PERCENT: 2 %
BILIRUB SERPL-MCNC: 0.3 MG/DL (ref 0.2–0.7)
BILIRUBIN DIRECT: <0.2 MG/DL (ref 0–0.4)
BILIRUBIN, INDIRECT: ABNORMAL MG/DL (ref 0–0.6)
BUN BLDV-MCNC: 16 MG/DL (ref 8–23)
CALCIUM SERPL-MCNC: 8.6 MG/DL (ref 8.5–9.9)
CHLORIDE BLD-SCNC: 107 MEQ/L (ref 95–107)
CO2: 20 MEQ/L (ref 20–31)
CREAT SERPL-MCNC: 1.28 MG/DL (ref 0.5–0.9)
EOSINOPHILS ABSOLUTE: 0.6 K/UL (ref 0–0.7)
EOSINOPHILS RELATIVE PERCENT: 4 %
GFR AFRICAN AMERICAN: 47.1
GFR NON-AFRICAN AMERICAN: 38.9
GLUCOSE BLD-MCNC: 113 MG/DL (ref 60–115)
GLUCOSE BLD-MCNC: 181 MG/DL (ref 60–115)
GLUCOSE BLD-MCNC: 94 MG/DL (ref 70–99)
HCT VFR BLD CALC: 26.6 % (ref 37–47)
HEMOGLOBIN: 8.8 G/DL (ref 12–16)
LYMPHOCYTES ABSOLUTE: 2.6 K/UL (ref 1–4.8)
LYMPHOCYTES RELATIVE PERCENT: 16 %
MCH RBC QN AUTO: 28.5 PG (ref 27–31.3)
MCHC RBC AUTO-ENTMCNC: 33 % (ref 33–37)
MCV RBC AUTO: 86.4 FL (ref 82–100)
MONOCYTES ABSOLUTE: 0.9 K/UL (ref 0.2–0.8)
MONOCYTES RELATIVE PERCENT: 6.2 %
MYELOCYTE PERCENT: 1 %
NEUTROPHILS ABSOLUTE: 11.1 K/UL (ref 1.4–6.5)
NEUTROPHILS RELATIVE PERCENT: 71 %
PDW BLD-RTO: 15 % (ref 11.5–14.5)
PERFORMED ON: ABNORMAL
PERFORMED ON: NORMAL
PLATELET # BLD: 352 K/UL (ref 130–400)
PLATELET SLIDE REVIEW: ADEQUATE
POTASSIUM REFLEX MAGNESIUM: 3.6 MEQ/L (ref 3.4–4.9)
RBC # BLD: 3.08 M/UL (ref 4.2–5.4)
SLIDE REVIEW: ABNORMAL
SODIUM BLD-SCNC: 139 MEQ/L (ref 135–144)
TOTAL PROTEIN: 6.1 G/DL (ref 6.3–8)
WBC # BLD: 15.4 K/UL (ref 4.8–10.8)

## 2019-10-22 PROCEDURE — 80076 HEPATIC FUNCTION PANEL: CPT

## 2019-10-22 PROCEDURE — 36415 COLL VENOUS BLD VENIPUNCTURE: CPT

## 2019-10-22 PROCEDURE — 80048 BASIC METABOLIC PNL TOTAL CA: CPT

## 2019-10-22 PROCEDURE — 6370000000 HC RX 637 (ALT 250 FOR IP): Performed by: INTERNAL MEDICINE

## 2019-10-22 PROCEDURE — 99232 SBSQ HOSP IP/OBS MODERATE 35: CPT | Performed by: SURGERY

## 2019-10-22 PROCEDURE — 99232 SBSQ HOSP IP/OBS MODERATE 35: CPT | Performed by: INTERNAL MEDICINE

## 2019-10-22 PROCEDURE — 85025 COMPLETE CBC W/AUTO DIFF WBC: CPT

## 2019-10-22 PROCEDURE — 1210000000 HC MED SURG R&B

## 2019-10-22 RX ORDER — BISACODYL 10 MG
10 SUPPOSITORY, RECTAL RECTAL DAILY
Status: DISCONTINUED | OUTPATIENT
Start: 2019-10-22 | End: 2019-10-23 | Stop reason: HOSPADM

## 2019-10-22 RX ADMIN — LETROZOLE 2.5 MG: 2.5 TABLET ORAL at 08:28

## 2019-10-22 RX ADMIN — ACETAMINOPHEN 650 MG: 325 TABLET ORAL at 22:54

## 2019-10-22 RX ADMIN — CEPHALEXIN 500 MG: 500 CAPSULE ORAL at 20:50

## 2019-10-22 RX ADMIN — ASPIRIN 81 MG 81 MG: 81 TABLET ORAL at 08:28

## 2019-10-22 RX ADMIN — LEVOTHYROXINE SODIUM 125 MCG: 125 TABLET ORAL at 06:19

## 2019-10-22 RX ADMIN — METOPROLOL TARTRATE 25 MG: 25 TABLET ORAL at 20:50

## 2019-10-22 RX ADMIN — INSULIN LISPRO 2 UNITS: 100 INJECTION, SOLUTION INTRAVENOUS; SUBCUTANEOUS at 17:53

## 2019-10-22 RX ADMIN — OXYCODONE HYDROCHLORIDE AND ACETAMINOPHEN 1 TABLET: 5; 325 TABLET ORAL at 00:15

## 2019-10-22 RX ADMIN — VITAMIN D, TAB 1000IU (100/BT) 2000 UNITS: 25 TAB at 08:28

## 2019-10-22 RX ADMIN — AMLODIPINE BESYLATE 5 MG: 5 TABLET ORAL at 08:33

## 2019-10-22 RX ADMIN — OXYCODONE HYDROCHLORIDE AND ACETAMINOPHEN 1 TABLET: 5; 325 TABLET ORAL at 08:33

## 2019-10-22 RX ADMIN — APIXABAN 2.5 MG: 2.5 TABLET, FILM COATED ORAL at 08:28

## 2019-10-22 RX ADMIN — CEPHALEXIN 500 MG: 500 CAPSULE ORAL at 08:28

## 2019-10-22 RX ADMIN — GABAPENTIN 100 MG: 100 CAPSULE ORAL at 08:33

## 2019-10-22 RX ADMIN — CEPHALEXIN 500 MG: 500 CAPSULE ORAL at 00:07

## 2019-10-22 RX ADMIN — PANTOPRAZOLE SODIUM 40 MG: 40 TABLET, DELAYED RELEASE ORAL at 06:19

## 2019-10-22 RX ADMIN — GABAPENTIN 200 MG: 100 CAPSULE ORAL at 20:50

## 2019-10-22 RX ADMIN — MENTHOL AND METHYL SALICYLATE: 7.6; 29 OINTMENT TOPICAL at 20:49

## 2019-10-22 RX ADMIN — OXYCODONE HYDROCHLORIDE AND ACETAMINOPHEN 1 TABLET: 5; 325 TABLET ORAL at 17:50

## 2019-10-22 RX ADMIN — ATORVASTATIN CALCIUM 40 MG: 40 TABLET, FILM COATED ORAL at 08:28

## 2019-10-22 ASSESSMENT — PAIN SCALES - GENERAL
PAINLEVEL_OUTOF10: 6
PAINLEVEL_OUTOF10: 7
PAINLEVEL_OUTOF10: 5
PAINLEVEL_OUTOF10: 6
PAINLEVEL_OUTOF10: 6

## 2019-10-22 ASSESSMENT — ENCOUNTER SYMPTOMS
RESPIRATORY NEGATIVE: 1
GASTROINTESTINAL NEGATIVE: 1

## 2019-10-23 ENCOUNTER — APPOINTMENT (OUTPATIENT)
Dept: INTERVENTIONAL RADIOLOGY/VASCULAR | Age: 84
DRG: 872 | End: 2019-10-23
Payer: MEDICARE

## 2019-10-23 VITALS
OXYGEN SATURATION: 96 % | DIASTOLIC BLOOD PRESSURE: 46 MMHG | WEIGHT: 148 LBS | SYSTOLIC BLOOD PRESSURE: 172 MMHG | HEART RATE: 52 BPM | BODY MASS INDEX: 22.43 KG/M2 | RESPIRATION RATE: 16 BRPM | TEMPERATURE: 98.4 F | HEIGHT: 68 IN

## 2019-10-23 LAB
ALBUMIN SERPL-MCNC: 3 G/DL (ref 3.5–4.6)
ALP BLD-CCNC: 232 U/L (ref 40–130)
ALT SERPL-CCNC: 30 U/L (ref 0–33)
ANION GAP SERPL CALCULATED.3IONS-SCNC: 14 MEQ/L (ref 9–15)
ANISOCYTOSIS: ABNORMAL
AST SERPL-CCNC: 24 U/L (ref 0–35)
BANDED NEUTROPHILS RELATIVE PERCENT: 9 % (ref 5–11)
BASOPHILS ABSOLUTE: 0.2 K/UL (ref 0–0.2)
BASOPHILS RELATIVE PERCENT: 1 %
BILIRUB SERPL-MCNC: 0.3 MG/DL (ref 0.2–0.7)
BILIRUBIN DIRECT: <0.2 MG/DL (ref 0–0.4)
BILIRUBIN, INDIRECT: ABNORMAL MG/DL (ref 0–0.6)
BUN BLDV-MCNC: 14 MG/DL (ref 8–23)
CALCIUM SERPL-MCNC: 8.8 MG/DL (ref 8.5–9.9)
CHLORIDE BLD-SCNC: 105 MEQ/L (ref 95–107)
CO2: 21 MEQ/L (ref 20–31)
CREAT SERPL-MCNC: 1.26 MG/DL (ref 0.5–0.9)
EOSINOPHILS ABSOLUTE: 0.2 K/UL (ref 0–0.7)
EOSINOPHILS RELATIVE PERCENT: 1 %
GFR AFRICAN AMERICAN: 48
GFR NON-AFRICAN AMERICAN: 39.6
GLUCOSE BLD-MCNC: 159 MG/DL (ref 60–115)
GLUCOSE BLD-MCNC: 87 MG/DL (ref 70–99)
GLUCOSE BLD-MCNC: 95 MG/DL (ref 60–115)
HCT VFR BLD CALC: 29.3 % (ref 37–47)
HEMOGLOBIN: 9.5 G/DL (ref 12–16)
LYMPHOCYTES ABSOLUTE: 2.4 K/UL (ref 1–4.8)
LYMPHOCYTES RELATIVE PERCENT: 14 %
MCH RBC QN AUTO: 28.2 PG (ref 27–31.3)
MCHC RBC AUTO-ENTMCNC: 32.4 % (ref 33–37)
MCV RBC AUTO: 87.1 FL (ref 82–100)
METAMYELOCYTES RELATIVE PERCENT: 3 %
MONOCYTES ABSOLUTE: 0.3 K/UL (ref 0.2–0.8)
MONOCYTES RELATIVE PERCENT: 1.9 %
NEUTROPHILS ABSOLUTE: 14 K/UL (ref 1.4–6.5)
NEUTROPHILS RELATIVE PERCENT: 71 %
PDW BLD-RTO: 15 % (ref 11.5–14.5)
PERFORMED ON: ABNORMAL
PERFORMED ON: NORMAL
PLATELET # BLD: 423 K/UL (ref 130–400)
PLATELET SLIDE REVIEW: ABNORMAL
POTASSIUM REFLEX MAGNESIUM: 3.7 MEQ/L (ref 3.4–4.9)
RBC # BLD: 3.36 M/UL (ref 4.2–5.4)
SODIUM BLD-SCNC: 140 MEQ/L (ref 135–144)
TOTAL PROTEIN: 6.5 G/DL (ref 6.3–8)
WBC # BLD: 16.9 K/UL (ref 4.8–10.8)

## 2019-10-23 PROCEDURE — 80076 HEPATIC FUNCTION PANEL: CPT

## 2019-10-23 PROCEDURE — 36415 COLL VENOUS BLD VENIPUNCTURE: CPT

## 2019-10-23 PROCEDURE — 6370000000 HC RX 637 (ALT 250 FOR IP): Performed by: INTERNAL MEDICINE

## 2019-10-23 PROCEDURE — 85025 COMPLETE CBC W/AUTO DIFF WBC: CPT

## 2019-10-23 PROCEDURE — 2580000003 HC RX 258: Performed by: INTERNAL MEDICINE

## 2019-10-23 PROCEDURE — 2500000003 HC RX 250 WO HCPCS: Performed by: INTERNAL MEDICINE

## 2019-10-23 PROCEDURE — 99232 SBSQ HOSP IP/OBS MODERATE 35: CPT | Performed by: SURGERY

## 2019-10-23 PROCEDURE — 2709999900 IR PICC WO SQ PORT/PUMP > 5 YEARS

## 2019-10-23 PROCEDURE — 36573 INSJ PICC RS&I 5 YR+: CPT | Performed by: RADIOLOGY

## 2019-10-23 PROCEDURE — 99232 SBSQ HOSP IP/OBS MODERATE 35: CPT | Performed by: INTERNAL MEDICINE

## 2019-10-23 PROCEDURE — 80048 BASIC METABOLIC PNL TOTAL CA: CPT

## 2019-10-23 PROCEDURE — 6360000002 HC RX W HCPCS: Performed by: INTERNAL MEDICINE

## 2019-10-23 PROCEDURE — 02HV33Z INSERTION OF INFUSION DEVICE INTO SUPERIOR VENA CAVA, PERCUTANEOUS APPROACH: ICD-10-PCS | Performed by: RADIOLOGY

## 2019-10-23 RX ORDER — SODIUM CHLORIDE 9 MG/ML
250 INJECTION, SOLUTION INTRAVENOUS ONCE
Status: DISCONTINUED | OUTPATIENT
Start: 2019-10-23 | End: 2019-10-23 | Stop reason: HOSPADM

## 2019-10-23 RX ORDER — SODIUM CHLORIDE 0.9 % (FLUSH) 0.9 %
10 SYRINGE (ML) INJECTION PRN
Status: DISCONTINUED | OUTPATIENT
Start: 2019-10-23 | End: 2019-10-23 | Stop reason: HOSPADM

## 2019-10-23 RX ORDER — SODIUM CHLORIDE 0.9 % (FLUSH) 0.9 %
10 SYRINGE (ML) INJECTION EVERY 12 HOURS SCHEDULED
Status: DISCONTINUED | OUTPATIENT
Start: 2019-10-23 | End: 2019-10-23 | Stop reason: HOSPADM

## 2019-10-23 RX ORDER — LIDOCAINE HYDROCHLORIDE 20 MG/ML
5 INJECTION, SOLUTION INFILTRATION; PERINEURAL ONCE
Status: COMPLETED | OUTPATIENT
Start: 2019-10-23 | End: 2019-10-23

## 2019-10-23 RX ADMIN — GABAPENTIN 100 MG: 100 CAPSULE ORAL at 08:53

## 2019-10-23 RX ADMIN — OXYCODONE HYDROCHLORIDE AND ACETAMINOPHEN 1 TABLET: 5; 325 TABLET ORAL at 09:48

## 2019-10-23 RX ADMIN — AMLODIPINE BESYLATE 5 MG: 5 TABLET ORAL at 08:54

## 2019-10-23 RX ADMIN — PANTOPRAZOLE SODIUM 40 MG: 40 TABLET, DELAYED RELEASE ORAL at 06:23

## 2019-10-23 RX ADMIN — LETROZOLE 2.5 MG: 2.5 TABLET ORAL at 09:54

## 2019-10-23 RX ADMIN — CEFTRIAXONE SODIUM 1 G: 1 INJECTION, POWDER, FOR SOLUTION INTRAMUSCULAR; INTRAVENOUS at 13:55

## 2019-10-23 RX ADMIN — ATORVASTATIN CALCIUM 40 MG: 40 TABLET, FILM COATED ORAL at 08:54

## 2019-10-23 RX ADMIN — SODIUM CHLORIDE: 9 INJECTION, SOLUTION INTRAVENOUS at 11:48

## 2019-10-23 RX ADMIN — VITAMIN D, TAB 1000IU (100/BT) 2000 UNITS: 25 TAB at 08:53

## 2019-10-23 RX ADMIN — LIDOCAINE HYDROCHLORIDE 5 ML: 20 INJECTION, SOLUTION INFILTRATION; PERINEURAL at 11:52

## 2019-10-23 RX ADMIN — LEVOTHYROXINE SODIUM 125 MCG: 125 TABLET ORAL at 06:22

## 2019-10-23 RX ADMIN — METOPROLOL TARTRATE 25 MG: 25 TABLET ORAL at 08:53

## 2019-10-23 RX ADMIN — CEPHALEXIN 500 MG: 500 CAPSULE ORAL at 08:53

## 2019-10-23 ASSESSMENT — ENCOUNTER SYMPTOMS
RESPIRATORY NEGATIVE: 1
VOMITING: 0
DIARRHEA: 0
GASTROINTESTINAL NEGATIVE: 1
SHORTNESS OF BREATH: 0
COUGH: 0
NAUSEA: 0

## 2019-10-23 ASSESSMENT — PAIN SCALES - GENERAL
PAINLEVEL_OUTOF10: 7
PAINLEVEL_OUTOF10: 0

## 2019-10-24 ENCOUNTER — OFFICE VISIT (OUTPATIENT)
Dept: GERIATRIC MEDICINE | Age: 84
End: 2019-10-24
Payer: MEDICARE

## 2019-10-24 DIAGNOSIS — E11.9 DIABETES MELLITUS WITHOUT COMPLICATION (HCC): ICD-10-CM

## 2019-10-24 DIAGNOSIS — E03.9 HYPOTHYROIDISM, UNSPECIFIED TYPE: ICD-10-CM

## 2019-10-24 DIAGNOSIS — B96.20 E COLI BACTEREMIA: Primary | ICD-10-CM

## 2019-10-24 DIAGNOSIS — R78.81 E COLI BACTEREMIA: Primary | ICD-10-CM

## 2019-10-24 DIAGNOSIS — I73.9 PVD (PERIPHERAL VASCULAR DISEASE) (HCC): ICD-10-CM

## 2019-10-24 DIAGNOSIS — I10 ESSENTIAL HYPERTENSION: ICD-10-CM

## 2019-10-24 LAB — BLOOD CULTURE, ROUTINE: NORMAL

## 2019-10-24 PROCEDURE — G8484 FLU IMMUNIZE NO ADMIN: HCPCS | Performed by: INTERNAL MEDICINE

## 2019-10-24 PROCEDURE — 99305 1ST NF CARE MODERATE MDM 35: CPT | Performed by: INTERNAL MEDICINE

## 2019-10-24 PROCEDURE — 1123F ACP DISCUSS/DSCN MKR DOCD: CPT | Performed by: INTERNAL MEDICINE

## 2019-10-27 LAB — CULTURE, BLOOD 2: NORMAL

## 2019-10-29 VITALS — SYSTOLIC BLOOD PRESSURE: 127 MMHG | DIASTOLIC BLOOD PRESSURE: 40 MMHG | TEMPERATURE: 98.8 F | HEART RATE: 76 BPM

## 2019-10-31 LAB
ANION GAP SERPL CALCULATED.3IONS-SCNC: 16 MEQ/L (ref 9–15)
ANISOCYTOSIS: ABNORMAL
BASOPHILS ABSOLUTE: 0.5 K/UL (ref 0–0.2)
BASOPHILS RELATIVE PERCENT: 2 %
BUN BLDV-MCNC: 23 MG/DL (ref 8–23)
CALCIUM SERPL-MCNC: 9 MG/DL (ref 8.5–9.9)
CHLORIDE BLD-SCNC: 104 MEQ/L (ref 95–107)
CO2: 21 MEQ/L (ref 20–31)
CREAT SERPL-MCNC: 1.6 MG/DL (ref 0.5–0.9)
EOSINOPHILS ABSOLUTE: 0 K/UL (ref 0–0.7)
EOSINOPHILS RELATIVE PERCENT: 0.5 %
GFR AFRICAN AMERICAN: 36.4
GFR NON-AFRICAN AMERICAN: 30.1
GLUCOSE BLD-MCNC: 205 MG/DL (ref 70–99)
HCT VFR BLD CALC: 30.3 % (ref 37–47)
HEMOGLOBIN: 9.5 G/DL (ref 12–16)
LYMPHOCYTES ABSOLUTE: 1.9 K/UL (ref 1–4.8)
LYMPHOCYTES RELATIVE PERCENT: 8 %
MCH RBC QN AUTO: 27.8 PG (ref 27–31.3)
MCHC RBC AUTO-ENTMCNC: 31.4 % (ref 33–37)
MCV RBC AUTO: 88.6 FL (ref 82–100)
MONOCYTES ABSOLUTE: 0.9 K/UL (ref 0.2–0.8)
MONOCYTES RELATIVE PERCENT: 3.8 %
NEUTROPHILS ABSOLUTE: 20.2 K/UL (ref 1.4–6.5)
NEUTROPHILS RELATIVE PERCENT: 87 %
PDW BLD-RTO: 15.5 % (ref 11.5–14.5)
PLATELET # BLD: 399 K/UL (ref 130–400)
PLATELET SLIDE REVIEW: NORMAL
POLYCHROMASIA: ABNORMAL
POTASSIUM SERPL-SCNC: 4.6 MEQ/L (ref 3.4–4.9)
RBC # BLD: 3.42 M/UL (ref 4.2–5.4)
SODIUM BLD-SCNC: 141 MEQ/L (ref 135–144)
TOXIC GRANULATION: ABNORMAL
WBC # BLD: 23.2 K/UL (ref 4.8–10.8)

## 2019-11-07 ENCOUNTER — OFFICE VISIT (OUTPATIENT)
Dept: GERIATRIC MEDICINE | Age: 84
End: 2019-11-07
Payer: MEDICARE

## 2019-11-07 DIAGNOSIS — M79.641 RIGHT HAND PAIN: ICD-10-CM

## 2019-11-07 DIAGNOSIS — F34.1 DYSTHYMIA: ICD-10-CM

## 2019-11-07 DIAGNOSIS — A41.9 SEPSIS, DUE TO UNSPECIFIED ORGANISM, UNSPECIFIED WHETHER ACUTE ORGAN DYSFUNCTION PRESENT (HCC): Primary | ICD-10-CM

## 2019-11-07 PROCEDURE — 1123F ACP DISCUSS/DSCN MKR DOCD: CPT | Performed by: PHYSICIAN ASSISTANT

## 2019-11-07 PROCEDURE — G8484 FLU IMMUNIZE NO ADMIN: HCPCS | Performed by: PHYSICIAN ASSISTANT

## 2019-11-07 PROCEDURE — 99309 SBSQ NF CARE MODERATE MDM 30: CPT | Performed by: PHYSICIAN ASSISTANT

## 2019-11-12 ENCOUNTER — OFFICE VISIT (OUTPATIENT)
Dept: GERIATRIC MEDICINE | Age: 84
End: 2019-11-12
Payer: MEDICARE

## 2019-11-12 DIAGNOSIS — R20.0 NUMBNESS AND TINGLING IN RIGHT HAND: ICD-10-CM

## 2019-11-12 DIAGNOSIS — M79.2 INTRACTABLE NEUROPATHIC PAIN OF HAND, RIGHT: ICD-10-CM

## 2019-11-12 DIAGNOSIS — R63.8 POOR FLUID INTAKE: ICD-10-CM

## 2019-11-12 DIAGNOSIS — R20.2 NUMBNESS AND TINGLING IN RIGHT HAND: ICD-10-CM

## 2019-11-12 DIAGNOSIS — Z86.19 HX OF SEPSIS: Primary | ICD-10-CM

## 2019-11-12 DIAGNOSIS — R63.0 POOR APPETITE: ICD-10-CM

## 2019-11-12 PROCEDURE — 99308 SBSQ NF CARE LOW MDM 20: CPT | Performed by: PHYSICIAN ASSISTANT

## 2019-11-12 PROCEDURE — G8484 FLU IMMUNIZE NO ADMIN: HCPCS | Performed by: PHYSICIAN ASSISTANT

## 2019-11-12 PROCEDURE — 1123F ACP DISCUSS/DSCN MKR DOCD: CPT | Performed by: PHYSICIAN ASSISTANT

## 2019-11-22 ENCOUNTER — OFFICE VISIT (OUTPATIENT)
Dept: GERIATRIC MEDICINE | Age: 84
End: 2019-11-22
Payer: MEDICARE

## 2019-11-22 DIAGNOSIS — R20.2 NUMBNESS AND TINGLING IN RIGHT HAND: Primary | ICD-10-CM

## 2019-11-22 DIAGNOSIS — F32.A MILD DEPRESSION: ICD-10-CM

## 2019-11-22 DIAGNOSIS — R20.0 NUMBNESS AND TINGLING IN RIGHT HAND: Primary | ICD-10-CM

## 2019-11-22 PROCEDURE — G8484 FLU IMMUNIZE NO ADMIN: HCPCS | Performed by: PHYSICIAN ASSISTANT

## 2019-11-22 PROCEDURE — 1123F ACP DISCUSS/DSCN MKR DOCD: CPT | Performed by: PHYSICIAN ASSISTANT

## 2019-11-22 PROCEDURE — 99308 SBSQ NF CARE LOW MDM 20: CPT | Performed by: PHYSICIAN ASSISTANT

## 2019-11-27 LAB
ANION GAP SERPL CALCULATED.3IONS-SCNC: 10 MEQ/L (ref 9–15)
BASOPHILS ABSOLUTE: 0.2 K/UL (ref 0–0.2)
BASOPHILS RELATIVE PERCENT: 1.3 %
BUN BLDV-MCNC: 24 MG/DL (ref 8–23)
CALCIUM SERPL-MCNC: 9.5 MG/DL (ref 8.5–9.9)
CHLORIDE BLD-SCNC: 105 MEQ/L (ref 95–107)
CO2: 23 MEQ/L (ref 20–31)
CREAT SERPL-MCNC: 1.47 MG/DL (ref 0.5–0.9)
EOSINOPHILS ABSOLUTE: 0.8 K/UL (ref 0–0.7)
EOSINOPHILS RELATIVE PERCENT: 6.3 %
GFR AFRICAN AMERICAN: 40.1
GFR NON-AFRICAN AMERICAN: 33.2
GLUCOSE BLD-MCNC: 63 MG/DL (ref 70–99)
HCT VFR BLD CALC: 32 % (ref 37–47)
HEMOGLOBIN: 10.2 G/DL (ref 12–16)
LYMPHOCYTES ABSOLUTE: 2.1 K/UL (ref 1–4.8)
LYMPHOCYTES RELATIVE PERCENT: 16.6 %
MCH RBC QN AUTO: 28 PG (ref 27–31.3)
MCHC RBC AUTO-ENTMCNC: 31.8 % (ref 33–37)
MCV RBC AUTO: 88.1 FL (ref 82–100)
MONOCYTES ABSOLUTE: 1.2 K/UL (ref 0.2–0.8)
MONOCYTES RELATIVE PERCENT: 9.2 %
NEUTROPHILS ABSOLUTE: 8.4 K/UL (ref 1.4–6.5)
NEUTROPHILS RELATIVE PERCENT: 66.6 %
PDW BLD-RTO: 15.4 % (ref 11.5–14.5)
PLATELET # BLD: 335 K/UL (ref 130–400)
POTASSIUM SERPL-SCNC: 4.1 MEQ/L (ref 3.4–4.9)
RBC # BLD: 3.63 M/UL (ref 4.2–5.4)
SODIUM BLD-SCNC: 138 MEQ/L (ref 135–144)
WBC # BLD: 12.7 K/UL (ref 4.8–10.8)

## 2019-11-29 ENCOUNTER — OFFICE VISIT (OUTPATIENT)
Dept: GERIATRIC MEDICINE | Age: 84
End: 2019-11-29
Payer: MEDICARE

## 2019-11-29 DIAGNOSIS — E11.9 DIABETES MELLITUS WITHOUT COMPLICATION (HCC): Primary | ICD-10-CM

## 2019-11-29 DIAGNOSIS — R30.0 DYSURIA: ICD-10-CM

## 2019-11-29 DIAGNOSIS — I10 ESSENTIAL HYPERTENSION: ICD-10-CM

## 2019-11-29 PROCEDURE — G8484 FLU IMMUNIZE NO ADMIN: HCPCS | Performed by: INTERNAL MEDICINE

## 2019-11-29 PROCEDURE — 1123F ACP DISCUSS/DSCN MKR DOCD: CPT | Performed by: INTERNAL MEDICINE

## 2019-11-29 PROCEDURE — 99308 SBSQ NF CARE LOW MDM 20: CPT | Performed by: INTERNAL MEDICINE

## 2019-12-02 LAB
AVERAGE GLUCOSE: 134
CHOLESTEROL, TOTAL: 93 MG/DL
CHOLESTEROL/HDL RATIO: 0.6
HBA1C MFR BLD: 6.3 %
HDLC SERPL-MCNC: 24 MG/DL (ref 35–70)
LDL CHOLESTEROL CALCULATED: 14 MG/DL (ref 0–160)
TRIGL SERPL-MCNC: 277 MG/DL
VLDLC SERPL CALC-MCNC: 55 MG/DL

## 2019-12-09 ENCOUNTER — OFFICE VISIT (OUTPATIENT)
Dept: GERIATRIC MEDICINE | Age: 84
End: 2019-12-09
Payer: MEDICARE

## 2019-12-09 DIAGNOSIS — R20.2 NUMBNESS AND TINGLING IN RIGHT HAND: Primary | ICD-10-CM

## 2019-12-09 DIAGNOSIS — R20.0 NUMBNESS AND TINGLING IN RIGHT HAND: Primary | ICD-10-CM

## 2019-12-09 DIAGNOSIS — Z87.440 RECENT URINARY TRACT INFECTION: ICD-10-CM

## 2019-12-09 PROCEDURE — 99308 SBSQ NF CARE LOW MDM 20: CPT | Performed by: PHYSICIAN ASSISTANT

## 2019-12-09 PROCEDURE — 1123F ACP DISCUSS/DSCN MKR DOCD: CPT | Performed by: PHYSICIAN ASSISTANT

## 2019-12-09 PROCEDURE — G8484 FLU IMMUNIZE NO ADMIN: HCPCS | Performed by: PHYSICIAN ASSISTANT

## 2019-12-13 ENCOUNTER — OFFICE VISIT (OUTPATIENT)
Dept: GERIATRIC MEDICINE | Age: 84
End: 2019-12-13
Payer: MEDICARE

## 2019-12-13 PROCEDURE — 99308 SBSQ NF CARE LOW MDM 20: CPT | Performed by: PHYSICIAN ASSISTANT

## 2019-12-13 PROCEDURE — 1123F ACP DISCUSS/DSCN MKR DOCD: CPT | Performed by: PHYSICIAN ASSISTANT

## 2019-12-13 PROCEDURE — G8484 FLU IMMUNIZE NO ADMIN: HCPCS | Performed by: PHYSICIAN ASSISTANT

## 2019-12-16 ENCOUNTER — OFFICE VISIT (OUTPATIENT)
Dept: GERIATRIC MEDICINE | Age: 84
End: 2019-12-16
Payer: MEDICARE

## 2019-12-16 DIAGNOSIS — R20.0 NUMBNESS AND TINGLING IN RIGHT HAND: Primary | ICD-10-CM

## 2019-12-16 DIAGNOSIS — R20.2 NUMBNESS AND TINGLING IN RIGHT HAND: Primary | ICD-10-CM

## 2019-12-16 PROCEDURE — G8484 FLU IMMUNIZE NO ADMIN: HCPCS | Performed by: PHYSICIAN ASSISTANT

## 2019-12-16 PROCEDURE — 1123F ACP DISCUSS/DSCN MKR DOCD: CPT | Performed by: PHYSICIAN ASSISTANT

## 2019-12-16 PROCEDURE — 99308 SBSQ NF CARE LOW MDM 20: CPT | Performed by: PHYSICIAN ASSISTANT

## 2019-12-23 ENCOUNTER — OFFICE VISIT (OUTPATIENT)
Dept: GERIATRIC MEDICINE | Age: 84
End: 2019-12-23
Payer: MEDICARE

## 2019-12-23 PROCEDURE — G8484 FLU IMMUNIZE NO ADMIN: HCPCS | Performed by: PHYSICIAN ASSISTANT

## 2019-12-23 PROCEDURE — 99308 SBSQ NF CARE LOW MDM 20: CPT | Performed by: PHYSICIAN ASSISTANT

## 2019-12-23 PROCEDURE — 1123F ACP DISCUSS/DSCN MKR DOCD: CPT | Performed by: PHYSICIAN ASSISTANT

## 2020-01-02 ENCOUNTER — OFFICE VISIT (OUTPATIENT)
Dept: GERIATRIC MEDICINE | Age: 85
End: 2020-01-02
Payer: MEDICARE

## 2020-01-02 PROCEDURE — 1123F ACP DISCUSS/DSCN MKR DOCD: CPT | Performed by: INTERNAL MEDICINE

## 2020-01-02 PROCEDURE — 99309 SBSQ NF CARE MODERATE MDM 30: CPT | Performed by: INTERNAL MEDICINE

## 2020-01-02 PROCEDURE — G8484 FLU IMMUNIZE NO ADMIN: HCPCS | Performed by: INTERNAL MEDICINE

## 2020-01-06 RX ORDER — OXYCODONE HYDROCHLORIDE AND ACETAMINOPHEN 5; 325 MG/1; MG/1
1 TABLET ORAL 2 TIMES DAILY
Qty: 60 TABLET | Refills: 0 | Status: SHIPPED | OUTPATIENT
Start: 2020-01-06 | End: 2020-02-03 | Stop reason: SDUPTHER

## 2020-01-10 NOTE — PROGRESS NOTES
PATIENTCrebabita Raman : 1926 DOS: 2020     [facility]    Seen for a routine monthly visit for her neuropathy, peripheral vascular disease, diabetes. SUBJECTIVE:  This very pleasant 12-year-old woman was seated up in her wheelchair. The patient is complaining of ongoing neuropathic pain in her hands. The patient does have follow up pending with Ortho for possibility of injection/nerve block. The patient has not had any new nausea, vomiting, no recent change in her bowel or bladder habit. Blood sugars are reviewed with nursing staff. The patient has been pain free in other respects. REVIEW OF SYSTEMS:  Denied chest pain, palpitations, has ongoing pain in her hands, intermittent phantom pain in her lower extremities. No new symptomatic hypoglycemia. OBJECTIVE:  She appeared clinically stable. Pupils are small, but they are reactive. Oral mucosa is moist.  Chest showed no crackles, no wheezing. Cardiovascular exam showed a regular rate. Abdomen was soft, nontender. Extremities show bilateral amputations. ASSESSMENT AND PLAN:  1. Neuropathy, may benefit from a nerve block versus titration of medications, following up with specialty services. 2.   Peripheral vascular disease. No evidence of new end-organ disease. 3.   Diabetes. No new symptomatic hypoglycemia. We will monitor clinically.         Electronically Signed By: Roseanne Hooker M.D. on 2020 16:24:36  ______________________________  Roseanne Hooker M.D.  [id number]  D: 2020 19:06:06  T: 2020 00:23:19    cc: - [facility]

## 2020-01-14 NOTE — PROGRESS NOTES
PATIENT: Shanna Yu : 1926 DOS: 2019     [facility]    HISTORY OF PRESENT ILLNESS: The patient is seen as [facility] patient for acute visit regarding her follow up with ortho on 2020. The patient is still complaining of having pain in her right wrist and 3rd, 4th, and 5th digits of right hand. Orthopedic physician suggests this is consistent with carpal tunnel. Patient not having significant improvement since injection. I am awaiting follow up visit notes from orthopedic physician for any recommendations for patient. Patient is wearing a brace at this time, which seems to help modestly. We will continue to look for ways to use medications to support patient's comfort level. The patient does seem at first glance however to be improved does not seem to be as much agitation or discomfort as she was on first encountering this problem. We will follow up once notes are available. We will consider increasing p.r.n. narcotic pain medication to attempt coverage. We will suggest pain management follow up if no success. Otherwise patient offers no new complaints. MEDICATIONS: Reviewed. ALLERGIES: Reviewed. REVIEW OF SYSTEMS: CONSTITUTIONAL: Afebrile. No new MVD. Denies any increased weakness, fatigue. The patient since coming back from hospital with sepsis showing significant improvement in overall conditioning. CARDIAC: No chest pain, palpitations, orthopnea, RODRIGUEZ or PND. PULMONARY: No new cough, SOB, POI or wheezing. Some mild shortness of breath at baseline. GI: No new complaints. : No new complaints. MSK: Right hand/wrist pain and numbness/tingling reported that is ongoing over the past four or eight weeks. PSYCHIATRIC: The patient's mood is overall stable. No increased stress, anxiety, depression, some mild agitation due to condition. Remaining 14-point ROS unremarkable. PHYSICAL EXAMINATION: Vital signs reviewed. See [facility]. GENERAL: Good hygiene, flat affect.  No acute distress. Seen in electric wheelchair in common area today. Pupils equal, round, reactive to light. MMM. No erythema, exudate. CARDIOPULMONARY: Regular rate, 1 or 2 ectopic beats noted on auscultation. Capillary refill intact within normal limits. PULMONARY: LSCTA. ABDOMEN: Normoactive bowel sounds. SKIN: The patient's right hand and wrist have clear dry skin. No redness, erythema. No presence of increased PIP or DIP joint instability or tenderness, pain and swelling. No evidence of Raynaud's. MENTAL STATUS: The patient is awake, alert, oriented 3/3. ASSESSMENT AND PLAN:  Right hand wrist carpal tunnel - will follow up with ortho recommendations when notes arrive to office. We will continue p.r.n. pain medications as needed as well as narcotic pain meds scheduled. We will attempt possible dose adjustment if patient elects to attempt coverage for her discomfort. Continue utilizing brace and perform ADLs as best as possible to mitigate weakness in affected joint.          Electronically Signed By: Blayne Carrillo PA-C on 01/14/2020 10:36:52  ______________________________  LADONNA Ramirez/[id number]  D: 01/12/2020 81:01:81  T: 01/12/2020 22:18:25    cc: - [employer]

## 2020-01-14 NOTE — PROGRESS NOTES
PATIENT: Angeles Lopez : 1926 DOS: 2019     [facility]    HISTORY OF PRESENT ILLNESS: The patient is being seen today for acute visit regarding follow up for right wrist injection for ?carpal tunnel. The patient saw orthopedic surgery service as an outpatient, did receive an injection in her right wrist. The patient states that it is currently not doing much at this time, although there is a mild improvement. The patient is wearing a brace at this time, an uncertain if what is improving, the patient's symptoms with the brace or injection. Educated the patient that sometimes the injections take a few days/up to week to start feeling like they are actively engaging the problem. The patient understood. The patient and I discussed further workup if necessary. The patient does not want extensive workup/surgery; however, may be willing to attempt another injection or similar minor procedure if necessary. The patient's only concern is to be able to do some of her past time, such as crocheting, and general day-to-day activities. We will continue following up with the patient over the next several weeks to attempt establishing a plan to mitigate symptoms. The patient states otherwise she is doing well. No acute complaints. MEDICATIONS: Reviewed. ALLERGIES: Reviewed. REVIEW OF SYSTEMS: Constitutional: Afebrile. No new nausea, vomiting, weakness, fatigue, headache, or acute weight changes. Cardiac: Denies any CP, orthopnea, RODRIGUEZ, or palpitations. Pulmonary: No new cough, SOB, POI, or wheezing. Does have some coarse breath sounds secondary to likely laryngeal irritation; baseline. Denies any GI or  complaints. Psychiatric: The patient's mood is overall stable. Some minor distress over right wrist. No depression, anxiety reported. Remaining 14-point ROS unremarkable. PHYSICAL EXAMINATION: Vital signs reviewed, see [address]. General: The patient displays good hygiene, subdued affect.  No acute

## 2020-01-17 ENCOUNTER — OFFICE VISIT (OUTPATIENT)
Dept: GERIATRIC MEDICINE | Age: 85
End: 2020-01-17
Payer: MEDICARE

## 2020-01-17 PROCEDURE — 99308 SBSQ NF CARE LOW MDM 20: CPT | Performed by: PHYSICIAN ASSISTANT

## 2020-01-17 PROCEDURE — 1123F ACP DISCUSS/DSCN MKR DOCD: CPT | Performed by: PHYSICIAN ASSISTANT

## 2020-01-17 PROCEDURE — G8484 FLU IMMUNIZE NO ADMIN: HCPCS | Performed by: PHYSICIAN ASSISTANT

## 2020-02-03 RX ORDER — OXYCODONE HYDROCHLORIDE AND ACETAMINOPHEN 5; 325 MG/1; MG/1
1 TABLET ORAL 2 TIMES DAILY
Qty: 180 TABLET | Refills: 0 | Status: SHIPPED | OUTPATIENT
Start: 2020-02-03 | End: 2020-03-26 | Stop reason: SDUPTHER

## 2020-02-12 ENCOUNTER — OFFICE VISIT (OUTPATIENT)
Dept: GERIATRIC MEDICINE | Age: 85
End: 2020-02-12
Payer: MEDICARE

## 2020-02-12 PROCEDURE — 1123F ACP DISCUSS/DSCN MKR DOCD: CPT | Performed by: PHYSICIAN ASSISTANT

## 2020-02-12 PROCEDURE — G8484 FLU IMMUNIZE NO ADMIN: HCPCS | Performed by: PHYSICIAN ASSISTANT

## 2020-02-12 PROCEDURE — 99308 SBSQ NF CARE LOW MDM 20: CPT | Performed by: PHYSICIAN ASSISTANT

## 2020-02-13 LAB
BUN BLDV-MCNC: 24 MG/DL
CALCIUM SERPL-MCNC: 9.6 MG/DL
CHLORIDE BLD-SCNC: 110 MMOL/L
CO2: 26 MMOL/L
CREAT SERPL-MCNC: 1.6 MG/DL
GFR CALCULATED: NORMAL
GLUCOSE BLD-MCNC: 75 MG/DL
POTASSIUM SERPL-SCNC: 4.1 MMOL/L
SODIUM BLD-SCNC: 144 MMOL/L

## 2020-02-19 ENCOUNTER — OFFICE VISIT (OUTPATIENT)
Dept: GERIATRIC MEDICINE | Age: 85
End: 2020-02-19
Payer: MEDICARE

## 2020-02-19 PROCEDURE — G8484 FLU IMMUNIZE NO ADMIN: HCPCS | Performed by: PHYSICIAN ASSISTANT

## 2020-02-19 PROCEDURE — 99308 SBSQ NF CARE LOW MDM 20: CPT | Performed by: PHYSICIAN ASSISTANT

## 2020-02-19 PROCEDURE — 1123F ACP DISCUSS/DSCN MKR DOCD: CPT | Performed by: PHYSICIAN ASSISTANT

## 2020-02-20 LAB
BUN BLDV-MCNC: 28 MG/DL
CALCIUM SERPL-MCNC: 9.4 MG/DL
CHLORIDE BLD-SCNC: 107 MMOL/L
CO2: 22 MMOL/L
CREAT SERPL-MCNC: 1.7 MG/DL
GFR CALCULATED: 28
GLUCOSE BLD-MCNC: 152 MG/DL
POTASSIUM SERPL-SCNC: 4.3 MMOL/L
SODIUM BLD-SCNC: 140 MMOL/L

## 2020-02-28 ENCOUNTER — OFFICE VISIT (OUTPATIENT)
Dept: GERIATRIC MEDICINE | Age: 85
End: 2020-02-28
Payer: MEDICARE

## 2020-02-28 PROCEDURE — G8484 FLU IMMUNIZE NO ADMIN: HCPCS | Performed by: PHYSICIAN ASSISTANT

## 2020-02-28 PROCEDURE — 99309 SBSQ NF CARE MODERATE MDM 30: CPT | Performed by: PHYSICIAN ASSISTANT

## 2020-02-28 PROCEDURE — 1123F ACP DISCUSS/DSCN MKR DOCD: CPT | Performed by: PHYSICIAN ASSISTANT

## 2020-03-03 NOTE — PROGRESS NOTES
PATIENT: Jennifer Pa : 1926 DOS: 2020     [facility]      HPI: The patient is being seen today for recent right lower leg stump amputation site blister and recent onset tremor over the past six months. The patient states that she still has a small blister area that has broken, currently treated with just a Band-Aid over the area. Reassured patient that it should heal, to increase protein intake, we will add zinc supplementation as well with some vitamin C for overall health benefit and increased wound healing. The patient also complains of a new onset tremor, appears to be essential tremor per clinical exam. The patient has a slight minor head tremor and an intention tremor when utilizing utensils or knitting throughout the day. The patient states it is minor, but it does bother her. The patient does seem to want intervention. However, when questioned, she does not want to see neurology or go out for a visit, did say that last resort she would go. At this time discussed adding primidone to her care plan daily and to reassess. The patient is ambulatory in an electric wheelchair. She does not ambulate on her own, is a bilateral lower leg amputee. Her risk of fall is significantly reduced. We will start the patient on low dose, 50 mg p.o. q.d. and assess primidone and phenobarbital level after next week. We will assess treatment efficacy as well at available visit next week. Otherwise, patient states no further complaints. The patient's mood is overall stable. Denies any constitutional symptoms. No fatigue, weakness, diaphoresis or weight changes, compared to last visit. Denies any chest pain, palpitations, orthopnea. Denies any POI, SOB, cough, or wheezing. No GI or  complaints. The patient has been maintaining overall strength she states in her upper periphery. Remaining 14-point ROS unremarkable. MEDICATIONS: Reviewed. ALLERGIES: Reviewed.     REVIEW OF SYSTEMS: See HPI.    PHYSICAL EXAMINATION: Vital signs reviewed on site today within normal limits. GENERAL: Overall unchanged compared to last visit. Good hygiene, bright affect, no acute distress. HEENT: PERRLA. MMM, no erythema or exudate. CARDIAC: Regular rate and rhythm. No significant lower leg edema at level of amputation bilaterally. PULMONARY: LSCTA with some minor wheezing, which clears with a purposeful cough. ABDOMINAL: Normoactive bowel sounds. Soft and nontender abdomen, all four quadrants. MENTAL STATUS: The patient is awake, alert, oriented, 3/3. NEUROLOGICAL: The patient has a slight tremor of her head that is side to side, bowing in nature with a purposeful tremor in her bilateral hands that is minor on observation. ASSESSMENT AND PLAN:   1. Essential tremor - we will add primidone 50 mg p.o. q.d. times one month and evaluate primidone and phenobarbital levels later this week. We will assess efficacy at next available visit. 2.  Minor right stump wound - continue current management. We will add zinc and vitamin C q.d. for the next month. Encourage protein intake and overall good nutritional status.          Electronically Signed By: Araceli Flores PA-C on 03/01/2020 15:06:11  ______________________________  Araceli Flores PA-C  [id number]  D: 02/28/2020 17:46:26  T: 02/28/2020 23:21:24    cc: - [employer]

## 2020-03-04 NOTE — PROGRESS NOTES
PATIENT: Jess Trevino : 1926 DOS: 2020     [facility]    HPI: The patient is being seen today for a monthly visit for some acute routine issues including acute right wrist pain over the past few months, CKD, and history of lower leg DVTs. The patient is currently on anticoagulation. No new issues at this time. No new bleeding or bruising, reportedlyNo new trauma to head and neck or other peripheral areas. No abdominal trauma. The patient's renal function has been stable at baseline. GFR approximately 30-40 at time of visit. We will continue monitoring routinely. Right wrist pain has been ongoing study. The patient is learning to work around it and is still participating in daily activities. We will add some increase in gabapentin and check renal function as well, maintain. The patient's renal function is appropriate. We will assess possible switch to Lyrica. The patient offers no further complaints. Overall, patient is stable besides her right wrist pain. MEDICATIONS: Reviewed. ALLERGIES: Reviewed. REVIEW OF SYSTEMS: CONSTITUTIONAL: Afebrile. No known NVD, weakness, fatigue beyond her baseline. HEENT: Denies headaches, confusion, lightheadedness, or LOC. CARDIAC: Denies chest pain, palpitations, orthopnea, RODRIGUEZ. PULMONARY: No new SOB, POI, wheezing, or cough reported. HEME/SKIN: Denies any acute complaints. GI: No new acute complaints. : No acute complaints. MSK: Right-handed numbness, tingling in 3rd/4th/5th digits as well as within wrist. Hand strength affected and ADLs affected moderately at this point. This  is mostly unchanged compared to prior visits. PSYCHIATRIC: The patient's mood is overall stable. No acute complaints. Remaining 14-point ROS unremarkable. PHYSICAL EXAMINATION: Vital signs reviewed on site today, within normal limits. See [facility]. GENERAL: The patient appears to have good hygiene, overall bright affect, no acute distress. Seated in wheelchair today.

## 2020-03-06 LAB
BUN BLDV-MCNC: 26 MG/DL
CALCIUM SERPL-MCNC: 8.8 MG/DL
CHLORIDE BLD-SCNC: 109 MMOL/L
CO2: 23 MMOL/L
CREAT SERPL-MCNC: 1.8 MG/DL
GFR CALCULATED: 26
GLUCOSE BLD-MCNC: 111 MG/DL
POTASSIUM SERPL-SCNC: 4.3 MMOL/L
SODIUM BLD-SCNC: 141 MMOL/L

## 2020-03-09 ENCOUNTER — OFFICE VISIT (OUTPATIENT)
Dept: GERIATRIC MEDICINE | Age: 85
End: 2020-03-09
Payer: MEDICARE

## 2020-03-09 PROCEDURE — 99309 SBSQ NF CARE MODERATE MDM 30: CPT | Performed by: PHYSICIAN ASSISTANT

## 2020-03-09 PROCEDURE — G8484 FLU IMMUNIZE NO ADMIN: HCPCS | Performed by: PHYSICIAN ASSISTANT

## 2020-03-09 PROCEDURE — 1123F ACP DISCUSS/DSCN MKR DOCD: CPT | Performed by: PHYSICIAN ASSISTANT

## 2020-03-12 NOTE — PROGRESS NOTES
PATIENT: Latia Buck : 1926 DOS: 2020     [facility]    HPI:  Patient is being seen today for lower leg amputation, prosthetic issues. Patient has some malfunctioning of bilateral lower leg prosthetics. On her right leg, there was an issue supposedly with the adherence of the apparatus to her right lower leg. There is a small scabbing wound on right stump area that is healing fairly well, being monitored by wound care nurse routinely. Patient states that attempting to place the leg is ineffective with most staff. When attempting to place the leg appendage today, there was success; however, patient was not monitored in a sit to stand position. There certainly may be some further areas of evaluation that need to be addressed, however, it did click into place. Left lower leg appendage, there is an issue with the appendage locking in via the mechanism provided. Patient states that this disallows for safe standing and sit to stand practices. Patient is anticoagulated currently and is a significant fall risk at her age and her body habitus, strength, and stability levels. Patient likely in need of further evaluation and provision of new prosthetic device should the need arise. Otherwise, patient denied any acute complaints today. We did discuss her recent trial on primidone, which she did not enjoy. We discontinued that earlier prior week. Told patient that we can follow up with Neurology if further essential tremor issues evolve. Patient is not immediately concerned; however, she stated that she will talk to me later if there is a desire to follow up with Neurology. MEDICATIONS: Reviewed. ALLERGIES: Reviewed. ROS: CONSTITUTIONAL: Afebrile. No known NVD, weakness, or fatigue beyond her baseline. HEENT: Denies headaches, confusion, lightheadedness, or LOC. CARDIAC: Denies chest pain, palpitations, orthopnea, RODRIGUEZ. PULMONARY: No new SOB, POI, wheezing, or cough reported.  HEME/SKIN:

## 2020-03-16 ENCOUNTER — OFFICE VISIT (OUTPATIENT)
Dept: GERIATRIC MEDICINE | Age: 85
End: 2020-03-16
Payer: MEDICARE

## 2020-03-16 PROCEDURE — 1123F ACP DISCUSS/DSCN MKR DOCD: CPT | Performed by: PHYSICIAN ASSISTANT

## 2020-03-16 PROCEDURE — 99308 SBSQ NF CARE LOW MDM 20: CPT | Performed by: PHYSICIAN ASSISTANT

## 2020-03-16 PROCEDURE — G8484 FLU IMMUNIZE NO ADMIN: HCPCS | Performed by: PHYSICIAN ASSISTANT

## 2020-03-23 NOTE — PROGRESS NOTES
bowel sounds. Soft and nontender abdomen, all four quadrants. MENTAL STATUS: The patient is awake, alert, oriented, 3/3. NEUROLOGICAL: Mild evident essential tremor at rest, minor resting bilat hand tremor. MSK: Placed right lower artificial appendage; 3/5 strength with attachment of BLE periphery. ASSESSMENT AND PLAN:  1. Essential Tremor - no new changes. Patient denies further workup at this time. We will continue to monitor. 2.  Bilateral BKA Prosthesis troubleshooting - awaiting further eval and possible replacement of equipment. Patient currently with assistance, capable of transferring when needed. No new acute issue.          Electronically Signed By: Miguel Rodriguez PA-C on 03/20/2020 19:04:16  ______________________________  LADONNA Pereyra/[id number]  D: 03/16/2020 18:49:21  T: 03/17/2020 15:01:15    cc: - [employer]

## 2020-03-25 ENCOUNTER — VIRTUAL VISIT (OUTPATIENT)
Dept: GERIATRIC MEDICINE | Age: 85
End: 2020-03-25
Payer: MEDICARE

## 2020-03-25 PROCEDURE — 99308 SBSQ NF CARE LOW MDM 20: CPT | Performed by: PHYSICIAN ASSISTANT

## 2020-03-25 NOTE — PROGRESS NOTES
by mouth 2 times daily for 90 days.   VIVI Aguilera CNP   ELIQUIS 2.5 MG TABS tablet Take 2.5 mg by mouth 2 times daily  Historical Provider, MD   gabapentin (NEURONTIN) 100 MG capsule Take 2 capsules by mouth nightly  VIVI Aguilera CNP   gabapentin (NEURONTIN) 100 MG capsule Take 1 capsule by mouth daily  VIVI Aguilera CNP   insulin glargine (LANTUS SOLOSTAR) 100 UNIT/ML injection pen Inject 40 Units into the skin nightly  VIVI Aguilera CNP   insulin aspart (NOVOLOG FLEXPEN) 100 UNIT/ML injection pen Inject 9 Units into the skin daily (with breakfast)  VIVI Aguilera CNP   insulin aspart (NOVOLOG FLEXPEN) 100 UNIT/ML injection pen Inject 9 Units into the skin Daily with lunch  VIVI Aguilera CNP   insulin aspart (NOVOLOG FLEXPEN) 100 UNIT/ML injection pen Inject 7 Units into the skin Daily with supper  VIVI Aguilera CNP   acetaminophen (TYLENOL) 500 MG tablet Take 500 mg by mouth 2 times daily  Historical Provider, MD   amLODIPine (NORVASC) 5 MG tablet Take 5 mg by mouth daily  Historical Provider, MD   polyvinyl alcohol (LIQUIFILM TEARS) 1.4 % ophthalmic solution Place 2 drops into both eyes 4 times daily as needed  Historical Provider, MD   aspirin 81 MG tablet Take 81 mg by mouth daily  Historical Provider, MD   atorvastatin (LIPITOR) 40 MG tablet Take 40 mg by mouth daily  Historical Provider, MD   Cranberry (AZO CRANBERRY GUMMIES) 500 MG CHEW Take by mouth 2 times daily  Historical Provider, MD   letrozole (FEMARA) 2.5 MG tablet Take 2.5 mg by mouth daily  Historical Provider, MD   levothyroxine (LEVOTHROID) 125 MCG tablet Take 125 mcg by mouth Daily EVERY SUN, TUE, THUR, SAT  Historical Provider, MD   lisinopril (PRINIVIL;ZESTRIL) 40 MG tablet Take 40 mg by mouth daily  Historical Provider, MD   loperamide (IMODIUM) 2 MG capsule Take 2 mg by mouth every 6 hours as needed for Diarrhea  Historical Provider, MD   metoprolol tartrate (LOPRESSOR) 25 MG tablet Take 25 mg by mouth 2 times daily  Historical Provider, MD   omeprazole (PRILOSEC) 20 MG delayed release capsule Take 20 mg by mouth daily  Historical Provider, MD   ondansetron (ZOFRAN) 4 MG tablet Take 4 mg by mouth every 8 hours as needed for Nausea or Vomiting  Historical Provider, MD   sodium chloride (OCEAN, BABY AYR) 0.65 % nasal spray 2 sprays by Nasal route as needed for Congestion  Historical Provider, MD   Cholecalciferol (VITAMIN D3) 1000 units TABS Take 2,000 Units by mouth daily  Historical Provider, MD       Social History     Tobacco Use    Smoking status: Unknown If Ever Smoked    Smokeless tobacco: Never Used   Substance Use Topics    Alcohol use: No    Drug use: Not on file        Allergies   Allergen Reactions    Pcn [Penicillins]    ,   Past Medical History:   Diagnosis Date    CAD (coronary artery disease)     Combined hyperlipidemia     [facility] 2[facility]     [facility]) [facility]     Hypertension     Hypothyroidism     Phantom limb syndrome ([facility] 9/23/2017    PVD (peripheral vascular disease) ([facility]    ,   Past Surgical History:   Procedure Laterality Date    ABOVE KNEE AMPUTATION      left    APPENDECTOMY      CHOLECYSTECTOMY      CORONARY ANGIOPLASTY WITH STENT PLACEMENT      LEG AMPUTATION BELOW KNEE      right   ,   Social History     Tobacco Use    Smoking status: Unknown If Ever Smoked    Smokeless tobacco: Never Used   Substance Use Topics    Alcohol use: No    Drug use: Not on file   , No family history on file.,   Immunization History   Administered Date(s) Administered    Influenza Virus Vaccine 10/13/2016       PHYSICAL EXAMINATION:  [ INSTRUCTIONS:  \"[x]\" Indicates a positive item  \"[]\" Indicates a negative item  -- DELETE ALL ITEMS NOT EXAMINED]  [x] Alert  [x] Oriented to person/place/time    [x] No apparent distress  [] Toxic appearing    [] Face flushed appearing [] Sclera clear  [] Lips are cyanotic      [x] Breathing for in-person clinic visit.

## 2020-03-26 RX ORDER — OXYCODONE HYDROCHLORIDE AND ACETAMINOPHEN 5; 325 MG/1; MG/1
1 TABLET ORAL 2 TIMES DAILY
Qty: 60 TABLET | Refills: 0 | Status: SHIPPED | OUTPATIENT
Start: 2020-03-26 | End: 2020-04-25

## 2020-03-30 ENCOUNTER — VIRTUAL VISIT (OUTPATIENT)
Dept: GERIATRIC MEDICINE | Age: 85
End: 2020-03-30
Payer: MEDICARE

## 2020-03-30 PROCEDURE — 1123F ACP DISCUSS/DSCN MKR DOCD: CPT | Performed by: INTERNAL MEDICINE

## 2020-03-30 PROCEDURE — 99308 SBSQ NF CARE LOW MDM 20: CPT | Performed by: INTERNAL MEDICINE

## 2020-04-15 NOTE — PROGRESS NOTES
[address] [facility]    [address], [address]    Phone: [phone]  Fax:  [phone]       PATIENT: Tiarra Rodríguez : 1926 DOS:      [facility]    HPI: Vital signs reviewed on site today, within normal limits. Patient was seen today for left hand, wrist, with 3rd, 4th, and 5th digit numbness and tingling. The patient does have a longstanding history of bilateral upper and lower neuropathy. States that her symptoms have not worsened. However, they have not seen them improve. The brace does help with her day-to-day tasks. Patient does routinely utilize her croAvenidating kit for general past-time activity. She has been able to continue utilizing her tools and has been keeping pace with her typical hobbies. Does not want to do any surgical intervention. Currently does not want to be referred to neurology at this time. Has seen ortho with limited success. Discussed investigating decreasing gabapentin and possibly changing to Lyrica as a trial. Depending on kidney function, we will evaluate BNP p.r.n. Patient currently doing well otherwise. Denies any fever, chills, nausea, vomiting, weakness, fatigue, acute weight changes. Appetite is within normal limits. No new CP, palpitations, orthopnea, RODRIGUEZ, leg edema. Has bilateral lower leg BKA. No new cough, SOB, POI, or wheezing reported. No new GI or  complaints. Denies any psychiatric symptoms. We will follow the patient in next visit to facility to discuss possible medication changes for neuropathy. MEDICATIONS: Reviewed. ALLERGIES: Reviewed. REVIEW OF SYSTEMS: See HPI. PHYSICAL EXAMINATION: GENERAL: Good hygiene, bright affect, no acute distress. Seen in hallway in private area, seated in wc. HEENT: PERRLA. MMM, no erythema or exudate. CARDIAC: Regular rate and rhythm. No significant lower leg edema at level of amputation bilaterally.  PULMONARY: LSCTA with some minor wheezing, which clears with a

## 2020-04-22 ENCOUNTER — VIRTUAL VISIT (OUTPATIENT)
Dept: GERIATRIC MEDICINE | Age: 85
End: 2020-04-22
Payer: MEDICARE

## 2020-04-22 PROCEDURE — 99308 SBSQ NF CARE LOW MDM 20: CPT | Performed by: PHYSICIAN ASSISTANT

## 2020-04-22 PROCEDURE — 1123F ACP DISCUSS/DSCN MKR DOCD: CPT | Performed by: PHYSICIAN ASSISTANT

## 2020-04-22 ASSESSMENT — ENCOUNTER SYMPTOMS
EYES NEGATIVE: 1
RESPIRATORY NEGATIVE: 1
GASTROINTESTINAL NEGATIVE: 1

## 2020-04-23 NOTE — PROGRESS NOTES
gabapentin (NEURONTIN) 100 MG capsule Take 2 capsules by mouth nightly  VIVI Casey CNP   gabapentin (NEURONTIN) 100 MG capsule Take 1 capsule by mouth daily  VIVI Casey CNP   insulin glargine (LANTUS SOLOSTAR) 100 UNIT/ML injection pen Inject 40 Units into the skin nightly  VIVI Casey CNP   insulin aspart (NOVOLOG FLEXPEN) 100 UNIT/ML injection pen Inject 9 Units into the skin daily (with breakfast)  VIVI Casey CNP   insulin aspart (NOVOLOG FLEXPEN) 100 UNIT/ML injection pen Inject 9 Units into the skin Daily with lunch  VIVI aCsey CNP   insulin aspart (NOVOLOG FLEXPEN) 100 UNIT/ML injection pen Inject 7 Units into the skin Daily with supper  VIVI Casey CNP   acetaminophen (TYLENOL) 500 MG tablet Take 500 mg by mouth 2 times daily  Historical Provider, MD   amLODIPine (NORVASC) 5 MG tablet Take 5 mg by mouth daily  Historical Provider, MD   polyvinyl alcohol (LIQUIFILM TEARS) 1.4 % ophthalmic solution Place 2 drops into both eyes 4 times daily as needed  Historical Provider, MD   aspirin 81 MG tablet Take 81 mg by mouth daily  Historical Provider, MD   atorvastatin (LIPITOR) 40 MG tablet Take 40 mg by mouth daily  Historical Provider, MD   Cranberry (AZO CRANBERRY GUMMIES) 500 MG CHEW Take by mouth 2 times daily  Historical Provider, MD   letrozole (FEMARA) 2.5 MG tablet Take 2.5 mg by mouth daily  Historical Provider, MD   levothyroxine (LEVOTHROID) 125 MCG tablet Take 125 mcg by mouth Daily EVERY SUN, TUE, THUR, SAT  Historical Provider, MD   lisinopril (PRINIVIL;ZESTRIL) 40 MG tablet Take 40 mg by mouth daily  Historical Provider, MD   loperamide (IMODIUM) 2 MG capsule Take 2 mg by mouth every 6 hours as needed for Diarrhea  Historical Provider, MD   metoprolol tartrate (LOPRESSOR) 25 MG tablet Take 25 mg by mouth 2 times daily  Historical Provider, MD   omeprazole (PRILOSEC) 20 MG delayed release capsule Take 20 mg by mouth daily  Historical Provider, MD   ondansetron (ZOFRAN) 4 MG tablet Take 4 mg by mouth every 8 hours as needed for Nausea or Vomiting  Historical Provider, MD   sodium chloride (OCEAN, BABY AYR) 0.65 % nasal spray 2 sprays by Nasal route as needed for Congestion  Historical Provider, MD   Cholecalciferol (VITAMIN D3) 1000 units TABS Take 2,000 Units by mouth daily  Historical Provider, MD       Social History     Tobacco Use    Smoking status: Unknown If Ever Smoked    Smokeless tobacco: Never Used   Substance Use Topics    Alcohol use: No    Drug use: Not on file            PHYSICAL EXAMINATION:  [ INSTRUCTIONS:  \"[x]\" Indicates a positive item  \"[]\" Indicates a negative item  -- DELETE ALL ITEMS NOT EXAMINED]  Vital Signs: (As obtained by patient/caregiver or practitioner observation)    BP within normal limits. See [facility]. Constitutional: [x] Appears well-developed and well-nourished [x] No apparent distress      [] Abnormal-   Mental status  [x] Alert and awake  [x] Oriented to person/place/time []Able to follow commands      Eyes:  EOM    [x]  Normal  [] Abnormal-  Sclera  [x]  Normal  [] Abnormal -         Discharge [x]  None visible  [] Abnormal -    HENT:   [x] Normocephalic, atraumatic.   [] Abnormal   [x] Mouth/Throat: Mucous membranes are moist.     External Ears [x] Normal  [] Abnormal-     Neck: [x] No visualized mass     Pulmonary/Chest: [x] Respiratory effort normal.  [x] No visualized signs of difficulty breathing or respiratory distress        [] Abnormal-      Musculoskeletal:   [x] Normal gait with no signs of ataxia         [] Normal range of motion of neck        [] Abnormal-       Neurological:        [x] No Facial Asymmetry (Cranial nerve 7 motor function) (limited exam to video visit)          [x] No gaze palsy        [] Abnormal-         Skin:        [x] No significant exanthematous lesions or discoloration noted on facial skin         [] Abnormal-            Psychiatric:       [x] Normal Affect [x] No

## 2020-04-24 LAB
BUN BLDV-MCNC: 29 MG/DL
CALCIUM SERPL-MCNC: 9.2 MG/DL
CHLORIDE BLD-SCNC: 111 MMOL/L
CO2: 23 MMOL/L
CREAT SERPL-MCNC: 1.9 MG/DL
GFR CALCULATED: 25
GLUCOSE BLD-MCNC: 91 MG/DL
POTASSIUM SERPL-SCNC: 4.5 MMOL/L
SODIUM BLD-SCNC: 141 MMOL/L

## 2020-04-30 NOTE — PROGRESS NOTES
(FEMARA) 2.5 MG tablet Take 2.5 mg by mouth daily  Historical Provider, MD   levothyroxine (LEVOTHROID) 125 MCG tablet Take 125 mcg by mouth Daily EVERY SUN, TUE, THUR, SAT  Historical Provider, MD   lisinopril (PRINIVIL;ZESTRIL) 40 MG tablet Take 40 mg by mouth daily  Historical Provider, MD   loperamide (IMODIUM) 2 MG capsule Take 2 mg by mouth every 6 hours as needed for Diarrhea  Historical Provider, MD   metoprolol tartrate (LOPRESSOR) 25 MG tablet Take 25 mg by mouth 2 times daily  Historical Provider, MD   omeprazole (PRILOSEC) 20 MG delayed release capsule Take 20 mg by mouth daily  Historical Provider, MD   ondansetron (ZOFRAN) 4 MG tablet Take 4 mg by mouth every 8 hours as needed for Nausea or Vomiting  Historical Provider, MD   sodium chloride (OCEAN, BABY AYR) 0.65 % nasal spray 2 sprays by Nasal route as needed for Congestion  Historical Provider, MD   Cholecalciferol (VITAMIN D3) 1000 units TABS Take 2,000 Units by mouth daily  Historical Provider, MD       Social History     Tobacco Use    Smoking status: Unknown If Ever Smoked    Smokeless tobacco: Never Used   Substance Use Topics    Alcohol use: No    Drug use: Not on file        Past Medical History:   Diagnosis Date    CAD (coronary artery disease)     Combined hyperlipidemia     [facility] 2) [facility]     [facility] thrombosis) [facility]     Hypertension     Hypothyroidism     Phantom limb syndrome ([facility] 9/23/2017    PVD (peripheral vascular disease) ([facility]    ,   Past Surgical History:   Procedure Laterality Date    ABOVE KNEE AMPUTATION      left    APPENDECTOMY      CHOLECYSTECTOMY      CORONARY ANGIOPLASTY WITH STENT PLACEMENT      LEG AMPUTATION BELOW KNEE      right   , No family history on file.     PHYSICAL EXAMINATION:  [ INSTRUCTIONS:  \"[x]\" Indicates a positive item  \"[]\" Indicates a negative item  -- DELETE ALL ITEMS NOT EXAMINED]  Vital Signs: (As obtained by patient/caregiver or practitioner

## 2020-05-06 LAB
AVERAGE GLUCOSE: 143
HBA1C MFR BLD: 6.6 %

## 2020-05-08 ENCOUNTER — TELEPHONE (OUTPATIENT)
Dept: GERIATRIC MEDICINE | Age: 85
End: 2020-05-08

## 2020-05-08 RX ORDER — OXYCODONE HYDROCHLORIDE AND ACETAMINOPHEN 5; 325 MG/1; MG/1
1 TABLET ORAL 2 TIMES DAILY
Status: CANCELLED | OUTPATIENT
Start: 2020-05-08

## 2020-05-12 ENCOUNTER — TELEPHONE (OUTPATIENT)
Dept: GERIATRIC MEDICINE | Age: 85
End: 2020-05-12

## 2020-05-15 DIAGNOSIS — G89.29 CHRONIC LEFT SHOULDER PAIN: ICD-10-CM

## 2020-05-15 DIAGNOSIS — M15.9 OSTEOARTHRITIS OF MULTIPLE JOINTS, UNSPECIFIED OSTEOARTHRITIS TYPE: ICD-10-CM

## 2020-05-15 DIAGNOSIS — M25.512 CHRONIC LEFT SHOULDER PAIN: ICD-10-CM

## 2020-05-15 DIAGNOSIS — G89.29 OTHER CHRONIC PAIN: Primary | ICD-10-CM

## 2020-05-15 RX ORDER — OXYCODONE HYDROCHLORIDE AND ACETAMINOPHEN 5; 325 MG/1; MG/1
1 TABLET ORAL 2 TIMES DAILY PRN
Qty: 60 TABLET | Refills: 0 | Status: SHIPPED | OUTPATIENT
Start: 2020-05-15 | End: 2020-06-12 | Stop reason: SDUPTHER

## 2020-05-18 ENCOUNTER — OFFICE VISIT (OUTPATIENT)
Dept: GERIATRIC MEDICINE | Age: 85
End: 2020-05-18
Payer: MEDICARE

## 2020-05-18 PROCEDURE — 99309 SBSQ NF CARE MODERATE MDM 30: CPT | Performed by: INTERNAL MEDICINE

## 2020-05-18 PROCEDURE — 1123F ACP DISCUSS/DSCN MKR DOCD: CPT | Performed by: INTERNAL MEDICINE

## 2020-06-01 LAB
ALBUMIN SERPL-MCNC: 3.4 G/DL
ALP BLD-CCNC: 102 U/L
ALT SERPL-CCNC: 11 U/L
ANION GAP SERPL CALCULATED.3IONS-SCNC: NORMAL MMOL/L
AST SERPL-CCNC: 13 U/L
BILIRUB SERPL-MCNC: 0.4 MG/DL (ref 0.1–1.4)
BUN BLDV-MCNC: 27 MG/DL
CALCIUM SERPL-MCNC: 9.5 MG/DL
CHLORIDE BLD-SCNC: 110 MMOL/L
CHOLESTEROL, TOTAL: 101 MG/DL
CHOLESTEROL/HDL RATIO: 1.1
CO2: 23 MMOL/L
CREAT SERPL-MCNC: 1.7 MG/DL
GFR CALCULATED: NORMAL
GLUCOSE BLD-MCNC: 85 MG/DL
HDLC SERPL-MCNC: 32 MG/DL (ref 35–70)
LDL CHOLESTEROL CALCULATED: 36 MG/DL (ref 0–160)
POTASSIUM SERPL-SCNC: 4.3 MMOL/L
SODIUM BLD-SCNC: 141 MMOL/L
TOTAL PROTEIN: 5.9
TRIGL SERPL-MCNC: 165 MG/DL
VLDLC SERPL CALC-MCNC: 33 MG/DL

## 2020-06-12 ENCOUNTER — VIRTUAL VISIT (OUTPATIENT)
Dept: GERIATRIC MEDICINE | Age: 85
End: 2020-06-12
Payer: MEDICARE

## 2020-06-12 PROCEDURE — 99308 SBSQ NF CARE LOW MDM 20: CPT | Performed by: PHYSICIAN ASSISTANT

## 2020-06-12 PROCEDURE — 1123F ACP DISCUSS/DSCN MKR DOCD: CPT | Performed by: PHYSICIAN ASSISTANT

## 2020-06-12 RX ORDER — OXYCODONE HYDROCHLORIDE AND ACETAMINOPHEN 5; 325 MG/1; MG/1
1 TABLET ORAL 2 TIMES DAILY PRN
Qty: 60 TABLET | Refills: 0 | Status: SHIPPED | OUTPATIENT
Start: 2020-06-12 | End: 2020-07-15 | Stop reason: SDUPTHER

## 2020-06-16 ASSESSMENT — ENCOUNTER SYMPTOMS
CONSTIPATION: 0
BACK PAIN: 1
SHORTNESS OF BREATH: 0
DIARRHEA: 0

## 2020-06-16 NOTE — PROGRESS NOTES
mouth daily       No current facility-administered medications on file prior to visit. Allergies   Allergen Reactions    Pcn [Penicillins]          No family history on file. Physical Exam:      Physical Exam   Constitutional: She is oriented to person, place, and time. She appears well-developed and well-nourished. HENT:   Head: Normocephalic and atraumatic. Eyes: EOM are normal.   Neck: Neck supple. No JVD present. No thyromegaly present. Cardiovascular: Normal rate and regular rhythm. Pulmonary/Chest: Effort normal and breath sounds normal. She has no wheezes. Abdominal: Soft. Bowel sounds are normal. There is no abdominal tenderness. Musculoskeletal:      Comments: Bilateral lower extremity amputations   Neurological: She is alert and oriented to person, place, and time. Skin: Skin is warm and dry. No rash noted. Psychiatric: She has a normal mood and affect. There were no vitals taken for this visit.       .   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 Diabetes mellitus without complication ([facility]     2. Essential hypertension     3. PAD (peripheral artery disease) ([facility]       To monitor blood sugars repeat A1c when the next 2 to 3 months. Monitoring blood pressure monitor electrolytes. Monitor for evidence of new endorgan disease.

## 2020-07-06 ENCOUNTER — OFFICE VISIT (OUTPATIENT)
Dept: GERIATRIC MEDICINE | Age: 85
End: 2020-07-06
Payer: MEDICARE

## 2020-07-06 PROCEDURE — 1123F ACP DISCUSS/DSCN MKR DOCD: CPT | Performed by: PHYSICIAN ASSISTANT

## 2020-07-06 PROCEDURE — 99309 SBSQ NF CARE MODERATE MDM 30: CPT | Performed by: PHYSICIAN ASSISTANT

## 2020-07-07 LAB
BASOPHILS ABSOLUTE: 0.2 /ΜL
BASOPHILS RELATIVE PERCENT: 2 %
BUN BLDV-MCNC: 24 MG/DL
CALCIUM SERPL-MCNC: 8.6 MG/DL
CHLORIDE BLD-SCNC: 108 MMOL/L
CO2: 23 MMOL/L
CREAT SERPL-MCNC: 2.1 MG/DL
EOSINOPHILS ABSOLUTE: 0.7 /ΜL
EOSINOPHILS RELATIVE PERCENT: 7.2 %
GFR CALCULATED: 22
GLUCOSE BLD-MCNC: 151 MG/DL
HCT VFR BLD CALC: 31 % (ref 36–46)
HEMOGLOBIN: 9.9 G/DL (ref 12–16)
LYMPHOCYTES ABSOLUTE: 2.1 /ΜL
LYMPHOCYTES RELATIVE PERCENT: 21.1 %
MCH RBC QN AUTO: 29.5 PG
MCHC RBC AUTO-ENTMCNC: 32 G/DL
MCV RBC AUTO: 92.4 FL
MONOCYTES ABSOLUTE: 1 /ΜL
MONOCYTES RELATIVE PERCENT: 9.8 %
NEUTROPHILS ABSOLUTE: 6 /ΜL
NEUTROPHILS RELATIVE PERCENT: 59.9 %
PLATELET # BLD: 246 K/ΜL
PMV BLD AUTO: 9.4 FL
POTASSIUM SERPL-SCNC: 4.3 MMOL/L
RBC # BLD: 3.35 10^6/ΜL
SODIUM BLD-SCNC: 142 MMOL/L
WBC # BLD: 10 10^3/ML

## 2020-07-09 LAB
BILIRUBIN, URINE: NEGATIVE
BLOOD, URINE: NEGATIVE
BUN BLDV-MCNC: 29 MG/DL
CALCIUM SERPL-MCNC: 8.7 MG/DL
CHLORIDE BLD-SCNC: 109 MMOL/L
CLARITY: ABNORMAL
CO2: 22 MMOL/L
COLOR: YELLOW
CREAT SERPL-MCNC: 2.2 MG/DL
GFR CALCULATED: 21
GLUCOSE BLD-MCNC: 99 MG/DL
GLUCOSE URINE: ABNORMAL
KETONES, URINE: NEGATIVE
LEUKOCYTE ESTERASE, URINE: POSITIVE
NITRITE, URINE: POSITIVE
PH UA: 5 (ref 4.5–8)
POTASSIUM SERPL-SCNC: 4.3 MMOL/L
PROTEIN UA: NEGATIVE
SODIUM BLD-SCNC: 140 MMOL/L
SPECIFIC GRAVITY, URINE: 1.01
UROBILINOGEN, URINE: NORMAL

## 2020-07-11 LAB
ANION GAP SERPL CALCULATED.3IONS-SCNC: 13 MEQ/L (ref 9–15)
BUN BLDV-MCNC: 27 MG/DL (ref 8–23)
CALCIUM SERPL-MCNC: 8.6 MG/DL (ref 8.5–9.9)
CHLORIDE BLD-SCNC: 107 MEQ/L (ref 95–107)
CO2: 18 MEQ/L (ref 20–31)
CREAT SERPL-MCNC: 2.15 MG/DL (ref 0.5–0.9)
GFR AFRICAN AMERICAN: 25.8
GFR NON-AFRICAN AMERICAN: 21.4
GLUCOSE BLD-MCNC: 94 MG/DL (ref 70–99)
POTASSIUM SERPL-SCNC: 4.4 MEQ/L (ref 3.4–4.9)
SODIUM BLD-SCNC: 138 MEQ/L (ref 135–144)

## 2020-07-14 ASSESSMENT — ENCOUNTER SYMPTOMS
RESPIRATORY NEGATIVE: 1
GASTROINTESTINAL NEGATIVE: 1

## 2020-07-14 NOTE — PROGRESS NOTES
2020    TELEHEALTH EVALUATION -- Audio/Visual (During RWSBU-24 public health emergency)    Due to [place] 19 outbreak, patient's office visit was converted to a virtual visit. Patient was contacted and agreed to proceed with a virtual visit via FaceTime  The risks and benefits of converting to a virtual visit were discussed in light of the current infectious disease epidemic. Patient also understood that insurance coverage and co-pays are up to their individual insurance plans. HPI:    Baltazar Stephan (:  12/3/1926) has requested an audio/video evaluation for the following concern(s):    Patient was seen today as an IV of patient for acute acute visit neuropathy. Patient has had this longstanding chronic right wrist 345 digit hand pain. Interferes  Moderately with overall daily activities. Patient does talon and utilizes her hands for crafting activities throughout the day. Patient does have Neurontin that is utilized routinely throughout the day, 400 mg p.o. 3 times daily. Discussed with patient about checking her kidney function and adding a 300 g PRN dose for neuropathic pain. Patient agreed. Will check patient's BMP do closer monitoring of renal function during this trial.  Patient will have trial For approximately 1 to 2 months. If renal function stays static we will continue indefinitely. Patient does not elect at this time. Follow up with orthopedic surgery for any advanced intervention. We will revisit a later date if pain worsens. Review of Systems   Constitutional: Positive for fatigue (chronic). Negative for activity change. Respiratory: Negative. Cardiovascular: Negative. Gastrointestinal: Negative. Genitourinary: Negative. Musculoskeletal:        Chronic right wrist pain     Psychiatric/Behavioral: Negative. Prior to Visit Medications    Medication Sig Taking?  Authorizing Provider   ELIQUIS 2.5 MG TABS tablet Take 2.5 mg by mouth 2 times daily  Historical Provider, MD   gabapentin (NEURONTIN) 100 MG capsule Take 2 capsules by mouth nightly  VIVI Crooks CNP   gabapentin (NEURONTIN) 100 MG capsule Take 1 capsule by mouth daily  VIVI Crooks CNP   insulin glargine (LANTUS SOLOSTAR) 100 UNIT/ML injection pen Inject 40 Units into the skin nightly  VIVI Crooks CNP   insulin aspart (NOVOLOG FLEXPEN) 100 UNIT/ML injection pen Inject 9 Units into the skin daily (with breakfast)  VIVI Crooks CNP   insulin aspart (NOVOLOG FLEXPEN) 100 UNIT/ML injection pen Inject 9 Units into the skin Daily with lunch  VIVI Crooks CNP   insulin aspart (NOVOLOG FLEXPEN) 100 UNIT/ML injection pen Inject 7 Units into the skin Daily with supper  VIVI Crooks CNP   acetaminophen (TYLENOL) 500 MG tablet Take 500 mg by mouth 2 times daily  Historical Provider, MD   amLODIPine (NORVASC) 5 MG tablet Take 5 mg by mouth daily  Historical Provider, MD   polyvinyl alcohol (LIQUIFILM TEARS) 1.4 % ophthalmic solution Place 2 drops into both eyes 4 times daily as needed  Historical Provider, MD   aspirin 81 MG tablet Take 81 mg by mouth daily  Historical Provider, MD   atorvastatin (LIPITOR) 40 MG tablet Take 40 mg by mouth daily  Historical Provider, MD   Cranberry (AZO CRANBERRY GUMMIES) 500 MG CHEW Take by mouth 2 times daily  Historical Provider, MD   letrozole (FEMARA) 2.5 MG tablet Take 2.5 mg by mouth daily  Historical Provider, MD   levothyroxine (LEVOTHROID) 125 MCG tablet Take 125 mcg by mouth Daily EVERY SUN, TUE, THUR, SAT  Historical Provider, MD   lisinopril (PRINIVIL;ZESTRIL) 40 MG tablet Take 40 mg by mouth daily  Historical Provider, MD   loperamide (IMODIUM) 2 MG capsule Take 2 mg by mouth every 6 hours as needed for Diarrhea  Historical Provider, MD   metoprolol tartrate (LOPRESSOR) 25 MG tablet Take 25 mg by mouth 2 times daily  Historical Provider, MD   omeprazole (PRILOSEC) 20 MG delayed release capsule Take 20 mg by mouth daily Historical Provider, MD   ondansetron (ZOFRAN) 4 MG tablet Take 4 mg by mouth every 8 hours as needed for Nausea or Vomiting  Historical Provider, MD   sodium chloride (OCEAN, BABY AYR) 0.65 % nasal spray 2 sprays by Nasal route as needed for Congestion  Historical Provider, MD   Cholecalciferol (VITAMIN D3) 1000 units TABS Take 2,000 Units by mouth daily  Historical Provider, MD       Social History     Tobacco Use    Smoking status: Unknown If Ever Smoked    Smokeless tobacco: Never Used   Substance Use Topics    Alcohol use: No    Drug use: Not on file        Allergies   Allergen Reactions    Pcn [Penicillins]    ,   Past Medical History:   Diagnosis Date    CAD (coronary artery disease)     Combined hyperlipidemia     DM2 (diabetes mellitus, type 2) ([address] [facility]     [facility]     Hypertension     Hypothyroidism     [facility] ([facility] 9/23/2017    PVD (peripheral vascular disease) ([facility]    ,   Past Surgical History:   Procedure Laterality Date    ABOVE KNEE AMPUTATION      left    APPENDECTOMY      CHOLECYSTECTOMY      CORONARY ANGIOPLASTY WITH STENT PLACEMENT      LEG AMPUTATION BELOW KNEE      right   ,   Social History     Tobacco Use    Smoking status: Unknown If Ever Smoked    Smokeless tobacco: Never Used   Substance Use Topics    Alcohol use: No    Drug use: Not on file   , No family history on file.,   Immunization History   Administered Date(s) Administered    Influenza Virus Vaccine 10/13/2016   ,   Health Maintenance   Topic Date Due    DTaP/Tdap/Td vaccine (1 - Tdap) 12/03/1945    Shingles Vaccine (1 of 2) 12/03/1976    Pneumococcal 65+ years Vaccine (1 of 1 - PPSV23) 12/03/1991    TSH testing  06/11/2019    Annual Wellness Visit (AWV)  06/24/2020    Flu vaccine (1) 09/01/2020    Potassium monitoring  07/11/2021    Creatinine monitoring  07/11/2021    Hepatitis A vaccine  Aged Out    Hib vaccine  Aged Out    Meningococcal (ACWY) vaccine Aged Out       PHYSICAL EXAMINATION:  [ INSTRUCTIONS:  \"[x]\" Indicates a positive item  \"[]\" Indicates a negative item  -- DELETE ALL ITEMS NOT EXAMINED]  Vital Signs: (As obtained by patient/caregiver or practitioner observation)    Blood pressure-  Heart rate-    Respiratory rate-    Temperature-  Pulse oximetry-     Constitutional: [] Appears well-developed and well-nourished [] No apparent distress      [] Abnormal-   Mental status  [] Alert and awake  [] Oriented to person/place/time []Able to follow commands      Eyes:  EOM    []  Normal  [] Abnormal-  Sclera  []  Normal  [] Abnormal -         Discharge []  None visible  [] Abnormal -    HENT:   [] Normocephalic, atraumatic. [] Abnormal   [] Mouth/Throat: Mucous membranes are moist.     External Ears [] Normal  [] Abnormal-     Neck: [] No visualized mass     Pulmonary/Chest: [] Respiratory effort normal.  [] No visualized signs of difficulty breathing or respiratory distress        [] Abnormal-      Musculoskeletal:   [] Normal gait with no signs of ataxia         [] Normal range of motion of neck        [] Abnormal-       Neurological:        [] No Facial Asymmetry (Cranial nerve 7 motor function) (limited exam to video visit)          [] No gaze palsy        [] Abnormal-         Skin:        [] No significant exanthematous lesions or discoloration noted on facial skin         [] Abnormal-            Psychiatric:       [] Normal Affect [] No Hallucinations        [] Abnormal-     Other pertinent observable physical exam findings-     Due to this being a TeleHealth encounter, evaluation of the following organ systems is limited: Vitals/Constitutional/EENT/Resp/CV/GI//MS/Neuro/Skin/Heme-Lymph-Imm. ASSESSMENT/PLAN:  1. Chronic pain of right wrist  Continue new current gabapentin. Add 300 mg p.o. every 24 hours PRN. 2. Carpal tunnel syndrome of right wrist  See #1  #3 CKD stage III-monitor BMP PRN  No follow-ups on file.     An  electronic signature was used to authenticate this note. --STACEY Hurst on 7/14/2020 at 6:52 PM        Pursuant to the emergency declaration under the [address] [place] [address], [facility] waiver authority and the [employer] and [employer] Act, this Virtual  Visit was conducted, with patient's consent, to reduce the patient's risk of exposure to COVID-19 and provide continuity of care for an established patient. Services were provided through a video synchronous discussion virtually to substitute for in-person clinic visit.

## 2020-07-15 ENCOUNTER — OFFICE VISIT (OUTPATIENT)
Dept: GERIATRIC MEDICINE | Age: 85
End: 2020-07-15
Payer: MEDICARE

## 2020-07-15 PROCEDURE — 1123F ACP DISCUSS/DSCN MKR DOCD: CPT | Performed by: PHYSICIAN ASSISTANT

## 2020-07-15 PROCEDURE — 99308 SBSQ NF CARE LOW MDM 20: CPT | Performed by: PHYSICIAN ASSISTANT

## 2020-07-15 RX ORDER — OXYCODONE HYDROCHLORIDE AND ACETAMINOPHEN 5; 325 MG/1; MG/1
1 TABLET ORAL 2 TIMES DAILY PRN
Qty: 60 TABLET | Refills: 0 | Status: SHIPPED | OUTPATIENT
Start: 2020-07-15 | End: 2020-08-26 | Stop reason: SDUPTHER

## 2020-07-20 LAB
BUN BLDV-MCNC: 32 MG/DL
CALCIUM SERPL-MCNC: 8.5 MG/DL
CHLORIDE BLD-SCNC: 110 MMOL/L
CO2: 21 MMOL/L
CREAT SERPL-MCNC: 1.9 MG/DL
GFR CALCULATED: NORMAL
GLUCOSE BLD-MCNC: 92 MG/DL
POTASSIUM SERPL-SCNC: 4.6 MMOL/L
SODIUM BLD-SCNC: 139 MMOL/L

## 2020-07-29 ENCOUNTER — OFFICE VISIT (OUTPATIENT)
Dept: GERIATRIC MEDICINE | Age: 85
End: 2020-07-29
Payer: MEDICARE

## 2020-07-29 PROCEDURE — 1123F ACP DISCUSS/DSCN MKR DOCD: CPT | Performed by: PHYSICIAN ASSISTANT

## 2020-07-29 PROCEDURE — 99308 SBSQ NF CARE LOW MDM 20: CPT | Performed by: PHYSICIAN ASSISTANT

## 2020-08-05 NOTE — PROGRESS NOTES
activity     Days per week: Not on file     Minutes per session: Not on file    Stress: Not on file   Relationships    Social connections     Talks on phone: Not on file     Gets together: Not on file     Attends [employer] service: Not on file     Active member of club or organization: Not on file     Attends meetings of clubs or organizations: Not on file     Relationship status: Not on file    Intimate partner violence     Fear of current or ex partner: Not on file     Emotionally abused: Not on file     Physically abused: Not on file     Forced sexual activity: Not on file   Other Topics Concern    Not on file   Social History Narrative    Not on file         REVIEW OF SYSTEMS:  Constitutional:  No new fever, chills. Does complain of intermittent bouts of nausea, vomiting. No new vomiting episode today. General weakness and fatigue at baseline. No significant weight changes. HEENT:  Denies LOC; states mild headache; no lightheadedness or loss of consciousness/trauma. Cardiac:  No chest pain, shortness of breath, or palpitations. Denies any orthopnea, PND. No new evidence of vomiting with aspiration. Pulmonary:  No wheezing, POI, hemoptysis. GI:  Some minor abdominal pain, some suprapubic tenderness. The patient's appetite is poor at this time. Oral intake of fluids is fair. :  Does have some dysuria, but denies suprapubic tenderness at this time. Psychiatric:  The patient's mood is overall stable; states improved, with mild symptom-relief over the past day or 2 with Zofran. PHYSICAL EXAMINATION:  General:  The patient displays good hygiene overall. Mildly subdued affect compared to baseline. Pupils equal, round, and reactive to light. No icterus, no injection. Moist mucous membranes. No erythema, exudate. Cardiac:  Regular rate and rhythm. No JVD noted today. Pulmonary:  Lung sounds are clear to auscultation bilaterally. Skin:  Skin turgor is fair.   Abdomen:  Normal bowel sounds, slightly hyperactive throughout. No tenderness to palpation in all 4 quadrants. Mental status: The patient is awake, alert, oriented 3/3. ASSESSMENT AND PLAN:  1. Acute nausea, vomiting, diarrhea episodes - we will continue Zofran. We will do BMP and CBC to monitor; give additional fluids per orders. Vital signs q.8 hours. Utilize Zofran as needed. 2.   Positive UTI - continue Bactrim. Monitor followup labs. Continue with additional fluids.          Electronically Signed By: Mario Alberto Aguilar PA-C on 08/05/2020 12:06:33  ______________________________  Mario Alberto Aguilar PA-C  [id number]  D: 08/02/2020 15:11:42  T: 08/03/2020 11:15:15    cc: - [employer]

## 2020-08-06 ENCOUNTER — OFFICE VISIT (OUTPATIENT)
Dept: GERIATRIC MEDICINE | Age: 85
End: 2020-08-06
Payer: MEDICARE

## 2020-08-06 PROCEDURE — 99308 SBSQ NF CARE LOW MDM 20: CPT | Performed by: PHYSICIAN ASSISTANT

## 2020-08-06 PROCEDURE — 1123F ACP DISCUSS/DSCN MKR DOCD: CPT | Performed by: PHYSICIAN ASSISTANT

## 2020-08-12 ENCOUNTER — OFFICE VISIT (OUTPATIENT)
Dept: GERIATRIC MEDICINE | Age: 85
End: 2020-08-12
Payer: MEDICARE

## 2020-08-12 PROCEDURE — 1123F ACP DISCUSS/DSCN MKR DOCD: CPT | Performed by: PHYSICIAN ASSISTANT

## 2020-08-12 PROCEDURE — 99308 SBSQ NF CARE LOW MDM 20: CPT | Performed by: PHYSICIAN ASSISTANT

## 2020-08-22 NOTE — PROGRESS NOTES
PATIENT: Michael Crawley : 12/3/1926 DOS: 07/15/2020     [facility]    HPI: Vital signs reviewed. See the [address] Patient being seen today for her monthly followup visit for history of DM type 2, chronic peripheral neuropathy, and right hand/wrist carpal tunnel symptoms-patient currently utilizing a wrist brace and limiting exacerbating activity. She likes to talon. Has had to limit herself somewhat due to wrist and 3, 4, 5 digit discomfort and tingling sensation. Encouraged ortho followup if symptoms persist.  Patient's hemoglobin A1c was 6.6 on 2020. We will recheck on routine basis in the next 6 months to reassess. Otherwise, she is doing well. Following diet well. Limits sweets and sugared foods \"as best she can\". Peripheral neuropathy is fairly well controlled on gabapentin. No new concerns other than hand and wrist weakness and tingling described earlier. No acute complaints noted today. We will follow up at next monthly visit or for any acute issues in the interim. MEDICATIONS:  Reviewed. ALLERGIES:  Reviewed. REVIEW OF SYSTEMS:  Constitutional:  Afebrile. No new fever, chills, nausea, vomiting with this. HEENT:  No headache, confusion, lightheadedness. Cardiopulmonary:  Denies any CP, orthopnea, PND, or RODRIGUEZ. No new cough, SOB, POI, or wheezing. GI:  Denies any indigestion, dysphagia, or change in bowel habits. Denies abdominal pain. :  Denies any new urgency, polyuria, nocturia, hematuria, or other abnormalities. The patient is incontinent of urine. Does have history of UTIs. MSK:  Patient does have right side hand/wrist numbness, tingling, and peripheral pain, secondary to questionable carpal tunnel syndrome. Psychiatric:  Patient's mood is overall stable, very pleasant, cooperative. Fair memory overall despite age. Remaining 14-point ROS unremarkable. PHYSICAL EXAMINATION:  General:  Good hygiene, bright affect, no acute distress.   HEENT: Pupils equal, round, and reactive to light. No erythema or exudate. Cardiac:  Regular rate and rhythm. No JVD. Pulmonary:  Lung sounds clear bilaterally. Abdominal:  Normoactive bowel sounds. No tenderness. No rigidity or guarding. Denies any abdominal complaints. Mental Status: The patient is awake, alert, oriented 3/3. ASSESSMENT AND PLAN:  1.   DM type 2 - Monitor hemoglobin A1c as needed. No new changes at this time to management. 2.   Peripheral neuropathy - no new changes. Continue with gabapentin orders. 3.   Right hand weakness/ ?carpal tunnel syndrome/neuropathy - encouraged to follow up with Ortho if symptoms worsen. We will also consider pain management if worsens as well per pt tolerance.         Electronically Signed By: Amy Beard PA-C on 08/16/2020 21:42:07  ______________________________  Amy Beard PA-C [id number]  D: 08/16/2020 14:51:07  T: 08/16/2020 16:10:30    cc: - [email or web address]

## 2020-08-26 ENCOUNTER — OFFICE VISIT (OUTPATIENT)
Dept: GERIATRIC MEDICINE | Age: 85
End: 2020-08-26
Payer: MEDICARE

## 2020-08-26 PROCEDURE — 1123F ACP DISCUSS/DSCN MKR DOCD: CPT | Performed by: PHYSICIAN ASSISTANT

## 2020-08-26 PROCEDURE — 99308 SBSQ NF CARE LOW MDM 20: CPT | Performed by: PHYSICIAN ASSISTANT

## 2020-08-26 RX ORDER — OXYCODONE HYDROCHLORIDE AND ACETAMINOPHEN 5; 325 MG/1; MG/1
1 TABLET ORAL 2 TIMES DAILY PRN
Qty: 60 TABLET | Refills: 0 | Status: SHIPPED | OUTPATIENT
Start: 2020-08-26 | End: 2020-10-06 | Stop reason: SDUPTHER

## 2020-08-26 NOTE — PROGRESS NOTES
PATIENT: Mir Campbell : 12/3/1926 DOS: 2020     [facility]    HPI: Vital signs reviewed on site today, within normal limits. The patient is being seen today for follow up on right hand pain. The patient finally states that she would like to do an orthopedic eval.  States that she was talking to it by a nursing staff, who promoted the idea, expressed her to bring her crocheting equipment to relay it to orthopedic provider, her daily activities, and what potentially could be done to mitigate her discomfort while still allowing her to continue crocheting. The patient will be going with a staff member who can relay the information as well. We will follow up with the patient next week for any further issues. The patient overall doing good otherwise. Vital signs are stable. We will follow up with the patient post visit to reassess. MEDICATIONS:  Reviewed. ALLERGIES:  Reviewed. REVIEW OF SYSTEMS:  Constitutional:  No new fever, chills, nausea, vomiting, or increased fatigue beyond baseline. No new acute weight changes. Neurological:  Does complain of right 3, 4, 5 digit tingling and numbness and slightly decreased sensation and moderate discomfort in the area, likely associated with carpal tunnel syndrome. Cardiac:  No chest pain, shortness of breath, palpitations, orthopnea. Pulmonary:  No new cough, SOB, POI, or wheezing. Skin:  Denies any acute skin issue at this point in time. Psychiatric:  The patient's mood is overall stable. No depression or anxiety reported. Remaining 14-point ROS unremarkable. PHYSICAL EXAMINATION:  General:  Good hygiene, bright affect. No acute distress. Cardiac:  Regular rate and rhythm. No JVD noted today. No significant lower leg edema. Pulmonary:  LSCTA. Skin:  Affected area of right hand clean, dry, and intact. No acute complaint of bruising, trauma, or evidence of acute skin changes.    Neuro/MSK:  The patient does have weakness bilaterally about 3/5 strength in left arm/hand and 2-3/5 strength and  in right hand. There is intact sensation in upper periphery to touch, temperature, and pressure; however, it is slightly diminished on the right. Mental Status: The patient is awake, alert, oriented 3/3. Fair memory overall. ASSESSMENT AND PLAN:  Right digits neuropathy/carpal tunnel syndrome - patient to see orthopedic provider. Instructed the patient to bring her talon nstruments to explain ability she would like to retain while hopefully mitigating pain and uncomfortable sensations. We will follow up with the patient status post ortho visit.           Electronically Signed By: Kailee Summers PA-C on 08/11/2020 09:35:52  ______________________________  LADONNA Richmond/[id number]  D: 08/06/2020 19:30:05  T: 08/07/2020 05:06:19    cc: - [facility]

## 2020-08-27 ENCOUNTER — OFFICE VISIT (OUTPATIENT)
Dept: GERIATRIC MEDICINE | Age: 85
End: 2020-08-27
Payer: MEDICARE

## 2020-08-27 PROCEDURE — 99309 SBSQ NF CARE MODERATE MDM 30: CPT | Performed by: INTERNAL MEDICINE

## 2020-08-27 PROCEDURE — 1123F ACP DISCUSS/DSCN MKR DOCD: CPT | Performed by: INTERNAL MEDICINE

## 2020-08-31 ASSESSMENT — ENCOUNTER SYMPTOMS
BACK PAIN: 1
GASTROINTESTINAL NEGATIVE: 1
RESPIRATORY NEGATIVE: 1

## 2020-08-31 NOTE — PROGRESS NOTES
PATIENT: Tobin Nguyen : 1926 DOS: 2020     [facility]    HPI: The patient is being seen today for acute visit as a [facility] patient. The patient has decided after discussion about her right wrist carpal tunnel syndrome that she would like to wait until quarantine parameters lifted on at least a [place] or [place] scale before pursuing her visit with the orthopedic surgeon to be evaluated for carpal tunnel and any intervention that can take place. The patient's main concern remains her crocheting, potential ability. She spends a lot of her time doing this day to day and would like to continue doing it into her latter years. The patient is otherwise doing well and has no further complaints. When patient is ready, we will schedule follow up with orthopedics to discuss options for her right hand carpal tunnel and have it addressed. MEDICATIONS:  Reviewed. ALLERGIES:  Reviewed. Review of Systems   Constitutional: Negative for activity change, appetite change, fever and unexpected weight change. Respiratory: Negative. Cardiovascular: Negative. Gastrointestinal: Negative. Musculoskeletal: Positive for arthralgias, back pain, gait problem (left leg amputee) and myalgias. Negative for neck pain and neck stiffness. Neurological: Positive for tremors and weakness. Psychiatric/Behavioral: Negative. Physical Exam  Constitutional:       General: She is not in acute distress. Appearance: Normal appearance. She is normal weight. HENT:      Head: Normocephalic and atraumatic. Mouth/Throat:      Mouth: Mucous membranes are moist.      Pharynx: Oropharynx is clear. Eyes:      Extraocular Movements: Extraocular movements intact. Conjunctiva/sclera: Conjunctivae normal.      Pupils: Pupils are equal, round, and reactive to light. Cardiovascular:      Rate and Rhythm: Normal rate and regular rhythm.    Pulmonary:      Effort: Pulmonary effort is

## 2020-09-12 NOTE — PROGRESS NOTES
p.r.n. gabapentin. We will follow up with ortho in one week.            Electronically Signed By: Kailee Summers PA-C on 09/11/2020 23:57:55  ______________________________  Kailee Summers PA-C  [id number]  D: 09/08/2020 23:09:07  T: 09/09/2020 19:16:36     cc: [facility]

## 2020-09-16 ENCOUNTER — HOSPITAL ENCOUNTER (OUTPATIENT)
Age: 85
Setting detail: OUTPATIENT SURGERY
Discharge: OTHER INSTITUTION | End: 2020-09-16
Attending: ORTHOPAEDIC SURGERY | Admitting: ORTHOPAEDIC SURGERY
Payer: MEDICARE

## 2020-09-16 ENCOUNTER — ANESTHESIA (OUTPATIENT)
Dept: OPERATING ROOM | Age: 85
End: 2020-09-16
Payer: MEDICARE

## 2020-09-16 ENCOUNTER — ANESTHESIA EVENT (OUTPATIENT)
Dept: OPERATING ROOM | Age: 85
End: 2020-09-16
Payer: MEDICARE

## 2020-09-16 VITALS
BODY MASS INDEX: 29.45 KG/M2 | RESPIRATION RATE: 20 BRPM | HEIGHT: 60 IN | OXYGEN SATURATION: 95 % | SYSTOLIC BLOOD PRESSURE: 188 MMHG | DIASTOLIC BLOOD PRESSURE: 77 MMHG | HEART RATE: 53 BPM | TEMPERATURE: 97.1 F | WEIGHT: 150 LBS

## 2020-09-16 VITALS — SYSTOLIC BLOOD PRESSURE: 165 MMHG | OXYGEN SATURATION: 97 % | DIASTOLIC BLOOD PRESSURE: 72 MMHG

## 2020-09-16 LAB
GLUCOSE BLD-MCNC: 113 MG/DL (ref 60–115)
PERFORMED ON: NORMAL
SARS-COV-2, NAAT: NOT DETECTED

## 2020-09-16 PROCEDURE — 2580000003 HC RX 258: Performed by: ORTHOPAEDIC SURGERY

## 2020-09-16 PROCEDURE — 3600000002 HC SURGERY LEVEL 2 BASE: Performed by: ORTHOPAEDIC SURGERY

## 2020-09-16 PROCEDURE — 2709999900 HC NON-CHARGEABLE SUPPLY: Performed by: ORTHOPAEDIC SURGERY

## 2020-09-16 PROCEDURE — U0002 COVID-19 LAB TEST NON-CDC: HCPCS

## 2020-09-16 PROCEDURE — 6360000002 HC RX W HCPCS: Performed by: ORTHOPAEDIC SURGERY

## 2020-09-16 PROCEDURE — 3700000000 HC ANESTHESIA ATTENDED CARE: Performed by: ORTHOPAEDIC SURGERY

## 2020-09-16 PROCEDURE — 3600000012 HC SURGERY LEVEL 2 ADDTL 15MIN: Performed by: ORTHOPAEDIC SURGERY

## 2020-09-16 PROCEDURE — 3700000001 HC ADD 15 MINUTES (ANESTHESIA): Performed by: ORTHOPAEDIC SURGERY

## 2020-09-16 PROCEDURE — 2500000003 HC RX 250 WO HCPCS: Performed by: ORTHOPAEDIC SURGERY

## 2020-09-16 RX ORDER — SODIUM CHLORIDE 0.9 % (FLUSH) 0.9 %
10 SYRINGE (ML) INJECTION EVERY 12 HOURS SCHEDULED
Status: DISCONTINUED | OUTPATIENT
Start: 2020-09-16 | End: 2020-09-16 | Stop reason: HOSPADM

## 2020-09-16 RX ORDER — ONDANSETRON 2 MG/ML
4 INJECTION INTRAMUSCULAR; INTRAVENOUS
Status: DISCONTINUED | OUTPATIENT
Start: 2020-09-16 | End: 2020-09-16 | Stop reason: HOSPADM

## 2020-09-16 RX ORDER — MEPERIDINE HYDROCHLORIDE 25 MG/ML
12.5 INJECTION INTRAMUSCULAR; INTRAVENOUS; SUBCUTANEOUS EVERY 5 MIN PRN
Status: DISCONTINUED | OUTPATIENT
Start: 2020-09-16 | End: 2020-09-16 | Stop reason: HOSPADM

## 2020-09-16 RX ORDER — METOCLOPRAMIDE HYDROCHLORIDE 5 MG/ML
10 INJECTION INTRAMUSCULAR; INTRAVENOUS
Status: DISCONTINUED | OUTPATIENT
Start: 2020-09-16 | End: 2020-09-16 | Stop reason: HOSPADM

## 2020-09-16 RX ORDER — HYDROCODONE BITARTRATE AND ACETAMINOPHEN 5; 325 MG/1; MG/1
2 TABLET ORAL PRN
Status: DISCONTINUED | OUTPATIENT
Start: 2020-09-16 | End: 2020-09-16 | Stop reason: HOSPADM

## 2020-09-16 RX ORDER — SODIUM CHLORIDE, SODIUM LACTATE, POTASSIUM CHLORIDE, CALCIUM CHLORIDE 600; 310; 30; 20 MG/100ML; MG/100ML; MG/100ML; MG/100ML
INJECTION, SOLUTION INTRAVENOUS CONTINUOUS
Status: DISCONTINUED | OUTPATIENT
Start: 2020-09-16 | End: 2020-09-16 | Stop reason: HOSPADM

## 2020-09-16 RX ORDER — FENTANYL CITRATE 50 UG/ML
50 INJECTION, SOLUTION INTRAMUSCULAR; INTRAVENOUS EVERY 10 MIN PRN
Status: DISCONTINUED | OUTPATIENT
Start: 2020-09-16 | End: 2020-09-16 | Stop reason: HOSPADM

## 2020-09-16 RX ORDER — SODIUM CHLORIDE 0.9 % (FLUSH) 0.9 %
10 SYRINGE (ML) INJECTION PRN
Status: DISCONTINUED | OUTPATIENT
Start: 2020-09-16 | End: 2020-09-16 | Stop reason: HOSPADM

## 2020-09-16 RX ORDER — DIPHENHYDRAMINE HYDROCHLORIDE 50 MG/ML
12.5 INJECTION INTRAMUSCULAR; INTRAVENOUS
Status: DISCONTINUED | OUTPATIENT
Start: 2020-09-16 | End: 2020-09-16 | Stop reason: HOSPADM

## 2020-09-16 RX ORDER — CEFAZOLIN SODIUM 2 G/50ML
2 SOLUTION INTRAVENOUS
Status: COMPLETED | OUTPATIENT
Start: 2020-09-16 | End: 2020-09-16

## 2020-09-16 RX ORDER — LIDOCAINE HYDROCHLORIDE 10 MG/ML
1 INJECTION, SOLUTION EPIDURAL; INFILTRATION; INTRACAUDAL; PERINEURAL
Status: DISCONTINUED | OUTPATIENT
Start: 2020-09-16 | End: 2020-09-16 | Stop reason: HOSPADM

## 2020-09-16 RX ORDER — LIDOCAINE HYDROCHLORIDE AND EPINEPHRINE 10; 10 MG/ML; UG/ML
20 INJECTION, SOLUTION INFILTRATION; PERINEURAL
Status: COMPLETED | OUTPATIENT
Start: 2020-09-16 | End: 2020-09-16

## 2020-09-16 RX ORDER — HYDROCODONE BITARTRATE AND ACETAMINOPHEN 5; 325 MG/1; MG/1
1 TABLET ORAL PRN
Status: DISCONTINUED | OUTPATIENT
Start: 2020-09-16 | End: 2020-09-16 | Stop reason: HOSPADM

## 2020-09-16 RX ORDER — MAGNESIUM HYDROXIDE 1200 MG/15ML
LIQUID ORAL CONTINUOUS PRN
Status: COMPLETED | OUTPATIENT
Start: 2020-09-16 | End: 2020-09-16

## 2020-09-16 RX ADMIN — SODIUM CHLORIDE, POTASSIUM CHLORIDE, SODIUM LACTATE AND CALCIUM CHLORIDE: 600; 310; 30; 20 INJECTION, SOLUTION INTRAVENOUS at 08:02

## 2020-09-16 RX ADMIN — SODIUM BICARBONATE 2 ML: 84 INJECTION INTRAVENOUS at 07:15

## 2020-09-16 RX ADMIN — CEFAZOLIN SODIUM 2 G: 2 SOLUTION INTRAVENOUS at 08:07

## 2020-09-16 RX ADMIN — LIDOCAINE HYDROCHLORIDE,EPINEPHRINE BITARTRATE 20 ML: 10; .01 INJECTION, SOLUTION INFILTRATION; PERINEURAL at 07:14

## 2020-09-16 RX ADMIN — SODIUM CHLORIDE, POTASSIUM CHLORIDE, SODIUM LACTATE AND CALCIUM CHLORIDE 1000 ML: 600; 310; 30; 20 INJECTION, SOLUTION INTRAVENOUS at 07:14

## 2020-09-16 ASSESSMENT — PULMONARY FUNCTION TESTS
PIF_VALUE: 0
PIF_VALUE: 1
PIF_VALUE: 0
PIF_VALUE: 1
PIF_VALUE: 0
PIF_VALUE: 2
PIF_VALUE: 0
PIF_VALUE: 1
PIF_VALUE: 0
PIF_VALUE: 1
PIF_VALUE: 1
PIF_VALUE: 0

## 2020-09-16 NOTE — OP NOTE
Operative Note      Patient: Shawn Rivera  YOB: 1926  MRN: [id number]    Date of Procedure: 9/16/2020    Preoperative diagnosis: Right carpal tunnel syndrome     Postoperative diagnosis: Right carpal tunnel syndrome     Procedure planned: Right carpal tunnel release     Procedure performed: Right carpal tunnel release     Surgeon: Maya Munson D.O. Assistant: STACEY Kitchen  The physician assistant was present through the entire case. Given the nature of the disease process and the procedure to be performed a skilled surgical assistant was necessary during the case. The assistant was necessary in order to hold retractors and directly assist in the operation. A certified scrub tech was at the back table managing instruments and supplies for the surgical case. Anesthesia: Local field block using lidocaine with epinephrine solution and monitored by the anesthesia team     Estimated blood loss: Less than 10 mL     Drains: None     Tourniquet: None     Specimens: None     Implants: None     Indications: The patient presented to the office with subjective symptoms physical examination an EMG nerve conduction study test consistent with right carpal tunnel syndrome. The patient had failure of nonoperative treatment strategies. After full discussion regarding risks benefits and alternatives the patient elected to forego any additional nonoperative management in favor of right carpal tunnel release. Informed consent was signed and placed in the chart. Complications: None noted at the time of surgery     Description of operation: The patient was taken to the operative suite and placed in the supine position on the operating table. A timeout was performed and the right carpal tunnel was confirmed to be the operative site. The patient was carefully positioned on the table in such a fashion as to pad all bony prominences and peripheral nerves.  The patient was administered appropriate preoperative IV antibiotics. The patient was then prepped and draped in the normal sterile fashion. After prepping and draping a longitudinal incision was marked out directly overlying the transverse carpal ligament in line with the ring finger ray. Forceps were used to gently grasp and pinch the skin in the zone of intended surgical dissection to check for adequate anesthesia. After confirming adequate anesthesia the 15 blade was used to incise skin and dissect down to the subcutaneous plane. The palmar aponeurosis was divided in line with the incision to expose the transverse carpal ligament. The transverse carpal ligament was then meticulously divided under direct visualization, working from distal to proximal, taking care to avoid iatrogenic injury to the underlying contents of the carpal tunnel. The tenotomy scissors were used to release the most proximal fibers of the transverse carpal ligament along with the most distal fibers of the antebrachial fascia. This was done under direct visualization. The distal release of the transverse carpal ligament was carried to the level of the fat change. Direct inspection and digital palpation confirmed complete release of the carpal tunnel. Copious irrigation was performed. Interrupted nylon stitches were used to close the skin. A bulky nonadherent dressing was placed. The patient was then allowed to head to recovery in stable condition. Overall the patient tolerated the procedure well.      Disposition: Stable to PACU           Electronically signed by Bart Ocasio DO on 9/16/2020 at 8:17 AM

## 2020-09-16 NOTE — ANESTHESIA POSTPROCEDURE EVALUATION
Department of Anesthesiology  Postprocedure Note    Patient: Camden Slade  MRN: [id number]  YOB: 1926  Date of evaluation: 9/16/2020  Time:  8:27 AM     Procedure Summary     Date:  09/16/20 Room / Location:  [address]    Anesthesia Start:  0802 Anesthesia Stop:  4472    Procedure:  RIGHT WRIST CARPAL TUNNEL RELEASE (Right Wrist) Diagnosis:  (RIGHT WRIST CARPAL TUNNEL SYNDROME)    Surgeon:  Audra Robb DO Responsible Provider:  VIVI Garnett CRNA    Anesthesia Type:  MAC ASA Status:  3          Anesthesia Type: MAC    Eagle Phase I: Eagle Score: 9    Eagle Phase II:      Last vitals: Reviewed and per EMR flowsheets.        Anesthesia Post Evaluation    Patient location during evaluation: bedside  Patient participation: complete - patient participated  Level of consciousness: awake and awake and alert  Pain score: 0  Airway patency: patent  Nausea & Vomiting: no nausea and no vomiting  Complications: no  Cardiovascular status: blood pressure returned to baseline and hemodynamically stable  Respiratory status: acceptable  Hydration status: euvolemic

## 2020-09-16 NOTE — ANESTHESIA PRE PROCEDURE
Cranberry (AZO CRANBERRY GUMMIES) 500 MG CHEW Take by mouth 2 times daily   Yes Historical Provider, MD   letrozole (FEMARA) 2.5 MG tablet Take 2.5 mg by mouth daily   Yes Historical Provider, MD   levothyroxine (LEVOTHROID) 125 MCG tablet Take 125 mcg by mouth Daily EVERY SUN, TUE, THUR, SAT   Yes Historical Provider, MD   metoprolol tartrate (LOPRESSOR) 25 MG tablet Take 25 mg by mouth 2 times daily   Yes Historical Provider, MD   omeprazole (PRILOSEC) 20 MG delayed release capsule Take 20 mg by mouth daily   Yes Historical Provider, MD   sodium chloride (OCEAN, BABY AYR) 0.65 % nasal spray 2 sprays by Nasal route as needed for Congestion   Yes Historical Provider, MD   acetaminophen (TYLENOL) 500 MG tablet Take 500 mg by mouth 2 times daily    Historical Provider, MD   lisinopril (PRINIVIL;ZESTRIL) 40 MG tablet Take 40 mg by mouth daily    Historical Provider, MD   loperamide (IMODIUM) 2 MG capsule Take 2 mg by mouth every 6 hours as needed for Diarrhea    Historical Provider, MD   ondansetron (ZOFRAN) 4 MG tablet Take 4 mg by mouth every 8 hours as needed for Nausea or Vomiting    Historical Provider, MD   Cholecalciferol (VITAMIN D3) 1000 units TABS Take 2,000 Units by mouth daily    Historical Provider, MD       Current medications:    Current Facility-Administered Medications   Medication Dose Route Frequency Provider Last Rate Last Dose    ceFAZolin (ANCEF) 2 g in dextrose 3 % 50 mL IVPB (duplex)  2 g Intravenous On Call to Darshan Vizcaino DO        lactated ringers infusion   Intravenous Continuous Akin Salazar  mL/hr at 09/16/20 0714 1,000 mL at 09/16/20 0714       Allergies:     Allergies   Allergen Reactions    Pcn [Penicillins] Rash     50 plus years ago       Problem List:    Patient Active Problem List   Diagnosis Code    Diabetes mellitus type 2, controlled, without complications ([facility] R62.7    Chronic kidney disease (CKD) N18.9    Essential hypertension I10    Hyperlipidemia E78.5    PAD (peripheral artery disease) ([facility]) I73.9    Hypothyroidism E03.9    CAD (coronary artery disease) I25.10    History of DVT (deep vein thrombosis) Z86.718    History of breast cancer Z85.3    Vitamin D deficiency E55.9    Impaired mobility Z74.09    Leukocytosis D72.829    Atherosclerosis of native arteries of extremity with rest pain ([facility]) I70.229    Colitis K52.9    Lens replaced by other means Z96.1    Chronic left shoulder pain M25.512, G89.29    Salmonella sepsis ([facility]) A02.1       Past Medical History:        Diagnosis Date    CAD (coronary artery disease)     Combined hyperlipidemia     DM2 (diabetes mellitus, type 2) ([facility]     DVT (deep venous thrombosis) ([facility])     Hypertension     Hypothyroidism     Phantom limb syndrome ([facility] 9/23/2017    PVD (peripheral vascular disease) ([facility]        Past Surgical History:        Procedure Laterality Date    ABOVE KNEE AMPUTATION      left    APPENDECTOMY      CHOLECYSTECTOMY      CORONARY ANGIOPLASTY WITH STENT PLACEMENT      LEG AMPUTATION BELOW KNEE      right       Social History:    Social History     Tobacco Use    Smoking status: Unknown If Ever Smoked    Smokeless tobacco: Never Used   Substance Use Topics    Alcohol use:  No                                Counseling given: Not Answered      Vital Signs (Current):   Vitals:    09/16/20 0642   BP: (!) 188/77   Pulse: 53   Resp: 20   Temp: 97.1 °F (36.2 °C)   TempSrc: Temporal   SpO2: 95%   Weight: 150 lb (68 kg)   Height: 5' (1.524 m)                                              BP Readings from Last 3 Encounters:   09/16/20 (!) 188/77   10/24/19 (!) 127/40   10/23/19 (!) 172/46       NPO Status: Time of last liquid consumption: 0000                        Time of last solid consumption: 0000                        Date of last liquid consumption: 09/16/20                        Date of last solid food consumption: 09/16/20    BMI:   Wt Readings from Last 3 Encounters: 09/16/20 150 lb (68 kg)   10/16/19 148 lb (67.1 kg)   07/11/19 150 lb (68 kg)     Body mass index is 29.29 kg/m².     CBC:   Lab Results   Component Value Date    WBC 10.0 07/07/2020    RBC 3.35 07/07/2020    RBC 3.48 04/20/2012    HGB 9.9 07/07/2020    HCT 31.0 07/07/2020    MCV 92.4 07/07/2020    RDW 15.4 11/27/2019     07/07/2020       CMP:   Lab Results   Component Value Date     07/20/2020    K 4.6 07/20/2020    K 3.7 10/23/2019     07/20/2020    CO2 21 07/20/2020    BUN 32 07/20/2020    CREATININE 1.9 07/20/2020    GFRAA 25.8 07/11/2020    LABGLOM 21.4 07/11/2020    GLUCOSE 92 07/20/2020    GLUCOSE 58 05/02/2012    PROT 6.5 10/23/2019    CALCIUM 8.5 07/20/2020    BILITOT 0.4 06/01/2020    ALKPHOS 102 06/01/2020    AST 13 06/01/2020    ALT 11 06/01/2020       POC Tests:   Recent Labs     09/16/20  0056   POCGLU 113       Coags:   Lab Results   Component Value Date    PROTIME 10.2 09/03/2019    INR 1.0 09/03/2019       HCG (If Applicable): No results found for: PREGTESTUR, PREGSERUM, HCG, HCGQUANT     ABGs: No results found for: PHART, PO2ART, QRY7NKL, VIE8LDR, BEART, F5HSUSHU     Type & Screen (If Applicable):  No results found for: LABABO, LABRH    Drug/Infectious Status (If Applicable):  No results found for: HIV, HEPCAB    COVID-19 Screening (If Applicable):   Lab Results   Component Value Date    COVID19 Not Detected 09/16/2020         Anesthesia Evaluation  Patient summary reviewed and Nursing notes reviewed no history of anesthetic complications:   Airway: Mallampati: II  TM distance: >3 FB   Neck ROM: full  Mouth opening: > = 3 FB Dental: normal exam         Pulmonary:Negative Pulmonary ROS                              Cardiovascular:    (+) hypertension:, CAD:,       ECG reviewed               Beta Blocker:  Dose within 24 Hrs         Neuro/Psych:   (+) neuromuscular disease:,             GI/Hepatic/Renal:   (+) renal disease: CRI,           Endo/Other:    (+) DiabetesType II DM, using insulin, hypothyroidism, blood dyscrasia: anemia:., .                  ROS comment: Essential tremor Abdominal:           Vascular: negative vascular ROS. Anesthesia Plan      MAC     ASA 3       Induction: intravenous. MIPS: Prophylactic antiemetics administered. Anesthetic plan and risks discussed with patient. Plan discussed with CRNA.     Attending anesthesiologist reviewed and agrees with Kemi Márquez MD   9/16/2020

## 2020-09-16 NOTE — PROGRESS NOTES
Patient ID:  Abdirizak South  [id number]  56 y.o.  12/3/1926  TAKEN TO PHASE 2,   ATTACHED TO MONITOR AND REPORT GIVEN TO RN.   VSS DRSG DRY AND INTACT        Electronically signed by Dayton Monet RN on 9/16/2020

## 2020-09-21 ENCOUNTER — OFFICE VISIT (OUTPATIENT)
Dept: GERIATRIC MEDICINE | Age: 85
End: 2020-09-21
Payer: MEDICARE

## 2020-09-21 PROCEDURE — 99308 SBSQ NF CARE LOW MDM 20: CPT | Performed by: PHYSICIAN ASSISTANT

## 2020-09-21 PROCEDURE — 1123F ACP DISCUSS/DSCN MKR DOCD: CPT | Performed by: PHYSICIAN ASSISTANT

## 2020-09-24 ASSESSMENT — ENCOUNTER SYMPTOMS
ABDOMINAL PAIN: 0
BACK PAIN: 0
SHORTNESS OF BREATH: 0

## 2020-09-24 NOTE — PROGRESS NOTES
Patient Name: Daron Mccabe  YOB: 1926  Medical Record Number: [id number]      History of Present Illness: This very pleasant 80-year-old woman was eval in her room today. Patient has been doing well. No new acute neuropathic type pain no new phantom syndrome limb syndrome. Patient has been compliant with nursing oral intake has been good weight has been at target. Patient has not had any evidence acute decline. Remains on COVID-19 precautions. Review of Systems   Constitutional: Negative for appetite change, fatigue and fever. HENT: Negative for congestion. Respiratory: Negative for shortness of breath. Cardiovascular: Negative for chest pain and palpitations. Gastrointestinal: Negative for abdominal pain. Genitourinary: Negative for dysuria. Musculoskeletal: Negative for back pain. Neurological: Negative for seizures. Psychiatric/Behavioral: Negative for agitation.        Review of Systems: All 14 review of systems negative other than as stated above    Social History     Tobacco Use    Smoking status: Unknown If Ever Smoked    Smokeless tobacco: Never Used   Substance Use Topics    Alcohol use: No    Drug use: Not on file         Past Medical History:   Diagnosis Date    CAD (coronary artery disease)     Combined hyperlipidemia     DM2 [facility] 2) [facility]     [facility] thrombosis) [facility]     Hypertension     Hypothyroidism     Phantom limb syndrome ([facility] [address] 9/23/2017    PVD (peripheral vascular disease) ([facility] [address]            Past Surgical History:   Procedure Laterality Date    ABOVE KNEE AMPUTATION      left    APPENDECTOMY      CARPAL TUNNEL RELEASE Right 9/16/2020    RIGHT WRIST CARPAL TUNNEL RELEASE performed by Twin Moore DO at [address] [place] [address],[address] Floor WITH STENT PLACEMENT      LEG AMPUTATION BELOW KNEE      right         Current Outpatient Medications on File Prior to Visit   Medication Sig Dispense Refill    oxyCODONE-acetaminophen (PERCOCET) 5-325 MG per tablet Take 1 tablet by mouth 2 times daily as needed for Pain for up to 30 days.  60 tablet 0    ELIQUIS 2.5 MG TABS tablet Take 2.5 mg by mouth 2 times daily      gabapentin (NEURONTIN) 100 MG capsule Take 2 capsules by mouth nightly 180 capsule 3    gabapentin (NEURONTIN) 100 MG capsule Take 1 capsule by mouth daily 90 capsule 3    insulin glargine (LANTUS SOLOSTAR) 100 UNIT/ML injection pen Inject 40 Units into the skin nightly 5 Pen 3    insulin aspart (NOVOLOG FLEXPEN) 100 UNIT/ML injection pen Inject 9 Units into the skin daily (with breakfast) 5 Pen 3    insulin aspart (NOVOLOG FLEXPEN) 100 UNIT/ML injection pen Inject 9 Units into the skin Daily with lunch 5 Pen 3    insulin aspart (NOVOLOG FLEXPEN) 100 UNIT/ML injection pen Inject 7 Units into the skin Daily with supper 5 Pen 3    acetaminophen (TYLENOL) 500 MG tablet Take 500 mg by mouth 2 times daily      amLODIPine (NORVASC) 5 MG tablet Take 5 mg by mouth daily      polyvinyl alcohol (LIQUIFILM TEARS) 1.4 % ophthalmic solution Place 2 drops into both eyes 4 times daily as needed      aspirin 81 MG tablet Take 81 mg by mouth daily      atorvastatin (LIPITOR) 40 MG tablet Take 40 mg by mouth daily      Cranberry (AZO CRANBERRY GUMMIES) 500 MG CHEW Take by mouth 2 times daily      letrozole (FEMARA) 2.5 MG tablet Take 2.5 mg by mouth daily      levothyroxine (LEVOTHROID) 125 MCG tablet Take 125 mcg by mouth Daily EVERY SUN, TUE, THUR, SAT      lisinopril (PRINIVIL;ZESTRIL) 40 MG tablet Take 40 mg by mouth daily      loperamide (IMODIUM) 2 MG capsule Take 2 mg by mouth every 6 hours as needed for Diarrhea      metoprolol tartrate (LOPRESSOR) 25 MG tablet Take 25 mg by mouth 2 times daily      omeprazole (PRILOSEC) 20 MG delayed release capsule Take 20 mg by mouth daily      ondansetron (ZOFRAN) 4 MG tablet Take 4 mg by mouth every 8 hours as needed for Nausea or Vomiting      sodium chloride (OCEAN, BABY AYR) 0.65 % nasal spray 2 sprays by Nasal route as needed for Congestion      Cholecalciferol (VITAMIN D3) 1000 units TABS Take 2,000 Units by mouth daily       No current facility-administered medications on file prior to visit. Allergies   Allergen Reactions    Pcn [Penicillins] Rash     50 plus years ago         No family history on file. Physical Exam:      Physical Exam   Constitutional: She is oriented to person, place, and time. She appears well-developed and well-nourished. HENT:   Head: Normocephalic and atraumatic. Eyes: Pupils are equal, round, and reactive to light. No scleral icterus. Neck: Neck supple. No JVD present. Cardiovascular: Normal rate and regular rhythm. Pulmonary/Chest: Effort normal and breath sounds normal. She has no wheezes. Abdominal: Soft. Bowel sounds are normal. There is no abdominal tenderness. Musculoskeletal:      Comments: Bilateral LE amputation with prosthetics   Neurological: She is alert and oriented to person, place, and time. Skin: Skin is warm and dry. Psychiatric: She has a normal mood and affect. Nursing note and vitals reviewed. There were no vitals taken for this visit.     Lab Results   Component Value Date    LABA1C 6.1 08/03/2020     No results found for: EAG  Lab Results   Component Value Date    CHOL 101 06/01/2020    CHOL 93 12/02/2019    CHOL 140 06/11/2018     Lab Results   Component Value Date    TRIG 165 06/01/2020    TRIG 277 12/02/2019    TRIG 295 06/11/2018     Lab Results   Component Value Date    HDL 32 (A) 06/01/2020    HDL 24 (A) 12/02/2019    HDL 35 06/11/2018     Lab Results   Component Value Date    LDLCALC 36 06/01/2020    LDLCALC 14 12/02/2019    LDLCALC 46 06/11/2018     Lab Results   Component Value Date    VLDL 33 06/01/2020    VLDL 55 12/02/2019    VLDL 59 06/11/2018     Lab Results   Component Value Date    CHOLHDLRATIO 1.1 06/01/2020    CHOLHDLRATIO 0.6 12/02/2019 CHOLNICKOLASATIO 1.3 06/11/2018       . Lab Results   Component Value Date    WBC 10.0 07/07/2020    HGB 9.9 (A) 07/07/2020    HCT 31.0 (A) 07/07/2020    MCV 92.4 07/07/2020     07/07/2020     Lab Results   Component Value Date     07/20/2020    K 4.6 07/20/2020    K 3.7 10/23/2019     07/20/2020    CO2 21 07/20/2020    BUN 32 07/20/2020    CREATININE 1.9 07/20/2020    GLUCOSE 92 07/20/2020    GLUCOSE 58 05/02/2012    CALCIUM 8.5 07/20/2020        ASSESSMENT:  Patient Active Problem List   Diagnosis    Diabetes mellitus type 2, controlled, without complications ([address]    Chronic kidney disease (CKD)    Essential hypertension    Hyperlipidemia    PAD (peripheral artery disease) ([address]    Hypothyroidism    CAD (coronary artery disease)    History of DVT (deep vein thrombosis)    History of breast cancer    Vitamin D deficiency    Impaired mobility    Leukocytosis    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 Diabetes mellitus without complication ([address]     2. Essential hypertension     3. Hypothyroidism, unspecified type         Repeat A1c in the next 6 months. May benefit from dedicated diabetic evaluation. Monitoring blood sugars monitoring systolic pressure monitor renal function repeat TSH next 6 months monitoring clinically.

## 2020-09-28 ENCOUNTER — OFFICE VISIT (OUTPATIENT)
Dept: GERIATRIC MEDICINE | Age: 85
End: 2020-09-28
Payer: MEDICARE

## 2020-09-28 PROCEDURE — 99308 SBSQ NF CARE LOW MDM 20: CPT | Performed by: PHYSICIAN ASSISTANT

## 2020-09-28 PROCEDURE — 1123F ACP DISCUSS/DSCN MKR DOCD: CPT | Performed by: PHYSICIAN ASSISTANT

## 2020-09-29 NOTE — PROGRESS NOTES
rales, or rhonchi. Mental Status: The patient is awake, alert, oriented 3/3. MSK:  The patient does have good range of motion throughout bilateral wrists. Strength is equal approximately 4/5  strength bilaterally. ASSESSMENT AND PLAN:  Right wrist carpal tunnel syndrome - patient received injection. We will follow up within the next few weeks to assess efficacy. The patient is to follow up in September. Encouraged enquiring surgical options.          Electronically Signed By: Trish Ontiveros PA-C on 09/28/2020 23:46:54  ______________________________  Trish Ontiveros PA-C  /[id number]  D: 09/26/2020 13:33:24  T: 09/27/2020 11:25:31    cc: - [employer]

## 2020-10-02 ENCOUNTER — OFFICE VISIT (OUTPATIENT)
Dept: GERIATRIC MEDICINE | Age: 85
End: 2020-10-02
Payer: MEDICARE

## 2020-10-02 PROCEDURE — 99309 SBSQ NF CARE MODERATE MDM 30: CPT | Performed by: INTERNAL MEDICINE

## 2020-10-02 PROCEDURE — G8484 FLU IMMUNIZE NO ADMIN: HCPCS | Performed by: INTERNAL MEDICINE

## 2020-10-02 PROCEDURE — 1123F ACP DISCUSS/DSCN MKR DOCD: CPT | Performed by: INTERNAL MEDICINE

## 2020-10-06 RX ORDER — OXYCODONE HYDROCHLORIDE AND ACETAMINOPHEN 5; 325 MG/1; MG/1
1 TABLET ORAL 2 TIMES DAILY PRN
Qty: 60 TABLET | Refills: 0 | Status: SHIPPED | OUTPATIENT
Start: 2020-10-06 | End: 2020-11-02 | Stop reason: SDUPTHER

## 2020-10-09 VITALS
RESPIRATION RATE: 18 BRPM | HEIGHT: 55 IN | DIASTOLIC BLOOD PRESSURE: 72 MMHG | BODY MASS INDEX: 33.86 KG/M2 | WEIGHT: 146.3 LBS | OXYGEN SATURATION: 97 % | SYSTOLIC BLOOD PRESSURE: 169 MMHG | TEMPERATURE: 97.7 F | HEART RATE: 57 BPM

## 2020-10-15 NOTE — PROGRESS NOTES
PATIENT: Toribio Quintero : 1926 DOS: 2020     [facility]      HISTORY OF PRESENT ILLNESS:  The patient is being seen today for right hand carpal tunnel surgery follow up. The patient was very sleepy and listless today in her chair. States that it is due to her pain medication. We will review pain medications upon completion of visit to evaluate. She states that she may not need them anymore. Denies any acute pain complaints in her right wrist and hand. Does state that she still has weakness and some tingling sensation however, it is slightly improved. Educated the patient on recovery and we discussed how it often takes some time for dramatic symptom improvement. The patient offered no further acute complaints. She denies any new onset of joint discomfort, burning or tingling in her right hand. The patient continues to work with PT and OT with minor gains. We will continue to follow up with the patient for any acute issues and monitor her progress status post surgery. MEDICATIONS:  Reviewed. ALLERGIES:  Reviewed. REVIEW OF SYSTEMS:  See HPI for pertinent ROS. PHYSICAL EXAMINATION:  Vital signs reviewed. See [facility]. General:  Good hygiene, bright affect, no acute distress, sleepy and moderately lethargic today, seated in her wheelchair today. MSK:  2/5  strength in right wrist grasp, 3/5 in left side. No redness, swelling in bilateral hands compare to baseline. Range of motion, active and passive within normal limits. No point tenderness throughout bilateral wrists and hands. Mental Status: The patient is awake, semi-alert, lethargic. Fair memory and command following secondary to sleepiness. ASSESSMENT AND PLAN:  Right hand carpal tunnel syndrome, status post surgery - continue pain control measures for the time being. We will review and reassess pain management needs. Continue PT and OT. We will monitor progress.   Follow up as needed with surgeon.         Electronically Signed By: Timi Lorenzo PA-C on 09/30/2020 11:49:30  ______________________________  LADONNA Vera/[id number]  D: 09/29/2020 09:44:25  T: 09/29/2020 12:23:26    cc: [facility]

## 2020-10-23 ENCOUNTER — OFFICE VISIT (OUTPATIENT)
Dept: GERIATRIC MEDICINE | Age: 85
End: 2020-10-23
Payer: MEDICARE

## 2020-10-23 DIAGNOSIS — Z79.4 CONTROLLED TYPE 2 DIABETES MELLITUS WITHOUT COMPLICATION, WITH LONG-TERM CURRENT USE OF INSULIN (HCC): Primary | ICD-10-CM

## 2020-10-23 DIAGNOSIS — N18.32 STAGE 3B CHRONIC KIDNEY DISEASE (HCC): ICD-10-CM

## 2020-10-23 DIAGNOSIS — E11.9 CONTROLLED TYPE 2 DIABETES MELLITUS WITHOUT COMPLICATION, WITH LONG-TERM CURRENT USE OF INSULIN (HCC): Primary | ICD-10-CM

## 2020-10-23 PROCEDURE — G8484 FLU IMMUNIZE NO ADMIN: HCPCS | Performed by: PHYSICIAN ASSISTANT

## 2020-10-23 PROCEDURE — 99309 SBSQ NF CARE MODERATE MDM 30: CPT | Performed by: PHYSICIAN ASSISTANT

## 2020-10-23 PROCEDURE — 1123F ACP DISCUSS/DSCN MKR DOCD: CPT | Performed by: PHYSICIAN ASSISTANT

## 2020-11-02 NOTE — PROGRESS NOTES
Patient Name: Cass Gonzalez  Date: 11/1/2020  YOB: 1926  Medical Record Number: [id number]      History of Present Illness:      Review of Systems   Constitutional: Negative for activity change and appetite change. HENT: Negative for congestion. Respiratory: Negative for shortness of breath. Cardiovascular: Negative for chest pain. Gastrointestinal: Negative for constipation and diarrhea. Genitourinary: Negative for frequency. Neurological: Negative for seizures. Psychiatric/Behavioral: Negative for behavioral problems.        Review of Systems: All 14 review of systems negative other than as stated above    Social History     Tobacco Use    Smoking status: Unknown If Ever Smoked    Smokeless tobacco: Never Used   Substance Use Topics    Alcohol use: No    Drug use: Not on file         Past Medical History:   Diagnosis Date    CAD (coronary artery disease)     Combined hyperlipidemia     [facility]2 [facility] 2) [facility] [address]     [facility]     Hypertension     Hypothyroidism     Phantom limb syndrome ([facility] [address] 9/23/2017    PVD (peripheral vascular disease) ([facility] [address]            Past Surgical History:   Procedure Laterality Date    ABOVE KNEE AMPUTATION      left    APPENDECTOMY      CARPAL TUNNEL RELEASE Right 9/16/2020    RIGHT WRIST CARPAL TUNNEL RELEASE performed by Laz Tyler DO at [facility]      right         Current Outpatient Medications on File Prior to Visit   Medication Sig Dispense Refill    ELIQUIS 2.5 MG TABS tablet Take 2.5 mg by mouth 2 times daily      gabapentin (NEURONTIN) 100 MG capsule Take 2 capsules by mouth nightly 180 capsule 3    gabapentin (NEURONTIN) 100 MG capsule Take 1 capsule by mouth daily 90 capsule 3    insulin glargine (LANTUS SOLOSTAR) 100 UNIT/ML injection pen Inject 40 Units into the skin nightly 5 Pen 3    insulin aspart (NOVOLOG FLEXPEN) 100 UNIT/ML injection pen Inject 9 Units into the skin daily (with breakfast) 5 Pen 3    insulin aspart (NOVOLOG FLEXPEN) 100 UNIT/ML injection pen Inject 9 Units into the skin Daily with lunch 5 Pen 3    insulin aspart (NOVOLOG FLEXPEN) 100 UNIT/ML injection pen Inject 7 Units into the skin Daily with supper 5 Pen 3    acetaminophen (TYLENOL) 500 MG tablet Take 500 mg by mouth 2 times daily      amLODIPine (NORVASC) 5 MG tablet Take 5 mg by mouth daily      polyvinyl alcohol (LIQUIFILM TEARS) 1.4 % ophthalmic solution Place 2 drops into both eyes 4 times daily as needed      aspirin 81 MG tablet Take 81 mg by mouth daily      atorvastatin (LIPITOR) 40 MG tablet Take 40 mg by mouth daily      Cranberry (AZO CRANBERRY GUMMIES) 500 MG CHEW Take by mouth 2 times daily      letrozole (FEMARA) 2.5 MG tablet Take 2.5 mg by mouth daily      levothyroxine (LEVOTHROID) 125 MCG tablet Take 125 mcg by mouth Daily EVERY SUN, TUE, THUR, SAT      lisinopril (PRINIVIL;ZESTRIL) 40 MG tablet Take 40 mg by mouth daily      loperamide (IMODIUM) 2 MG capsule Take 2 mg by mouth every 6 hours as needed for Diarrhea      metoprolol tartrate (LOPRESSOR) 25 MG tablet Take 25 mg by mouth 2 times daily      omeprazole (PRILOSEC) 20 MG delayed release capsule Take 20 mg by mouth daily      ondansetron (ZOFRAN) 4 MG tablet Take 4 mg by mouth every 8 hours as needed for Nausea or Vomiting      sodium chloride (OCEAN, BABY AYR) 0.65 % nasal spray 2 sprays by Nasal route as needed for Congestion      Cholecalciferol (VITAMIN D3) 1000 units TABS Take 2,000 Units by mouth daily       No current facility-administered medications on file prior to visit. Allergies   Allergen Reactions    Pcn [Penicillins] Rash     50 plus years ago         No family history on file. Physical Exam:      Physical Exam   Constitutional: She is oriented to person, place, and time. She appears well-nourished.  No

## 2020-11-03 RX ORDER — OXYCODONE HYDROCHLORIDE AND ACETAMINOPHEN 5; 325 MG/1; MG/1
1 TABLET ORAL 2 TIMES DAILY PRN
Qty: 60 TABLET | Refills: 0 | Status: SHIPPED | OUTPATIENT
Start: 2020-11-03 | End: 2020-12-01 | Stop reason: SDUPTHER

## 2020-11-20 ENCOUNTER — OFFICE VISIT (OUTPATIENT)
Dept: GERIATRIC MEDICINE | Age: 85
End: 2020-11-20
Payer: MEDICARE

## 2020-11-20 DIAGNOSIS — E11.9 CONTROLLED TYPE 2 DIABETES MELLITUS WITHOUT COMPLICATION, WITH LONG-TERM CURRENT USE OF INSULIN (HCC): ICD-10-CM

## 2020-11-20 DIAGNOSIS — Z79.4 CONTROLLED TYPE 2 DIABETES MELLITUS WITHOUT COMPLICATION, WITH LONG-TERM CURRENT USE OF INSULIN (HCC): ICD-10-CM

## 2020-11-20 DIAGNOSIS — G56.01 RIGHT CARPAL TUNNEL SYNDROME: Primary | ICD-10-CM

## 2020-11-20 DIAGNOSIS — I10 ESSENTIAL HYPERTENSION: ICD-10-CM

## 2020-11-20 PROCEDURE — 1123F ACP DISCUSS/DSCN MKR DOCD: CPT | Performed by: PHYSICIAN ASSISTANT

## 2020-11-20 PROCEDURE — G8484 FLU IMMUNIZE NO ADMIN: HCPCS | Performed by: PHYSICIAN ASSISTANT

## 2020-11-20 PROCEDURE — 99308 SBSQ NF CARE LOW MDM 20: CPT | Performed by: PHYSICIAN ASSISTANT

## 2020-11-20 NOTE — PROGRESS NOTES
PATIENT: Danilo Lira : 12/3/1926 DOS: 2020     [facility]    HPI: Vital signs reviewed, see [facility]. The patient is being seen today for right wrist carpal tunnel surgery follow up. The patient is doing slightly better. Strength is returning, improving. She still wears a brace intermittently. She is working with PT/OT for further strengthening stating that she is having trouble making a fist with some issues with her pain and mobility in the middle finger. Expressed to her that she needs to continue working with physical therapy and that due to her age recover ability is going to take a little bit more time to adequate recovery. She will follow up with surgeon as needed as well. She states otherwise she is doing well and does not want any further intervention at the moment. She is happy that she went through with surgical process; however, wishing that the results were slightly more immediate. She is willing to work patiently with PT, however. She has made significant improvement from my perspective as far as dexterity and strength in infected hand. She does state that pain is slightly improved. She states no further complaints at this time. We will follow up at later date for further progress report. Nursing staff reports no new concerns. MEDICATIONS:  Reviewed. ALLERGIES:  Reviewed. REVIEW OF SYSTEMS:  Constitutional:  No new fever, chills, nausea, vomiting, weakness, fatigue beyond baseline. Cardiopulmonary:  Denies any new CP, orthopnea, PND, RODRIGUEZ, claudication in lower leg. No new SOB, POI, or wheezing. MSK:  Mild improvement in overall strength and decrease in pain and right wrist albeit minimal.  Denies any further MSK related issues. Psychiatric:  The patient's mood is all stable. Good mood, cooperative, pleasant. Remaining 14-point ROS unremarkable. PHYSICAL EXAMINATION:  General:  Good hygiene, bright affect, very pleasant. Cardiopulmonary:  RRR. Intact distal pulses in her upper and lower periphery. No JVD noted. Lung sounds are clear to auscultation. MSK/Skin:  Area of surgery is clean, dry, no sequelae. No warmth, redness noted. Good range of motion. No tenderness to palpation throughout affected area.  strength has improved mildly. Her dexterity seems to have improved as well on visualization of her doing routine tasks. ASSESSMENT AND PLAN:  Status post right wrist carpal tunnel surgery Making adequate improvement. Continue with PT/OT order.           Electronically Signed By: Sixto Santamaria PA-C on 11/10/2020 08:33:35  ______________________________  Sixto Santamaria PA-C  LT/[id number]  D: 11/09/2020 12:02:20  T: 11/10/2020 08:06:41    cc: - [employer]

## 2020-11-30 ENCOUNTER — OFFICE VISIT (OUTPATIENT)
Dept: GERIATRIC MEDICINE | Age: 85
End: 2020-11-30
Payer: MEDICARE

## 2020-11-30 DIAGNOSIS — G56.01 RIGHT CARPAL TUNNEL SYNDROME: Primary | ICD-10-CM

## 2020-11-30 DIAGNOSIS — R20.2 NUMBNESS AND TINGLING IN RIGHT HAND: ICD-10-CM

## 2020-11-30 DIAGNOSIS — R20.0 NUMBNESS AND TINGLING IN RIGHT HAND: ICD-10-CM

## 2020-11-30 PROCEDURE — 1123F ACP DISCUSS/DSCN MKR DOCD: CPT | Performed by: PHYSICIAN ASSISTANT

## 2020-11-30 PROCEDURE — G8484 FLU IMMUNIZE NO ADMIN: HCPCS | Performed by: PHYSICIAN ASSISTANT

## 2020-11-30 PROCEDURE — 99308 SBSQ NF CARE LOW MDM 20: CPT | Performed by: PHYSICIAN ASSISTANT

## 2020-12-01 RX ORDER — OXYCODONE HYDROCHLORIDE AND ACETAMINOPHEN 5; 325 MG/1; MG/1
1 TABLET ORAL 2 TIMES DAILY PRN
Qty: 60 TABLET | Refills: 0 | Status: SHIPPED | OUTPATIENT
Start: 2020-12-01 | End: 2021-01-05 | Stop reason: SDUPTHER

## 2020-12-03 LAB
BUN BLDV-MCNC: 30 MG/DL
CALCIUM SERPL-MCNC: 8.8 MG/DL
CHLORIDE BLD-SCNC: 111 MMOL/L
CO2: 22 MMOL/L
CREAT SERPL-MCNC: 1.5 MG/DL
GFR CALCULATED: NORMAL
GLUCOSE BLD-MCNC: 41 MG/DL
POTASSIUM SERPL-SCNC: 4.2 MMOL/L
SODIUM BLD-SCNC: 141 MMOL/L

## 2020-12-17 ASSESSMENT — ENCOUNTER SYMPTOMS
CONSTIPATION: 0
SHORTNESS OF BREATH: 0
DIARRHEA: 0

## 2021-01-05 DIAGNOSIS — G89.29 CHRONIC LEFT SHOULDER PAIN: ICD-10-CM

## 2021-01-05 DIAGNOSIS — M15.9 OSTEOARTHRITIS OF MULTIPLE JOINTS, UNSPECIFIED OSTEOARTHRITIS TYPE: ICD-10-CM

## 2021-01-05 DIAGNOSIS — M25.512 CHRONIC LEFT SHOULDER PAIN: ICD-10-CM

## 2021-01-05 DIAGNOSIS — G89.29 OTHER CHRONIC PAIN: ICD-10-CM

## 2021-01-06 ENCOUNTER — OFFICE VISIT (OUTPATIENT)
Dept: GERIATRIC MEDICINE | Age: 86
End: 2021-01-06
Payer: MEDICARE

## 2021-01-06 DIAGNOSIS — Z79.4 CONTROLLED TYPE 2 DIABETES MELLITUS WITHOUT COMPLICATION, WITH LONG-TERM CURRENT USE OF INSULIN (HCC): ICD-10-CM

## 2021-01-06 DIAGNOSIS — U07.1 COVID-19 VIRUS INFECTION: ICD-10-CM

## 2021-01-06 DIAGNOSIS — I73.9 PAD (PERIPHERAL ARTERY DISEASE) (HCC): Primary | ICD-10-CM

## 2021-01-06 DIAGNOSIS — I10 ESSENTIAL HYPERTENSION: ICD-10-CM

## 2021-01-06 DIAGNOSIS — E11.9 CONTROLLED TYPE 2 DIABETES MELLITUS WITHOUT COMPLICATION, WITH LONG-TERM CURRENT USE OF INSULIN (HCC): ICD-10-CM

## 2021-01-06 PROCEDURE — 1123F ACP DISCUSS/DSCN MKR DOCD: CPT | Performed by: PHYSICIAN ASSISTANT

## 2021-01-06 PROCEDURE — G8484 FLU IMMUNIZE NO ADMIN: HCPCS | Performed by: PHYSICIAN ASSISTANT

## 2021-01-06 PROCEDURE — 99309 SBSQ NF CARE MODERATE MDM 30: CPT | Performed by: PHYSICIAN ASSISTANT

## 2021-01-06 RX ORDER — OXYCODONE HYDROCHLORIDE AND ACETAMINOPHEN 5; 325 MG/1; MG/1
1 TABLET ORAL 2 TIMES DAILY PRN
Qty: 60 TABLET | Refills: 0 | Status: SHIPPED | OUTPATIENT
Start: 2021-01-06 | End: 2021-02-05

## 2021-01-10 ASSESSMENT — ENCOUNTER SYMPTOMS
NAUSEA: 0
DIARRHEA: 0
VOMITING: 0
CONSTIPATION: 0
ABDOMINAL PAIN: 0
RESPIRATORY NEGATIVE: 1
ABDOMINAL DISTENTION: 0

## 2021-01-11 ENCOUNTER — VIRTUAL VISIT (OUTPATIENT)
Dept: GERIATRIC MEDICINE | Age: 86
End: 2021-01-11
Payer: MEDICARE

## 2021-01-11 DIAGNOSIS — U07.1 COVID-19 VIRUS INFECTION: Primary | ICD-10-CM

## 2021-01-11 DIAGNOSIS — R09.02 HYPOXIA: ICD-10-CM

## 2021-01-11 PROCEDURE — 1123F ACP DISCUSS/DSCN MKR DOCD: CPT | Performed by: PHYSICIAN ASSISTANT

## 2021-01-11 PROCEDURE — G8484 FLU IMMUNIZE NO ADMIN: HCPCS | Performed by: PHYSICIAN ASSISTANT

## 2021-01-11 PROCEDURE — 99308 SBSQ NF CARE LOW MDM 20: CPT | Performed by: PHYSICIAN ASSISTANT

## 2021-01-11 NOTE — PROGRESS NOTES
Geriatric Diabetes Specific Evaluation   Patient: Axel Spencer  YOB: 1926  No chief complaint on file. Subjective: Axel Spencer is a 80 y.o. female being seen for a monthly Comprehensive Diabetic Assessment. Patient has a long standing history    Diagnosis Orders   1. Controlled type 2 diabetes mellitus without complication, with long-term current use of insulin ([facility])     2. Stage 3b chronic kidney disease      and requires review of blood sugars and review of their current medical management. Patient has been evaluated for hyper/hypoglycemic episodes. Nutritional intake has been reviewed with patient & staff. Patient's need for supplemental insulin coverage has been reviewed. Patient does complain of:   Increased fatigue Yes  Subjective weight loss/gain No  Subjective change in appetite No  Episodes of diaphoresis No  Subjective sense of worsening loss of sensation in distal extremities No  Polyuria No  Polydipsia No  Polydipsia Yes     History of known micro vascular disease:  Retinopathy-No  Nephropathy-Yes  Neuropathy-Yes  Gastroparesis-No    Current Outpatient Medications on File Prior to Visit   Medication Sig Dispense Refill    ELIQUIS 2.5 MG TABS tablet Take 2.5 mg by mouth 2 times daily      gabapentin (NEURONTIN) 100 MG capsule Take 2 capsules by mouth nightly 180 capsule 3    gabapentin (NEURONTIN) 100 MG capsule Take 1 capsule by mouth daily 90 capsule 3    insulin glargine (LANTUS SOLOSTAR) 100 UNIT/ML injection pen Inject 40 Units into the skin nightly 5 Pen 3    insulin aspart (NOVOLOG FLEXPEN) 100 UNIT/ML injection pen Inject 9 Units into the skin daily (with breakfast) 5 Pen 3    insulin aspart (NOVOLOG FLEXPEN) 100 UNIT/ML injection pen Inject 9 Units into the skin Daily with lunch 5 Pen 3    insulin aspart (NOVOLOG FLEXPEN) 100 UNIT/ML injection pen Inject 7 Units into the skin Daily with supper 5 Pen 3  acetaminophen (TYLENOL) 500 MG tablet Take 500 mg by mouth 2 times daily      amLODIPine (NORVASC) 5 MG tablet Take 5 mg by mouth daily      polyvinyl alcohol (LIQUIFILM TEARS) 1.4 % ophthalmic solution Place 2 drops into both eyes 4 times daily as needed      aspirin 81 MG tablet Take 81 mg by mouth daily      atorvastatin (LIPITOR) 40 MG tablet Take 40 mg by mouth daily      Cranberry (AZO CRANBERRY GUMMIES) 500 MG CHEW Take by mouth 2 times daily      letrozole (FEMARA) 2.5 MG tablet Take 2.5 mg by mouth daily      levothyroxine (LEVOTHROID) 125 MCG tablet Take 125 mcg by mouth Daily EVERY SUN, TUE, THUR, SAT      lisinopril (PRINIVIL;ZESTRIL) 40 MG tablet Take 40 mg by mouth daily      loperamide (IMODIUM) 2 MG capsule Take 2 mg by mouth every 6 hours as needed for Diarrhea      metoprolol tartrate (LOPRESSOR) 25 MG tablet Take 25 mg by mouth 2 times daily      omeprazole (PRILOSEC) 20 MG delayed release capsule Take 20 mg by mouth daily      ondansetron (ZOFRAN) 4 MG tablet Take 4 mg by mouth every 8 hours as needed for Nausea or Vomiting      sodium chloride (OCEAN, BABY AYR) 0.65 % nasal spray 2 sprays by Nasal route as needed for Congestion      Cholecalciferol (VITAMIN D3) 1000 units TABS Take 2,000 Units by mouth daily       No current facility-administered medications on file prior to visit.         Past Medical History:   Diagnosis Date    CAD (coronary artery disease)     Combined hyperlipidemia     DM2 [facility]diabetes mellitus, type 2) [facility]     DVT [facility]deep venous thrombosis) ([facility]     Hypertension     Hypothyroidism     Phantom limb syndrome ([facility] 9/23/2017    PVD (peripheral vascular disease) ([facility]        Past Surgical History:   Procedure Laterality Date    ABOVE KNEE AMPUTATION      left    APPENDECTOMY      CARPAL TUNNEL RELEASE Right 9/16/2020    RIGHT WRIST CARPAL TUNNEL RELEASE performed by Edgardo Cordova DO [facility]  CORONARY ANGIOPLASTY WITH STENT PLACEMENT      LEG AMPUTATION BELOW KNEE      right       Social History     Socioeconomic History    Marital status:      Spouse name: Not on file    Number of children: Not on file    Years of education: Not on file    Highest education level: Not on file   Occupational History    Not on file   Social Needs    Financial resource strain: Not on file    Food insecurity     Worry: Not on file     Inability: Not on file    Transportation needs     Medical: Not on file     Non-medical: Not on file   Tobacco Use    Smoking status: Unknown If Ever Smoked    Smokeless tobacco: Never Used   Substance and Sexual Activity    Alcohol use: No    Drug use: Not on file    Sexual activity: Not on file   Lifestyle    Physical activity     Days per week: Not on file     Minutes per session: Not on file    Stress: Not on file   Relationships    Social connections     Talks on phone: Not on file     Gets together: Not on file     Attends Tenriism service: Not on file     Active member of club or organization: Not on file     Attends meetings of clubs or organizations: Not on file     Relationship status: Not on file    Intimate partner violence     Fear of current or ex partner: Not on file     Emotionally abused: Not on file     Physically abused: Not on file     Forced sexual activity: Not on file   Other Topics Concern    Not on file   Social History Narrative    Not on file       No family history on file. ROS:  Review of Systems   Constitutional: Positive for fatigue. Negative for activity change, appetite change, chills, fever and unexpected weight change. HENT: Negative. Respiratory: Negative. Cardiovascular: Negative for chest pain, palpitations and leg swelling. Gastrointestinal: Negative for abdominal distention (minor, overweight), abdominal pain, constipation, diarrhea, nausea and vomiting. Endocrine: Positive for polydipsia (Not correlated to elevated Accucheck readings). Negative for polyphagia and polyuria. Genitourinary: Positive for frequency. Negative for decreased urine volume, difficulty urinating and urgency. Neurological: Positive for tremors, weakness and numbness. Negative for dizziness. Right hand/wrist carpal tunnel related pain/weakness/tingling   Psychiatric/Behavioral: Negative for agitation, behavioral problems, confusion and decreased concentration. All other systems reviewed and are negative. Objective:   Physical Examination:    There were no vitals taken for this visit. Physical Exam  Constitutional:       General: She is not in acute distress. Appearance: Normal appearance. She is normal weight. She is not ill-appearing. HENT:      Head: Normocephalic and atraumatic. Mouth/Throat:      Mouth: Mucous membranes are moist.      Pharynx: Oropharynx is clear. No oropharyngeal exudate or posterior oropharyngeal erythema. Eyes:      Extraocular Movements: Extraocular movements intact. Conjunctiva/sclera: Conjunctivae normal.      Pupils: Pupils are equal, round, and reactive to light. Cardiovascular:      Rate and Rhythm: Normal rate and regular rhythm. Pulses: Normal pulses. Heart sounds: Normal heart sounds. Pulmonary:      Effort: Pulmonary effort is normal.      Breath sounds: Normal breath sounds. Abdominal:      General: Bowel sounds are normal. There is no distension. Palpations: Abdomen is soft. Tenderness: There is no abdominal tenderness. There is no guarding. Skin:     General: Skin is warm and dry. Neurological:      General: No focal deficit present. Mental Status: She is alert and oriented to person, place, and time. Mental status is at baseline. Motor: Weakness present. Gait: Gait abnormal (LLE amputee).    Psychiatric:         Mood and Affect: Mood normal. Behavior: Behavior normal.         Thought Content: Thought content normal.          Laboratory Studies:   BMP  No results for input(s): NA, K, CL, CO2, BUN, CREATININE, GLUCOSE, CALCIUM in the last 72 hours. HgBA1c:    Lab Results   Component Value Date    LABA1C 6.1 08/03/2020     TSH:  Lab Results   Component Value Date    TSH 1.237 09/29/2016     Lipids:  Lab Results   Component Value Date    CHOL 101 06/01/2020    HDL 32 06/01/2020    TRIG 165 06/01/2020       Assessment & Plan   Prior blood sugar trends reviewed with onsite EMR. HBA1C trends reviewed in onsite EMR. Renal function trends reviewed in onsite EMR. Lipid trends reviewed in onsite EMR. Microalbumin reviewed in onsite EMR  Diabetic regimen reviewed with nursing staff. Last endocrinologist visit reviewed. No No recent endo visit on file. Patient requires follow ophthalmologic screen for retinopathy. No  Refused. Patient would benefit from dedicated neuropathy survey. Yes  Patient requires follow up HBA1C in 3/6 months. No  Patient requires follow up urine for micro albumin annually. Yes  Is patient candidate for low dose ACE Inhibitor? No BP w controlled currently. Plan of Care discussed with nursing staff. Yes  Plan of Care discussed with patient or responsible parties.  Yes  Details of medication adjustment: Patient is doing well overall with a A1c of 6.1% as of 8/3/2020. She is currently on Humalog 11 units prior to meals at breakfast and lunch with 9 units prior to meals at dinner. Parameters in place to hold for less than number 100 mg/DL with Accu-Chek prior to meal.  She also receives Lantus 33 units nightly. She has had the same appetite since last discussion. Does not use to excess amount of sweets or high sugar content foods. Maintains good diet. Occasional pizza or high carbohydrate meal when family treats her; rare. Will recheck A1c in the next 6 months. Will do insulin dose adjustments as needed. Patient does have CKD and thus is not a good candidate for many oral DM meds. Patient denies any new pain or tingling bilateral upper periphery, or right lower leg. Patient is content with current gabapentin orders. Unwilling to risk further CKD progression for minimal gains in symptom relief. Content with current symptoms. We will follow-up in 6 months with next A1c and discuss further treatment goals then.         Emily Grande, [id number]

## 2021-01-12 ENCOUNTER — APPOINTMENT (OUTPATIENT)
Dept: GENERAL RADIOLOGY | Age: 86
End: 2021-01-12
Payer: MEDICARE

## 2021-01-12 ENCOUNTER — HOSPITAL ENCOUNTER (EMERGENCY)
Age: 86
Discharge: HOME | End: 2021-01-12
Attending: EMERGENCY MEDICINE
Payer: MEDICARE

## 2021-01-12 VITALS
TEMPERATURE: 98.7 F | SYSTOLIC BLOOD PRESSURE: 107 MMHG | RESPIRATION RATE: 24 BRPM | HEART RATE: 83 BPM | DIASTOLIC BLOOD PRESSURE: 41 MMHG | OXYGEN SATURATION: 92 %

## 2021-01-12 DIAGNOSIS — U07.1 ACUTE HYPOXEMIC RESPIRATORY FAILURE DUE TO COVID-19 (HCC): ICD-10-CM

## 2021-01-12 DIAGNOSIS — J96.01 ACUTE HYPOXEMIC RESPIRATORY FAILURE DUE TO COVID-19 (HCC): ICD-10-CM

## 2021-01-12 DIAGNOSIS — U07.1 PNEUMONIA DUE TO COVID-19 VIRUS: Primary | ICD-10-CM

## 2021-01-12 DIAGNOSIS — J12.82 PNEUMONIA DUE TO COVID-19 VIRUS: Primary | ICD-10-CM

## 2021-01-12 LAB
ALBUMIN SERPL-MCNC: 3.1 G/DL (ref 3.5–4.6)
ALP BLD-CCNC: 248 U/L (ref 40–130)
ALT SERPL-CCNC: 35 U/L (ref 0–33)
ANION GAP SERPL CALCULATED.3IONS-SCNC: 18 MEQ/L (ref 9–15)
AST SERPL-CCNC: 71 U/L (ref 0–35)
BASOPHILS ABSOLUTE: 0.1 K/UL (ref 0–0.2)
BASOPHILS ABSOLUTE: ABNORMAL
BASOPHILS RELATIVE PERCENT: 0.3 %
BASOPHILS RELATIVE PERCENT: ABNORMAL
BILIRUB SERPL-MCNC: 0.4 MG/DL (ref 0.2–0.7)
BUN BLDV-MCNC: 42 MG/DL
BUN BLDV-MCNC: 64 MG/DL (ref 8–23)
CALCIUM SERPL-MCNC: 8.2 MG/DL
CALCIUM SERPL-MCNC: 9.7 MG/DL (ref 8.5–9.9)
CHLORIDE BLD-SCNC: 103 MMOL/L
CHLORIDE BLD-SCNC: 96 MEQ/L (ref 95–107)
CO2: 17 MEQ/L (ref 20–31)
CO2: 19 MMOL/L
CREAT SERPL-MCNC: 2 MG/DL
CREAT SERPL-MCNC: 3.43 MG/DL (ref 0.5–0.9)
EOSINOPHILS ABSOLUTE: 0 K/UL (ref 0–0.7)
EOSINOPHILS ABSOLUTE: ABNORMAL
EOSINOPHILS RELATIVE PERCENT: 0.1 %
EOSINOPHILS RELATIVE PERCENT: ABNORMAL
GFR AFRICAN AMERICAN: 15.1
GFR CALCULATED: NORMAL
GFR NON-AFRICAN AMERICAN: 12.4
GLOBULIN: 4 G/DL (ref 2.3–3.5)
GLUCOSE BLD-MCNC: 135 MG/DL (ref 70–99)
GLUCOSE BLD-MCNC: 167 MG/DL
HCT VFR BLD CALC: 30.5 % (ref 36–46)
HCT VFR BLD CALC: 32 % (ref 37–47)
HEMOGLOBIN: 9.8 G/DL (ref 12–16)
HEMOGLOBIN: 9.9 G/DL (ref 12–16)
LACTIC ACID: 2.8 MMOL/L (ref 0.5–2.2)
LYMPHOCYTES ABSOLUTE: 1.1 K/UL (ref 1–4.8)
LYMPHOCYTES ABSOLUTE: ABNORMAL
LYMPHOCYTES RELATIVE PERCENT: 5.8 %
LYMPHOCYTES RELATIVE PERCENT: ABNORMAL
MAGNESIUM: 2.3 MG/DL (ref 1.7–2.4)
MCH RBC QN AUTO: 29.2 PG (ref 27–31.3)
MCH RBC QN AUTO: 29.5 PG
MCHC RBC AUTO-ENTMCNC: 31 % (ref 33–37)
MCHC RBC AUTO-ENTMCNC: 32.3 G/DL
MCV RBC AUTO: 91.5 FL
MCV RBC AUTO: 94.3 FL (ref 82–100)
MONOCYTES ABSOLUTE: 0.7 K/UL (ref 0.2–0.8)
MONOCYTES ABSOLUTE: ABNORMAL
MONOCYTES RELATIVE PERCENT: 3.6 %
MONOCYTES RELATIVE PERCENT: ABNORMAL
NEUTROPHILS ABSOLUTE: 17.1 K/UL (ref 1.4–6.5)
NEUTROPHILS ABSOLUTE: ABNORMAL
NEUTROPHILS RELATIVE PERCENT: 90.2 %
NEUTROPHILS RELATIVE PERCENT: ABNORMAL
PDW BLD-RTO: 14.6 % (ref 11.5–14.5)
PLATELET # BLD: 268 K/ΜL
PLATELET # BLD: 311 K/UL (ref 130–400)
PMV BLD AUTO: 9.6 FL
POTASSIUM SERPL-SCNC: 4.5 MEQ/L (ref 3.4–4.9)
POTASSIUM SERPL-SCNC: 4.9 MMOL/L
RBC # BLD: 3.33 10^6/ΜL
RBC # BLD: 3.39 M/UL (ref 4.2–5.4)
SODIUM BLD-SCNC: 131 MEQ/L (ref 135–144)
SODIUM BLD-SCNC: 136 MMOL/L
TOTAL PROTEIN: 7.1 G/DL (ref 6.3–8)
WBC # BLD: 19 K/UL (ref 4.8–10.8)
WBC # BLD: 19.4 10^3/ML

## 2021-01-12 PROCEDURE — 83605 ASSAY OF LACTIC ACID: CPT

## 2021-01-12 PROCEDURE — 87149 DNA/RNA DIRECT PROBE: CPT

## 2021-01-12 PROCEDURE — 96375 TX/PRO/DX INJ NEW DRUG ADDON: CPT

## 2021-01-12 PROCEDURE — 96365 THER/PROPH/DIAG IV INF INIT: CPT

## 2021-01-12 PROCEDURE — 99283 EMERGENCY DEPT VISIT LOW MDM: CPT

## 2021-01-12 PROCEDURE — 80053 COMPREHEN METABOLIC PANEL: CPT

## 2021-01-12 PROCEDURE — 87040 BLOOD CULTURE FOR BACTERIA: CPT

## 2021-01-12 PROCEDURE — 83735 ASSAY OF MAGNESIUM: CPT

## 2021-01-12 PROCEDURE — 6360000002 HC RX W HCPCS: Performed by: EMERGENCY MEDICINE

## 2021-01-12 PROCEDURE — 2580000003 HC RX 258: Performed by: EMERGENCY MEDICINE

## 2021-01-12 PROCEDURE — 85025 COMPLETE CBC W/AUTO DIFF WBC: CPT

## 2021-01-12 PROCEDURE — 71045 X-RAY EXAM CHEST 1 VIEW: CPT

## 2021-01-12 RX ORDER — BENZONATATE 100 MG/1
100 CAPSULE ORAL 2 TIMES DAILY PRN
Qty: 20 CAPSULE | Refills: 0 | Status: SHIPPED | OUTPATIENT
Start: 2021-01-12 | End: 2021-01-19

## 2021-01-12 RX ORDER — DEXAMETHASONE SODIUM PHOSPHATE 4 MG/ML
4 INJECTION, SOLUTION INTRA-ARTICULAR; INTRALESIONAL; INTRAMUSCULAR; INTRAVENOUS; SOFT TISSUE ONCE
Status: COMPLETED | OUTPATIENT
Start: 2021-01-12 | End: 2021-01-12

## 2021-01-12 RX ORDER — GUAIFENESIN 600 MG/1
1200 TABLET, EXTENDED RELEASE ORAL 2 TIMES DAILY
Qty: 40 TABLET | Refills: 0 | Status: SHIPPED | OUTPATIENT
Start: 2021-01-12 | End: 2021-01-22

## 2021-01-12 RX ORDER — 0.9 % SODIUM CHLORIDE 0.9 %
1000 INTRAVENOUS SOLUTION INTRAVENOUS ONCE
Status: COMPLETED | OUTPATIENT
Start: 2021-01-12 | End: 2021-01-12

## 2021-01-12 RX ADMIN — DEXAMETHASONE SODIUM PHOSPHATE 4 MG: 4 INJECTION, SOLUTION INTRAMUSCULAR; INTRAVENOUS at 19:35

## 2021-01-12 RX ADMIN — SODIUM CHLORIDE 1000 ML: 9 INJECTION, SOLUTION INTRAVENOUS at 19:34

## 2021-01-12 RX ADMIN — AZITHROMYCIN MONOHYDRATE 500 MG: 500 INJECTION, POWDER, LYOPHILIZED, FOR SOLUTION INTRAVENOUS at 19:35

## 2021-01-12 ASSESSMENT — ENCOUNTER SYMPTOMS
COUGH: 1
DIARRHEA: 0
COLOR CHANGE: 0
SINUS PRESSURE: 0
CONSTIPATION: 0
VOMITING: 0
SHORTNESS OF BREATH: 1
WHEEZING: 0
NAUSEA: 0
RHINORRHEA: 0
ABDOMINAL PAIN: 0
APNEA: 0
ABDOMINAL DISTENTION: 0
EYE PAIN: 0
PHOTOPHOBIA: 0
SORE THROAT: 0
BACK PAIN: 0

## 2021-01-13 ENCOUNTER — OFFICE VISIT (OUTPATIENT)
Dept: GERIATRIC MEDICINE | Age: 86
End: 2021-01-13
Payer: MEDICARE

## 2021-01-13 DIAGNOSIS — U07.1 COVID-19: Primary | ICD-10-CM

## 2021-01-13 PROCEDURE — 1123F ACP DISCUSS/DSCN MKR DOCD: CPT | Performed by: INTERNAL MEDICINE

## 2021-01-13 PROCEDURE — 99309 SBSQ NF CARE MODERATE MDM 30: CPT | Performed by: INTERNAL MEDICINE

## 2021-01-13 PROCEDURE — G8484 FLU IMMUNIZE NO ADMIN: HCPCS | Performed by: INTERNAL MEDICINE

## 2021-01-13 NOTE — ED NOTES
[facility] called for transport back to [facility]. ETA 'awhile'.      Cristela Wesley RN  01/12/21 2025

## 2021-01-13 NOTE — ED PROVIDER NOTES
[facility] ED  eMERGENCY dEPARTMENT eNCOUnter      Pt Name: Kareem Redding  MRN: [id number]  [place] 12/3/1926  Date of evaluation: 1/12/2021  Provider: Vamshi Brooks MD    CHIEF COMPLAINT       Chief Complaint   Patient presents with    Positive For Covid-19     sent from [facility]         HISTORY OF PRESENT ILLNESS   (Location/Symptom, Timing/Onset,Context/Setting, Quality, Duration, Modifying Factors, Severity)  Note limiting factors. Kareem Redding is a 80 y.o. female who presents to the emergency department with complaint of hypoxia, some respiratory distress. Patient tested Covid + January 6. She also was diagnosed with pneumonia from chest x-ray completed this morning. Her laboratory studies showed white count of more than 20,000. She is currently on Cipro. Pulse ox was in the 80s. She is afebrile and vital signs are stable. She denies any other systemic symptoms. She has nonproductive cough. Comorbid conditions include diabetes, coronary artery disease, hyperlipidemia, peripheral vascular disease, thromboembolism, both thyroidism and hypertension. She is status post right BKA, chronic kidney failure and left AKA. HPI    Nursing Notes were reviewed. REVIEW OF SYSTEMS    (2-9 systems for level 4, 10 or more for level 5)     Review of Systems   Constitutional: Positive for fatigue. Negative for activity change, appetite change, chills and fever. HENT: Negative for congestion, ear discharge, ear pain, hearing loss, rhinorrhea, sinus pressure and sore throat. Eyes: Negative for photophobia, pain and visual disturbance. Respiratory: Positive for cough and shortness of breath. Negative for apnea and wheezing. Cardiovascular: Negative for chest pain, palpitations and leg swelling. Gastrointestinal: Negative for abdominal distention, abdominal pain, constipation, diarrhea, nausea and vomiting.    Endocrine: Negative for cold intolerance, heat intolerance and polyuria. Genitourinary: Negative for dysuria, flank pain, frequency and urgency. Musculoskeletal: Positive for arthralgias and myalgias. Negative for back pain, gait problem and neck stiffness. Skin: Negative for color change, pallor and rash. Allergic/Immunologic: Negative for food allergies and immunocompromised state. Neurological: Negative for dizziness, tremors, syncope, weakness, light-headedness and headaches. Psychiatric/Behavioral: Negative for agitation, confusion and hallucinations. All other systems reviewed and are negative. Except as noted above the remainder of the review of systems was reviewed and negative. PAST MEDICAL HISTORY     Past Medical History:   Diagnosis Date    CAD (coronary artery disease)     Combined hyperlipidemia     [facility]2 [facility] type 2) [facility] [address]     [facility] thrombosis) [facility]     Hypertension     Hypothyroidism     Phantom limb syndrome ([facility] [address] 9/23/2017    [facility]          SURGICAL HISTORY       Past Surgical History:   Procedure Laterality Date    ABOVE KNEE AMPUTATION      left    APPENDECTOMY      CARPAL TUNNEL RELEASE Right 9/16/2020    RIGHT WRIST CARPAL TUNNEL RELEASE performed by Phoebe Peters DO at [place] [address]. STENT PLACEMENT      LEG AMPUTATION BELOW KNEE      right         CURRENT MEDICATIONS       Discharge Medication List as of 1/12/2021  8:27 PM      CONTINUE these medications which have NOT CHANGED    Details   oxyCODONE-acetaminophen (PERCOCET) 5-325 MG per tablet Take 1 tablet by mouth 2 times daily as needed for Pain for up to 30 days. , Disp-60 tablet, R-0Normal      ELIQUIS 2.5 MG TABS tablet Take 2.5 mg by mouth 2 times daily, DAWHistorical Med      !! gabapentin (NEURONTIN) 100 MG capsule Take 2 capsules by mouth nightly, Disp-180 capsule, R-3NO PRINT      !! gabapentin (NEURONTIN) 100 MG capsule Take 1 capsule by mouth daily, Disp-90 capsule, R-3NO PRINT      insulin glargine (LANTUS SOLOSTAR) 100 UNIT/ML injection pen Inject 40 Units into the skin nightly, Disp-5 Pen, R-3NO PRINT      !! insulin aspart (NOVOLOG FLEXPEN) 100 UNIT/ML injection pen Inject 9 Units into the skin daily (with breakfast), Disp-5 Pen, R-3NO PRINT      !! insulin aspart (NOVOLOG FLEXPEN) 100 UNIT/ML injection pen Inject 9 Units into the skin Daily with lunch, Disp-5 Pen, R-3NO PRINT      !! insulin aspart (NOVOLOG FLEXPEN) 100 UNIT/ML injection pen Inject 7 Units into the skin Daily with supper, Disp-5 Pen, R-3NO PRINT      acetaminophen (TYLENOL) 500 MG tablet Take 500 mg by mouth 2 times dailyHistorical Med      amLODIPine (NORVASC) 5 MG tablet Take 5 mg by mouth dailyHistorical Med      polyvinyl alcohol (LIQUIFILM TEARS) 1.4 % ophthalmic solution Place 2 drops into both eyes 4 times daily as neededHistorical Med      aspirin 81 MG tablet Take 81 mg by mouth dailyHistorical Med      atorvastatin (LIPITOR) 40 MG tablet Take 40 mg by mouth dailyHistorical Med      Cranberry (AZO CRANBERRY GUMMIES) 500 MG CHEW Take by mouth 2 times dailyHistorical Med      letrozole (FEMARA) 2.5 MG tablet Take 2.5 mg by mouth dailyHistorical Med      levothyroxine (LEVOTHROID) 125 MCG tablet Take 125 mcg by mouth Daily EVERY SUN, TUE, THUR, SATHistorical Med      lisinopril (PRINIVIL;ZESTRIL) 40 MG tablet Take 40 mg by mouth dailyHistorical Med      loperamide (IMODIUM) 2 MG capsule Take 2 mg by mouth every 6 hours as needed for DiarrheaHistorical Med      metoprolol tartrate (LOPRESSOR) 25 MG tablet Take 25 mg by mouth 2 times dailyHistorical Med      omeprazole (PRILOSEC) 20 MG delayed release capsule Take 20 mg by mouth dailyHistorical Med      ondansetron (ZOFRAN) 4 MG tablet Take 4 mg by mouth every 8 hours as needed for Nausea or VomitingHistorical Med      sodium chloride (OCEAN, BABY AYR) 0.65 % nasal spray 2 sprays by Nasal route as needed for CongestionHistorical Med Cholecalciferol (VITAMIN D3) 1000 units TABS Take 2,000 Units by mouth dailyHistorical Med       !! - Potential duplicate medications found. Please discuss with provider. ALLERGIES     Pcn [penicillins]    FAMILY HISTORY     History reviewed. No pertinent family history. SOCIAL HISTORY       Social History     Socioeconomic History    Marital status:      Spouse name: None    Number of children: None    Years of education: None    Highest education level: None   Occupational History    None   Social Needs    Financial resource strain: None    Food insecurity     Worry: None     Inability: None    Transportation needs     Medical: None     Non-medical: None   Tobacco Use    Smoking status: Unknown If Ever Smoked    Smokeless tobacco: Never Used   Substance and Sexual Activity    Alcohol use: No    Drug use: Not Currently    Sexual activity: None   Lifestyle    Physical activity     Days per week: None     Minutes per session: None    Stress: None   Relationships    Social connections     Talks on phone: None     Gets together: None     Attends Sabianist service: None     Active member of club or organization: None     Attends meetings of clubs or organizations: None     Relationship status: None    Intimate partner violence     Fear of current or ex partner: None     Emotionally abused: None     Physically abused: None     Forced sexual activity: None   Other Topics Concern    None   Social History Narrative    None       SCREENINGS             PHYSICAL EXAM    (up to 7 for level 4, 8 or more for level 5)     ED Triage Vitals [01/12/21 1927]   BP Temp Temp Source Pulse Resp SpO2 Height Weight   (!) 101/38 98.7 °F (37.1 °C) Temporal 83 24 93 % -- --       Physical Exam  Vitals signs and nursing note reviewed. Constitutional:       General: She is not in acute distress. Appearance: Normal appearance. She is well-developed. She is ill-appearing.  She is not toxic-appearing place, and time. Mental status is at baseline. Cranial Nerves: No cranial nerve deficit. Sensory: No sensory deficit. Motor: Weakness present. No abnormal muscle tone. Coordination: Coordination normal.      Gait: Gait abnormal.      Deep Tendon Reflexes: Reflexes are normal and symmetric. Reflexes normal.   Psychiatric:         Mood and Affect: Mood normal.         Behavior: Behavior normal.         Thought Content: Thought content normal.         Judgment: Judgment normal.         DIAGNOSTIC RESULTS     EKG: All EKG's are interpreted by the Emergency Department Physician who either signs or Co-signs this chart in the absence of a cardiologist.    Twelve-lead EKG [facility] showed sinus rhythm, ventricular response rate of 80/min, leftward axis, normal intervals, poor R wave progression, no acute ST-T wave changes. RADIOLOGY:   Non-plain film images such as CT, Ultrasound and MRI are read by the radiologist. Chelsea Santos radiographicimages are visualized and preliminarily interpreted by the emergency physician with the below findings:    Chest x-ray shows bilateral peripheral infiltrates consistent with Covid pneumonia.     Interpretation per the Radiologist below, if available at the time of this note:    XR CHEST PORTABLE    (Results Pending)         ED BEDSIDE ULTRASOUND:   Performed by ED Physician - none    LABS:  Labs Reviewed   COMPREHENSIVE METABOLIC PANEL - Abnormal; Notable for the following components:       Result Value    Sodium 131 (*)     CO2 17 (*)     Anion Gap 18 (*)     Glucose 135 (*)     BUN 64 (*)     CREATININE 3.43 (*)     GFR Non- 12.4 (*)     GFR  15.1 (*)     Alb 3.1 (*)     Alkaline Phosphatase 248 (*)     ALT 35 (*)     AST 71 (*)     Globulin 4.0 (*)     All other components within normal limits   CBC WITH AUTO DIFFERENTIAL - Abnormal; Notable for the following components:    WBC 19.0 (*)     RBC 3.39 (*)     Hemoglobin 9.9 (*) Hematocrit 32.0 (*)     MCHC 31.0 (*)     RDW 14.6 (*)     Neutrophils Absolute 17.1 (*)     All other components within normal limits   LACTIC ACID, PLASMA - Abnormal; Notable for the following components:    Lactic Acid 2.8 (*)     All other components within normal limits   CULTURE, BLOOD 1   CULTURE, BLOOD 2   MAGNESIUM       All other labs were within normal range or not returned as of this dictation. EMERGENCY DEPARTMENT COURSE and DIFFERENTIALDIAGNOSIS/MDM:    Differential diagnosis includes  #1 COVID-19 positive test (U07.1, COVID-19) with Acute Pneumonia (J12.89, Other viral pneumonia)  (If respiratory failure or sepsis present, add as separate assessment)  #2  Respiratory failure secondary to Covid pneumonia. #3 sepsis    Patient was treated in the ED with IV Decadron and IV Zithromax. She was also started on oxygen supplementation with pulse ox in the low 90s. Patient's daughter Amara Love called from [place]. Her care was discussed with her and she updated [place] [facility] [place] [place]  comfort measures only. CODE STATUS was accordingly changed. She will be discharged back to nursing home on Zithromax, Decadron, oxygen supplementation and comfort measures. Vitals:    Vitals:    01/12/21 1927 01/12/21 2015   BP: (!) 101/38 (!) 107/41   Pulse: 83    Resp: 24    Temp: 98.7 °F (37.1 °C)    TempSrc: Temporal    SpO2: 93% 92%           MDM  Number of Diagnoses or Management Options     Amount and/or Complexity of Data Reviewed  Clinical lab tests: reviewed and ordered  Tests in the radiology section of CPT®: reviewed and ordered  Tests in the medicine section of CPT®: ordered and reviewed    Risk of Complications, Morbidity, and/or Mortality  Presenting problems: moderate  Diagnostic procedures: moderate  Management options: moderate    Patient Progress  Patient progress: improved      CRITICAL CARE TIME   Total Critical Care time was  minutes, excluding separately reportable procedures.   There was a high probability of clinically significant/life threatening deterioration in the patient's condition which required my urgentintervention. CONSULTS:  None    PROCEDURES:  Unless otherwise noted below, none     Procedures    FINAL IMPRESSION      1. Pneumonia due to COVID-19 virus    2.  Acute hypoxemic respiratory failure due to COVID-19 [facility])          DISPOSITION/PLAN   DISPOSITION        PATIENT REFERRED TO:  MD Lopez Newell  [address] [place] [address]  [phone]    In 3 days        DISCHARGE MEDICATIONS:  Discharge Medication List as of 1/12/2021  8:27 PM      START taking these medications    Details   azithromycin (ZITHROMAX) 100 MG/5ML suspension Take 12.5 mLs by mouth daily for 5 days, Disp-62.5 mL, R-0Print      dexamethasone (DEXAMETHASONE INTENSOL) 1 MG/ML solution Take 4 mLs by mouth 2 times daily for 10 days, Disp-80 mL, R-0Print      guaiFENesin (MUCINEX) 600 MG extended release tablet Take 2 tablets by mouth 2 times daily for 10 days, Disp-40 tablet, R-0Print      benzonatate (TESSALON) 100 MG capsule Take 1 capsule by mouth 2 times daily as needed for Cough, Disp-20 capsule, R-0Print                (Please note that portions of this note were completed with a voice recognitionprogram.  Efforts were made to edit the dictations but occasionally words are mis-transcribed.)    Petr Gonzalez MD (electronically signed)  Attending Emergency Physician          Petr Gonzalez MD  01/12/21 [address] Harpreet  01/12/21 1313

## 2021-01-13 NOTE — ED NOTES
Spoke with patient's daughter Althea Duckworth [phone] via telephone. Daughter states she does not want any aggressive measures done, does not want patient to have CPR or intubation, states she just wants patient kept comfortable.  Dr Ruth Ann Jeffrey spoke with patients daughter via phone as well and confirmed code status     Luciana Trammell RN  01/12/21 1958

## 2021-01-13 NOTE — ED NOTES
[facility] paperwork filled out and signed by Dr Leobardo Johns and placed on chart     [facility]  01/12/21 2006

## 2021-01-13 NOTE — PROGRESS NOTES
Report called to [facility] Updated nurse on Change in code status and Patient's daughter's wishes to keep her mother comfortable. Updated nurse on 4 scripts being sent and d/c instructions. CLAD here to transport.

## 2021-01-13 NOTE — PROGRESS NOTES
Spoke with daughter Nitin Chin to update on condition and transport back to [facility]. Explained to Daughter about Scripts and SAO2 .

## 2021-01-13 NOTE — ED TRIAGE NOTES
Pt from [facility] is covid positive, WBC 19.4 per NH given Cipro subq and normal saline at NH, vitals per NH 98/60, HR 56, temp 100.4 tylenol given, 73% on 5LNC increased to 85%, cxray showed pneumonia, blood glucose 187, weak, hasnt been eating well. Per NH Roseline Sha is patients POA. Pt arrives from nursing home via squad pt states her daughter is Damaso Penn, she does not live here. Pt respers rapid pt on 15L mask, skin pale, warm, dry.

## 2021-01-15 LAB
ALBUMIN SERPL-MCNC: 2.5 G/DL
ALP BLD-CCNC: 167 U/L
ALT SERPL-CCNC: 42 U/L
ANION GAP SERPL CALCULATED.3IONS-SCNC: NORMAL MMOL/L
AST SERPL-CCNC: 63 U/L
BASOPHILS ABSOLUTE: 0.1 /ΜL
BASOPHILS RELATIVE PERCENT: 0.3 %
BILIRUB SERPL-MCNC: 0.4 MG/DL (ref 0.1–1.4)
BLOOD CULTURE, ROUTINE: ABNORMAL
BUN BLDV-MCNC: 66 MG/DL
CALCIUM SERPL-MCNC: 8.1 MG/DL
CHLORIDE BLD-SCNC: 105 MMOL/L
CO2: 14 MMOL/L
CREAT SERPL-MCNC: 2.4 MG/DL
EOSINOPHILS ABSOLUTE: ABNORMAL
EOSINOPHILS RELATIVE PERCENT: 0 %
GFR CALCULATED: NORMAL
GLUCOSE BLD-MCNC: 232 MG/DL
HCT VFR BLD CALC: 27.3 % (ref 36–46)
HEMOGLOBIN: 8.8 G/DL (ref 12–16)
LYMPHOCYTES ABSOLUTE: 0.6 /ΜL
LYMPHOCYTES RELATIVE PERCENT: 3.3 %
MCH RBC QN AUTO: 29.5 PG
MCHC RBC AUTO-ENTMCNC: 32.1 G/DL
MCV RBC AUTO: 92 FL
MONOCYTES ABSOLUTE: 1.1 /ΜL
MONOCYTES RELATIVE PERCENT: 5.6 %
NEUTROPHILS ABSOLUTE: 17.3 /ΜL
NEUTROPHILS RELATIVE PERCENT: 90.8 %
ORGANISM: ABNORMAL
ORGANISM: ABNORMAL
PLATELET # BLD: 305 K/ΜL
PMV BLD AUTO: 9.7 FL
POTASSIUM SERPL-SCNC: 4.8 MMOL/L
RBC # BLD: 2.97 10^6/ΜL
SODIUM BLD-SCNC: 132 MMOL/L
TOTAL PROTEIN: 5.1
WBC # BLD: 19 10^3/ML

## 2021-01-18 ENCOUNTER — OFFICE VISIT (OUTPATIENT)
Dept: GERIATRIC MEDICINE | Age: 86
End: 2021-01-18
Payer: MEDICARE

## 2021-01-18 DIAGNOSIS — R09.02 HYPOXIA: ICD-10-CM

## 2021-01-18 DIAGNOSIS — Z71.89 ENCOUNTER FOR HOSPICE CARE DISCUSSION: ICD-10-CM

## 2021-01-18 DIAGNOSIS — R06.03 RESPIRATORY DISTRESS: ICD-10-CM

## 2021-01-18 DIAGNOSIS — U07.1 COVID-19 VIRUS INFECTION: Primary | ICD-10-CM

## 2021-01-18 DIAGNOSIS — F43.0 ANXIETY AS ACUTE REACTION TO GROSS STRESS: ICD-10-CM

## 2021-01-18 DIAGNOSIS — F41.1 ANXIETY AS ACUTE REACTION TO GROSS STRESS: ICD-10-CM

## 2021-01-18 DIAGNOSIS — R06.03 RESPIRATORY DISTRESS: Primary | ICD-10-CM

## 2021-01-18 LAB
BUN BLDV-MCNC: 69 MG/DL
CALCIUM SERPL-MCNC: 8.3 MG/DL
CHLORIDE BLD-SCNC: 109 MMOL/L
CO2: 14 MMOL/L
CREAT SERPL-MCNC: 2.1 MG/DL
CULTURE, BLOOD 2: NORMAL
GFR CALCULATED: NORMAL
GLUCOSE BLD-MCNC: 203 MG/DL
POTASSIUM SERPL-SCNC: 5.4 MMOL/L
SODIUM BLD-SCNC: 134 MMOL/L

## 2021-01-18 PROCEDURE — 1123F ACP DISCUSS/DSCN MKR DOCD: CPT | Performed by: PHYSICIAN ASSISTANT

## 2021-01-18 PROCEDURE — G8484 FLU IMMUNIZE NO ADMIN: HCPCS | Performed by: PHYSICIAN ASSISTANT

## 2021-01-18 PROCEDURE — 99309 SBSQ NF CARE MODERATE MDM 30: CPT | Performed by: PHYSICIAN ASSISTANT

## 2021-01-18 RX ORDER — MORPHINE SULFATE 100 MG/5ML
5 SOLUTION ORAL
Qty: 21 ML | Refills: 0 | Status: SHIPPED | OUTPATIENT
Start: 2021-01-18 | End: 2021-01-25

## 2021-01-18 RX ORDER — MORPHINE SULFATE 100 MG/5ML
5 SOLUTION ORAL
COMMUNITY
End: 2021-01-18 | Stop reason: SDUPTHER

## 2021-01-18 RX ORDER — LORAZEPAM 2 MG/ML
0.5 CONCENTRATE ORAL
COMMUNITY
End: 2021-01-18 | Stop reason: SDUPTHER

## 2021-01-18 RX ORDER — LORAZEPAM 2 MG/ML
0.5 CONCENTRATE ORAL
Qty: 21 ML | Refills: 0 | Status: SHIPPED | OUTPATIENT
Start: 2021-01-18 | End: 2021-01-25

## 2021-01-18 NOTE — PROGRESS NOTES
Subjective:      Patient ID: Ty Goodwin is a pleasant 80 y.o. female who presents today for:  No chief complaint on file. Patient being seen on COVID unit for progressive decline. Did have subsequent pneumonia treated with Levaquin, and then [address] and treated with azithromycin. Patient is currently not very responsive to oral stimuli. Patient is lying with a nonrebreather in place. She is currently satting at around 82 to 88% on 2 to 6 L. Patient did receive monoclonal antibody treatment with limited success. Patient is currently getting morphine every 2 hours 5 mg p.o. and Ativan as well for respiratory symptom relief and anxiety. It has been fair and overall effectiveness. Patient unable to supply any review of systems today or have any conversation. She is most definitely showing poor overall outlook. We will continue her as needed morphine and Ativan indefinitely as family wants to keep patient as comfortable as possible. Patient does have some open areas on buttocks that we will utilize calmoseptine 3 times daily until resolved as well as as needed usage. Patient appears comfortable and in no acute distress. We will continue monitoring patient via nursing staff as needed. Staff to request family make decision on hospice care as patient continues. For the time being will utilize comfort care measures. Patient's oral intake has been poor she has been getting IV fluids. We will utilize IV fluids as long as patient is not having poor response. We will do labs to monitor if family on board.       Patient Active Problem List   Diagnosis    Diabetes mellitus type 2, controlled, without complications ([address]    Chronic kidney disease (CKD)    Essential hypertension    Hyperlipidemia    PAD (peripheral artery disease) ([address]    Hypothyroidism    CAD (coronary artery disease)    History of DVT (deep vein thrombosis)    History of breast cancer    Vitamin D deficiency    Impaired mobility Intimate partner violence     Fear of current or ex partner: Not on file     Emotionally abused: Not on file     Physically abused: Not on file     Forced sexual activity: Not on file   Other Topics Concern    Not on file   Social History Narrative    Not on file     No family history on file. Allergies   Allergen Reactions    Pcn [Penicillins] Rash     50 plus years ago         Review of Systems   Unable to perform ROS: Acuity of condition       Objective: There were no vitals taken for this visit. Physical Exam  Constitutional:       General: She is not in acute distress. Appearance: She is ill-appearing. HENT:      Head: Normocephalic and atraumatic. Nose: No congestion or rhinorrhea. Mouth/Throat:      Mouth: Mucous membranes are moist.      Pharynx: Oropharynx is clear. Eyes:      Comments: Unable to discerne d/t poor responsiveness     Cardiovascular:      Rate and Rhythm: Normal rate. Frequent extrasystoles are present. Pulses: Decreased pulses. Dorsalis pedis pulses are 0 on the right side and 0 on the left side. Posterior tibial pulses are 0 on the right side and 0 on the left side. Pulmonary:      Effort: Tachypnea and accessory muscle usage present. Breath sounds: Stridor present. Examination of the right-upper field reveals wheezing and rhonchi. Examination of the left-upper field reveals wheezing and rhonchi. Examination of the right-middle field reveals wheezing and rhonchi. Examination of the left-middle field reveals wheezing and rhonchi. Wheezing and rhonchi present. Musculoskeletal:      Right lower le+ Pitting Edema present. Left lower le+ Pitting Edema present. Assessment:       Diagnosis Orders   1. COVID-19 virus infection     2. Respiratory distress     3. Hypoxia     4. Encounter for hospice care discussion           Plan:      No orders of the defined types were placed in this encounter.     No orders of the defined types were placed in this encounter. 1.  Patient had medical antibody; limited response. Subsequent pneumonia treated with antibiotics with no change. Continue monitoring and provide comfort care per family request.  2.  See #1  3. Continue rebreather and oxygen at current rate. 4.  Patient currently wants comfort care. Will revisit the idea of hospice at a later date. Will request further decision over next few days. We will continue providing Ativan and morphine at current freq/stregnth. No follow-ups on file. Side effects, adverse effects of the medication prescribed today, as well as treatment plan and result expectations have been discussed withthe patient who expresses understanding and desires to proceed.     Justine Young, [id number]

## 2021-01-18 NOTE — PROGRESS NOTES
PHYSICAL EXAMINATION:  General:  Fair hygiene overall, bright affect, no acute distress. HEENT:  Pupils are equal, round, reactive to light equally. No injection, icterus noted. Cardiopulmonary:  RRR. No significant lower leg edema. No JVD noted. Lung sounds overall clear to auscultation bilaterally. Abdominal:  Normal bowel sounds. MSK/Neuro: The patient has intact sensation to right hand. Fair  strength approximately close to 4/5 with full effort. Fair dexterity, can feel temperature, touch, pressure sensation. However, she does complain of some tingling in her right hand at the time of this visit. Her left hand fairly unaffected she states. Mental Status: The patient is awake, alert, oriented 3/3. ASSESSMENT AND PLAN:  1. Right carpal tunnel - Continue emphasizing lidocaine patches and her gabapentin medication. Monitor renal function for gabapentin. 2.   Hypertension - - No new changes to cardiac meds. 3.   DM type 2 No new changes to meds. We will continue monitoring blood glucose. We will do A1c in the next 3 to 6 months to monitor long term compliance with diet and status.          Electronically Signed By: Analia Mujica PA-C on 01/14/2021 10:06:03  ______________________________  Analia Mujica PA-C [id number]  D: 01/13/2021 11:22:57  T: 01/14/2021 07:25:42    cc: - [employer]

## 2021-01-19 NOTE — PROGRESS NOTES
Melida Delaney : 12/3/1926 DOS: 2020     [facility]    HPI: Vital signs reviewed, see [address]. The patient is being seen today for acute ongoing complaint of right wrist carpal tunnel pain. The patient had recent surgery a few weeks prior. She states there is some stabilization of pain; however, not enough for her to concede to ongoing treatment with orthopedic physician. She states that she wants to cancel appointment because she feels that she is not getting results she would like to have and does not see the point in pursuing it. Commensurately, the patient expressed that is her age and due to her long-term issue, it is likely very difficult to establish a perfect solution for her. I expressed interest in trying lidocaine and/or numbing gel/patches on her right wrist to see if that helps mitigate some of the discomfort that she is experiencing. I suggested her I would research some treatment strategies in this regard and see if it is affective along with her carpal tunnel surgery. She is otherwise stating she is doing well. She is not drug seeking at home. She did not have any other oral pain medications added. This is good considering the patient is likely not a candidate for increased oral meds due to her age and history of constipation and respiratory depression. We will follow up with the patient and write orders for possible lidocaine patch to right wrist/forearm to see if this mitigates any of her symptoms. MEDICATIONS:  Reviewed. ALLERGIES:  Reviewed. REVIEW OF SYSTEMS:  Constitutional:  Denies any new fever, chills, NVD, weakness, fatigue beyond baseline. Cardiopulmonary:  Denies any CP, orthopnea, PND, or . No new cough, POI, SOB, or wheezing. GI:  Negative. :  Negative. MSK:  The patient complains of right wrist and three, four, five digit pain/tingling/numbness in her right hand. Does have history of recent carpal tunnel surgery. She has fair range of motion, active and passive with discomfort. Psychiatric:  The patient's mood is overall stable. No acute changes. Remaining 14-point ROS unremarkable. PHYSICAL EXAMINATION:  General:  Good hygiene overall, bright affect, no acute distress. Seated in wheelchair today in the hallway. HEENT:  Pupils are equal, round, reactive to light. No icterus. No injection. Shows moist mucous membranes on limited oropharyngeal exam.  Cardiopulmonary:  Regular rate and rhythm. No JVD noted today. There is significant lower leg edema. Peripheral pulses intact, within normal limits. Pulmonary:  Lung sounds clear, some minor diffuse extra wheezing noted in upper lung fields, clears with coughing. Abdominal:  Normal bowel sounds, soft, nontender abdomen. Skin:  Skin of right wrist is within normal limits. Skin intact. No evidence of redness, erythema, swelling, or any new scars, bumps, or deformities. Mental Status: The patient is awake, alert, oriented 3/3. ASSESSMENT AND PLAN:  Right hand carpal tunnel pain/3r, 4th, & 5th digit numbness - will add lidocaine patch to right wrist/forearm q.2 hours to see if this mitigates any of the patient's symptoms.          Electronically Signed By: Eden Franco PA-C on 2020 16:44:48  ______________________________  LADONNA Simpson/[id number]  D: 2020 14:08:19  T: 2020 04:31:01    cc: - [email or web address]

## 2021-02-13 NOTE — PROGRESS NOTES
Not on file   Lifestyle    Physical activity     Days per week: Not on file     Minutes per session: Not on file    Stress: Not on file   Relationships    Social connections     Talks on phone: Not on file     Gets together: Not on file     Attends Sikh service: Not on file     Active member of club or organization: Not on file     Attends meetings of clubs or organizations: Not on file     Relationship status: Not on file    Intimate partner violence     Fear of current or ex partner: Not on file     Emotionally abused: Not on file     Physically abused: Not on file     Forced sexual activity: Not on file   Other Topics Concern    Not on file   Social History Narrative    Not on file     Current Outpatient Medications on File Prior to Visit   Medication Sig Dispense Refill    ELIQUIS 2.5 MG TABS tablet Take 2.5 mg by mouth 2 times daily      gabapentin (NEURONTIN) 100 MG capsule Take 2 capsules by mouth nightly 180 capsule 3    gabapentin (NEURONTIN) 100 MG capsule Take 1 capsule by mouth daily 90 capsule 3    insulin glargine (LANTUS SOLOSTAR) 100 UNIT/ML injection pen Inject 40 Units into the skin nightly 5 Pen 3    insulin aspart (NOVOLOG FLEXPEN) 100 UNIT/ML injection pen Inject 9 Units into the skin daily (with breakfast) 5 Pen 3    insulin aspart (NOVOLOG FLEXPEN) 100 UNIT/ML injection pen Inject 9 Units into the skin Daily with lunch 5 Pen 3    insulin aspart (NOVOLOG FLEXPEN) 100 UNIT/ML injection pen Inject 7 Units into the skin Daily with supper 5 Pen 3    acetaminophen (TYLENOL) 500 MG tablet Take 500 mg by mouth 2 times daily      amLODIPine (NORVASC) 5 MG tablet Take 5 mg by mouth daily      polyvinyl alcohol (LIQUIFILM TEARS) 1.4 % ophthalmic solution Place 2 drops into both eyes 4 times daily as needed      aspirin 81 MG tablet Take 81 mg by mouth daily      atorvastatin (LIPITOR) 40 MG tablet Take 40 mg by mouth daily      Cranberry (AZO CRANBERRY GUMMIES) 500 MG CHEW Take by sounds: Normal breath sounds. Abdominal:      General: Abdomen is flat. Palpations: Abdomen is soft. There is no mass. Musculoskeletal:         General: No swelling. Skin:     General: Skin is dry. Neurological:      General: No focal deficit present. Debby Pool [facility]  No results found for: FERRITIN  Lab Results   Component Value Date    WBC 19.0 01/15/2021    HGB 8.8 (A) 01/15/2021    HCT 27.3 (A) 01/15/2021    MCV 92.0 01/15/2021     01/15/2021     Lab Results   Component Value Date     01/18/2021    K 5.4 01/18/2021    K 3.7 10/23/2019     01/18/2021    CO2 14 01/18/2021    BUN 69 01/18/2021    CREATININE 2.1 01/18/2021    GLUCOSE 203 01/18/2021    GLUCOSE 58 05/02/2012    CALCIUM 8.3 01/18/2021      Lab Results   Component Value Date    TROPONINI 0.006 10/02/2012     No results found for: CRP   No results found for: DDIMER   No results found for: CKTOTAL    No results found. Debby Pool [facility]      Assessment & Plan:     Continue with current regimen of  Vitamin C 500mg PO BID  Vitamin D 1000 IU PO QD  Zinc 50 mg PO QD  Lovenox 30mg SQ BID    Patient does require Dexamethasone 6mg PO QD  Patient does  require antibiotics for concomitant bacterial pneumonia. Continue to monitor blood work:  Weekly D-Dimer, LDH, CRP, CPK, Procalcitonin, Ferritin, Troponin, CBC, BMP. Patient's case discussed with nursing staff and Code Status reviewed.     Marcelina Runner, MD

## 2021-03-01 NOTE — PROGRESS NOTES
2021    TELEHEALTH EVALUATION -- Audio/Visual (During DPOUI-80 public health emergency)    HPI:    Joey Persaud (:  12/3/1926) has requested an audio/video evaluation for the following concern(s):    Patient being followed for COVID-19 infection. Patient has had generally poor response to therapy. Patient is decompensating significantly. She has been on oxygen rebreather mask for the duration of time in Covid unit. She is on full Covid protocol. Did undergo antibody therapy however has not made significant gains regardless. Patient will likely continue on hospice/comfort care due to her poor prognosis. We will maintain close monitoring and maintain current COVID lab protocol to follow. Will follow-up next time facility. Patient unable to respond to questions and unable to perform ROS. Patient has not been eating and/or drinking well over the past several days. She is able to take meds with some encouragement and assistance. We will begin comfort care measures soon as hospice care service can provide visit. Review of Systems   Unable to perform ROS: Acuity of condition       Prior to Visit Medications    Medication Sig Taking?  Authorizing Provider   ELIQUIS 2.5 MG TABS tablet Take 2.5 mg by mouth 2 times daily  Historical Provider, MD   gabapentin (NEURONTIN) 100 MG capsule Take 2 capsules by mouth nightly  , APRN - CNP   gabapentin (NEURONTIN) 100 MG capsule Take 1 capsule by mouth daily  Peter VIVI CNP   insulin glargine (LANTUS SOLOSTAR) 100 UNIT/ML injection pen Inject 40 Units into the skin nightly  , VIVI Dietrich CNP   insulin aspart (NOVOLOG FLEXPEN) 100 UNIT/ML injection pen Inject 9 Units into the skin daily (with breakfast)  Peter , VIVI Dietrich CNP   insulin aspart (NOVOLOG FLEXPEN) 100 UNIT/ML injection pen Inject 9 Units into the skin Daily with lunch  Brittani VIVI CNP  Pcn [Penicillins] Rash     50 plus years ago   ,   Past Medical History:   Diagnosis Date    CAD (coronary artery disease)     Combined hyperlipidemia     DM2 (diabetes mellitus, type 2) [facility]     [facility] thrombosis) [facility]     Hypertension     Hypothyroidism     Phantom limb syndrome ([facility] 9/23/2017    PVD (peripheral vascular disease) ([facility]    ,   Past Surgical History:   Procedure Laterality Date    ABOVE KNEE AMPUTATION      left    APPENDECTOMY      CARPAL TUNNEL RELEASE Right 9/16/2020    RIGHT WRIST CARPAL TUNNEL RELEASE performed by Carlos Baxter DO at [address] [place] [address],[address] Floor WITH STENT PLACEMENT      LEG AMPUTATION BELOW KNEE      right   ,   Social History     Tobacco Use    Smoking status: Unknown If Ever Smoked    Smokeless tobacco: Never Used   Substance Use Topics    Alcohol use: No    Drug use: Not Currently       PHYSICAL EXAMINATION:  [ INSTRUCTIONS:  \"[x]\" Indicates a positive item  \"[]\" Indicates a negative item  -- DELETE ALL ITEMS NOT EXAMINED]  Vital Signs: (As obtained by patient/caregiver or practitioner observation)    101/53 mmHg, 53 bpm, 22 RR, 97.3, 84% on nasal cannula/facemask at 2 to 6 L, BG =  187 mg/DL this morning    Constitutional: [] Appears well-developed and well-nourished [] No apparent distress      [x] Abnormal-increased respiratory distress, poor mentation, tired appearing    mental status  [] Alert and awake  [] Oriented to person/place/time []Able to follow commands      Eyes:  EOM    []  Normal  [] Abnormal-  Sclera  []  Normal  [] Abnormal -         Discharge []  None visible  [] Abnormal -    HENT:   [x] Normocephalic, atraumatic.   [] Abnormal   [x] Mouth/Throat: Mucous membranes are moist.     External Ears [] Normal  [] Abnormal-     Neck: [] No visualized mass     Pulmonary/Chest: [] Respiratory effort normal.  [] No visualized signs of difficulty breathing or respiratory distress [x] Abnormal-moderate to severe difficulty with breathing, including accessory muscle usage.;  Tachypneic  Musculoskeletal:   [] Normal gait with no signs of ataxia         [] Normal range of motion of neck        [] Abnormal-       Neurological:        [x] No Facial Asymmetry (Cranial nerve 7 motor function) (limited exam to video visit)          [] No gaze palsy        [] Abnormal-         Skin:        [] No significant exanthematous lesions or discoloration noted on facial skin         [x] Abnormal-poor skin turgor, increased edema         Psychiatric:       [] Normal Affect [] No Hallucinations        [x] Abnormal-alert and oriented 0/3    Other pertinent observable physical exam findings-     ASSESSMENT/PLAN:  1. COVID-19 virus infection  Continue current protocol. Maintain O2 at current level with possible increase to high output O2 over the next 24 hours. Overall prognosis poor. We will follow-up with in person visit next time facility. 2. Hypoxia  See #1. 3. CKD III  We will continue monitoring labs weekly as currently ordered. No follow-ups on file. Kareem De La Fuenteler, was evaluated through a synchronous (real-time) audio-video encounter. The patient (or guardian if applicable) is aware that this is a billable service. Verbal consent to proceed has been obtained within the past 12 months. The visit was conducted pursuant to the emergency declaration under the [address] [place] [address] authority and the [employer] General Act. Patient identification was verified, and a caregiver was present when appropriate. The patient was located in a state where the provider was credentialed to provide care. Total time spent on this encounter: Not billed by time    --STACEY Juan on 3/1/2021 at 6:50 PM    An electronic signature was used to authenticate this note.

## 2021-03-03 NOTE — PROGRESS NOTES
ALLERGIES:  Reviewed. REVIEW OF SYSTEMS:  Constitutional:  Mild low-grade temp of 99 degree F temporal.  No new vomiting, fatigue. Dyspnea on her baseline. The patient is bound to electric wheelchair. HEENT:  Denies headache, confusion, lightheadedness. No new stuffiness, sneezing, sore throat or hoarseness. Cardiopulmonary:  Denies any CP, PND, RODRIGUEZ or orthopnea beyond her baseline. No new cough, SOB, POI, wheezing today. SPO2 within normal limits. GI:  No new diarrhea, constipation, dysphagia, abdominal pain. :  No new complaints. Psychiatric:  Patient has mild dysthymia secondary to the patient's roommate passing away. Does complain of some mild acute anxiety due to this. Remaining 14-point ROS unremarkable. PHYSICAL EXAMINATION:  General:  Good hygiene. Bright affect. Overall pleasant, cooperative today. No acute distress. HEENT:  PERRLA.  MMM. No erythema or exudate. Cardiopulmonary:  RRR. No new murmurs, rubs, or gallops noted. No significant lower leg edema on remaining leg. Lung sounds clear to auscultation bilaterally. Some mild congestion in bilateral anterior upper lungs. Abdomen:  Normal bowel sounds. Soft, nontender abdomen. Mental Status: The patient is awake, alert, oriented 3/3. ASSESSMENT AND PLAN:  1. PVD - continue apixaban. 2.   DM type 2 with polyneuropathy. No new changes to insulin regimen. Continue current orders. 3.   Hypertensive heart with CKD - Last BMP within normal limits. We will recheck BMP tomorrow along with other COVID labs. Maintain current BP medications. 4.   COVID-19 positive-see above for orders. See chart for orders as well. We will maintain close monitoring via virtual visit several times over the next 2 weeks until discharge from [facility].          Electronically Signed By: Destini Borrego PA-C on 01/07/2021 22:33:53  ______________________________  Destini Borrego PA-C  OT/[id number]  D: 01/06/2021 17:57:01 T: 01/07/2021 14:01:59    cc: - [facility]

## 2021-03-17 ASSESSMENT — ENCOUNTER SYMPTOMS
COLOR CHANGE: 0
COUGH: 1
CONSTIPATION: 0
SHORTNESS OF BREATH: 1

## (undated) DEVICE — BANDAGE,GAUZE,BULKEE II,4.5"X4.1YD,STRL: Brand: MEDLINE

## (undated) DEVICE — BRUSH ENDO CLN L90.5IN SHTH DIA1.7MM BRIST DIA5-7MM 2-6MM

## (undated) DEVICE — SONY PRINTER PAPER

## (undated) DEVICE — GLOVE ORANGE PI 7   MSG9070

## (undated) DEVICE — ENDOSCOPIC TRAY TRNSPRT 20.5X16.5X4.1 IN RECYCL SUGAR PULP

## (undated) DEVICE — JELLY,LUBE,STERILE,FLIP TOP,TUBE,2-OZ: Brand: MEDLINE

## (undated) DEVICE — 4-PORT MANIFOLD: Brand: NEPTUNE 2

## (undated) DEVICE — SYSTEM BX CAP BILI RAP EXCHG CAP LOK DEV COMPATIBLE W/ OLY

## (undated) DEVICE — GOWN,AURORA,NONREINFORCED,LARGE: Brand: MEDLINE

## (undated) DEVICE — CONTAINER,SPECIMEN,OR STERILE,4OZ: Brand: MEDLINE

## (undated) DEVICE — SUTURE ETHLN SZ 4-0 L18IN NONABSORBABLE BLK L19MM PS-2 3/8 1667H

## (undated) DEVICE — TOWEL,OR,DSP,ST,BLUE,STD,4/PK,20PK/CS: Brand: MEDLINE

## (undated) DEVICE — DRESSING PETRO W3XL8IN OIL EMUL N ADH GZ KNIT IMPREG CELOS

## (undated) DEVICE — CONMED SCOPE SAVER BITE BLOCK, 20X27 MM: Brand: SCOPE SAVER

## (undated) DEVICE — SYRINGE, LUER SLIP, STERILE, 10ML: Brand: MEDLINE

## (undated) DEVICE — COVER LT HNDL BLU PLAS

## (undated) DEVICE — SYRINGE MED 50ML LUERLOCK TIP

## (undated) DEVICE — ENDO CARRY-ON PROCEDURE KIT: Brand: ENDO CARRY-ON PROCEDURE KIT

## (undated) DEVICE — HAND II: Brand: MEDLINE INDUSTRIES, INC.

## (undated) DEVICE — GLOVE ORANGE PI 7 1/2   MSG9075

## (undated) DEVICE — WRAP COHESIVE W2INXL5YD TAN SELF ADH BNDG HND NON STERILE TEAR CARING

## (undated) DEVICE — Device: Brand: ENDO SMARTCAP

## (undated) DEVICE — GLOVE ORANGE PI 8 1/2   MSG9085

## (undated) DEVICE — COVER,TABLE,44X90,STERILE: Brand: MEDLINE

## (undated) DEVICE — GOWN,AURORA,NONRNF,XL,30/CS: Brand: MEDLINE

## (undated) DEVICE — SPONGE GZ W4XL4IN COT 12 PLY TYP VII WVN C FLD DSGN

## (undated) DEVICE — TUBE SET 96 MM 64 MM H2O PERISTALTIC STD AUX CHANNEL

## (undated) DEVICE — LABEL MED MINI W/ MARKER

## (undated) DEVICE — APPLICATOR MEDICATED 26 CC SOLUTION HI LT ORNG CHLORAPREP

## (undated) DEVICE — TUBING, SUCTION, 1/4" X 10', STRAIGHT: Brand: MEDLINE

## (undated) DEVICE — ADAPTER FLSH PMP FLD MGMT GI IRRIG OFP 2 DISPOSABLE

## (undated) DEVICE — ELECTRODE PT RET AD L9FT HI MOIST COND ADH HYDRGEL CORDED